# Patient Record
Sex: MALE | Race: WHITE | NOT HISPANIC OR LATINO | Employment: FULL TIME | URBAN - METROPOLITAN AREA
[De-identification: names, ages, dates, MRNs, and addresses within clinical notes are randomized per-mention and may not be internally consistent; named-entity substitution may affect disease eponyms.]

---

## 2017-01-03 ENCOUNTER — CARE COORDINATION (OUTPATIENT)
Dept: CARDIOLOGY | Facility: CLINIC | Age: 32
End: 2017-01-03

## 2017-03-19 DIAGNOSIS — J31.0 CHRONIC RHINITIS: ICD-10-CM

## 2017-03-19 NOTE — TELEPHONE ENCOUNTER
ALLEGRA-D 24 HOUR# 180-240 MG      Last Written Prescription Date:  3/30/2010  Last Fill Quantity: 90,   # refills: 0  Last Office Visit with [facility], [facility] or [facility] prescribing provider:10/28/2016  Future Office visit:       Routing refill request to provider for review/approval because:  Drug not active on patient's medication list

## 2017-03-20 RX ORDER — FEXOFENADINE HCL AND PSEUDOEPHEDRINE HCL 180; 240 MG/1; MG/1
1 TABLET, EXTENDED RELEASE ORAL DAILY
Qty: 90 TABLET | Refills: 3 | Status: SHIPPED | OUTPATIENT
Start: 2017-03-20 | End: 2018-04-24

## 2018-03-07 ENCOUNTER — OFFICE VISIT (OUTPATIENT)
Dept: PEDIATRICS | Facility: CLINIC | Age: 33
End: 2018-03-07
Payer: COMMERCIAL

## 2018-03-07 VITALS
WEIGHT: 210.6 LBS | OXYGEN SATURATION: 96 % | TEMPERATURE: 98.3 F | HEART RATE: 119 BPM | SYSTOLIC BLOOD PRESSURE: 132 MMHG | HEIGHT: 66 IN | DIASTOLIC BLOOD PRESSURE: 76 MMHG | BODY MASS INDEX: 33.85 KG/M2

## 2018-03-07 DIAGNOSIS — L72.3 SEBACEOUS CYST: Primary | ICD-10-CM

## 2018-03-07 PROCEDURE — 99213 OFFICE O/P EST LOW 20 MIN: CPT | Performed by: INTERNAL MEDICINE

## 2018-03-07 RX ORDER — CLINDAMYCIN HCL 300 MG
300 CAPSULE ORAL 3 TIMES DAILY
Qty: 21 CAPSULE | Refills: 0 | Status: SHIPPED | OUTPATIENT
Start: 2018-03-07 | End: 2018-03-14

## 2018-03-07 NOTE — MR AVS SNAPSHOT
After Visit Summary   3/7/2018    Judah Candelaria    MRN: [id number]           Patient Information     Date Of Birth          1985        Visit Information        Provider Department      3/7/2018 3:20 PM Rajeev Barnes MD [facility]        Today's Diagnoses     Sebaceous cyst    -  1      Care Instructions    Clindamycin 300 mg    Take 3 times per day for 1 week   Antibiotic to treat infection in the cyst    Call to set up a surgery visit to discuss having the cyst removed           Follow-ups after your visit        Additional Services     GENERAL SURG ADULT REFERRAL       Your provider has referred you to:   [facility] [place] [phone]   [email or web address]    Please be aware that coverage of these services is subject to the terms and limitations of your health insurance plan.  Call member services at your health plan with any benefit or coverage questions.      Please bring the following with you to your appointment:    (1) Any X-Rays, CTs or MRIs which have been performed.  Contact the facility where they were done to arrange for  prior to your scheduled appointment.   (2) List of current medications   (3) This referral request   (4) Any documents/labs given to you for this referral                  Who to contact     If you have questions or need follow up information about today's clinic visit or your schedule please contact [facility] directly at [phone].  Normal or non-critical lab and imaging results will be communicated to you by MyChart, letter or phone within 4 business days after the clinic has received the results. If you do not hear from us within 7 days, please contact the clinic through MyChart or phone. If you have a critical or abnormal lab result, we will notify you by phone as soon as possible.  Submit refill requests through [employer] or call your pharmacy and they will  "forward the refill request to us. Please allow 3 business days for your refill to be completed.          Additional Information About Your Visit        [email or web address] Information     [email or web address] lets you send messages to your doctor, view your test results, renew your prescriptions, schedule appointments and more. To sign up, go to [email or web address] . Click on \"Log in\" on the left side of the screen, which will take you to the Welcome page. Then click on \"Sign up Now\" on the right side of the page.     You will be asked to enter the access code listed below, as well as some personal information. Please follow the directions to create your username and password.     Your access code is: [email or web address]  Expires: 2018  3:50 PM     Your access code will  in 90 days. If you need help or a new code, please call your [place] clinic or [phone].        Care EveryWhere ID     This is your Care EveryWhere ID. This could be used by other organizations to access your [place] medical records  [id number]        Your Vitals Were     Pulse Temperature Height Pulse Oximetry BMI (Body Mass Index)       119 98.3  F (36.8  C) 5' 6\" (1.676 m) 96% 33.99 kg/m2        Blood Pressure from Last 3 Encounters:   18 132/76   16 132/78   10/28/16 120/80    Weight from Last 3 Encounters:   18 210 lb 9.6 oz (95.5 kg)   16 196 lb (88.9 kg)   10/28/16 198 lb (89.8 kg)              We Performed the Following     GENERAL SURG ADULT REFERRAL          Today's Medication Changes          These changes are accurate as of 3/7/18  3:50 PM.  If you have any questions, ask your nurse or doctor.               Start taking these medicines.        Dose/Directions    clindamycin 300 MG capsule   Commonly known as:  CLEOCIN   Used for:  Sebaceous cyst   Started by:  Rajeev Barnes MD        Dose:  300 mg   Take 1 capsule (300 mg) by mouth 3 times daily for 7 days   Quantity:  21 capsule   Refills:  0            Where to get your " medicines      These medications were sent to [facility] - [place], [place] - [address], [facility] [address]     Phone:  [phone]     clindamycin 300 MG capsule                Primary Care Provider Office Phone # Fax #    Rajeev Barnes [phone] [address] [place] [address]        Equal Access to Services     [facility]: Hadii aad ku hadasho Soomaali, waaxda luqadaha, qaybta kaalmada adeegyada, waxay idiin hayaan adeeg kharash la'aan ah. So [facility] [phone].    ATENCIÓN: Si habla espjoanne, tiene a medrano disposición servicios gratuitos de asistencia lingüística. [facility] [phone].    We comply with applicable federal civil rights laws and [place] laws. We do not discriminate on the basis of race, color, national origin, age, disability, sex, sexual orientation, or gender identity.            Thank you!     Thank you for choosing [facility]  for your care. Our goal is always to provide you with excellent care. Hearing back from our patients is one way we can continue to improve our services. Please take a few minutes to complete the written survey that you may receive in the mail after your visit with us. Thank you!             Your Updated Medication List - Protect others around you: Learn how to safely use, store and throw away your medicines at www.disposemymeds.org.          This list is accurate as of 3/7/18  3:50 PM.  Always use your most recent med list.                   Brand Name Dispense Instructions for use Diagnosis    clindamycin 300 MG capsule    CLEOCIN    21 capsule    Take 1 capsule (300 mg) by mouth 3 times daily for 7 days    Sebaceous cyst       fexofenadine-pseudoePHEDrine 180-240 MG per 24 hr tablet    ALLEGRA-D 24    90 tablet    Take 1 tablet by mouth daily    Chronic rhinitis

## 2018-03-07 NOTE — PROGRESS NOTES
"  SUBJECTIVE:   Judah Candelaria is a 32 year old male who presents to clinic today for the following health issues:      Lump on back of Neck      Duration: 2 weeks    Description (location/character/radiation): Left side of neck    Intensity:  No pain    History (similar episodes/previous evaluation): cyst on knee once     Has noted discharge from the area over the past day.     Problem list and histories reviewed & adjusted, as indicated.      OBJECTIVE:     /76 (BP Location: Right arm, Patient Position: Sitting, Cuff Size: Adult Large)  Pulse 119  Temp 98.3  F (36.8  C)  Ht 5' 6\" (1.676 m)  Wt 210 lb 9.6 oz (95.5 kg)  SpO2 96%  BMI 33.99 kg/m2  Body mass index is 33.99 kg/(m^2).  GEN: No distress  SKIN: cyst on the left side of the neck. Large in size - palpates at approx 2 cm diameter. Lori fluid oozing at the center. Pink annular ring on the lateral aspect.    ASSESSMENT/PLAN:       ICD-10-CM    1. Sebaceous cyst L72.3 clindamycin (CLEOCIN) 300 MG capsule     GENERAL SURG ADULT REFERRAL     Patient Instructions   Clindamycin 300 mg    Take 3 times per day for 1 week   Antibiotic to treat infection in the cyst    Call to set up a surgery visit to discuss having the cyst removed     Rajeev Barnes MD  [facility]  "

## 2018-03-07 NOTE — PATIENT INSTRUCTIONS
Clindamycin 300 mg    Take 3 times per day for 1 week   Antibiotic to treat infection in the cyst    Call to set up a surgery visit to discuss having the cyst removed

## 2018-03-15 ENCOUNTER — OFFICE VISIT (OUTPATIENT)
Dept: SURGERY | Facility: CLINIC | Age: 33
End: 2018-03-15
Payer: COMMERCIAL

## 2018-03-15 ENCOUNTER — TELEPHONE (OUTPATIENT)
Dept: SURGERY | Facility: CLINIC | Age: 33
End: 2018-03-15

## 2018-03-15 VITALS
WEIGHT: 210 LBS | HEART RATE: 97 BPM | RESPIRATION RATE: 16 BRPM | BODY MASS INDEX: 33.75 KG/M2 | OXYGEN SATURATION: 98 % | SYSTOLIC BLOOD PRESSURE: 112 MMHG | HEIGHT: 66 IN | DIASTOLIC BLOOD PRESSURE: 78 MMHG

## 2018-03-15 DIAGNOSIS — R22.9 LOCALIZED SUPERFICIAL SWELLING, MASS, OR LUMP: Primary | ICD-10-CM

## 2018-03-15 PROCEDURE — 99212 OFFICE O/P EST SF 10 MIN: CPT | Performed by: SURGERY

## 2018-03-15 NOTE — MR AVS SNAPSHOT
"              After Visit Summary   3/15/2018    Judah Candelaria    MRN: [id number]           Patient Information     Date Of Birth          1985        Visit Information        Provider Department      3/15/2018 11:30 AM Lisa Ashby MD [facility] General [facility]      Today's Diagnoses     Localized superficial swelling, mass, or lump    -  1       Follow-ups after your visit        Your next 10 appointments already scheduled     Mar 19, 2018  2:00 PM CDT   Pre-Op physical with Rajeev Barnes MD   [facility] ([facility]    [address] [place] [address]  [address]  [facility] [address]   [phone]            Mar 20, 2018  2:30 PM CDT   [facility] with Lisa Ashby MD   [facility] ([facility])    [facility])   [phone]              Who to contact     If you have questions or need follow up information about today's clinic visit or your schedule please contact [facility] directly at [phone].  Normal or non-critical lab and imaging results will be communicated to you by PhotoRockethart, letter or phone within 4 business days after the clinic has received the results. If you do not hear from us within 7 days, please contact the clinic through [employer]t or phone. If you have a critical or abnormal lab result, we will notify you by phone as soon as possible.  Submit refill requests through [employer] or call your pharmacy and they will forward the refill request to us. Please allow 3 business days for your refill to be completed.          Additional Information About Your Visit        PhotoRocketharSparCode Information     ExpoPromoter lets you send messages to your doctor, view your test results, renew your prescriptions, schedule appointments and more. To sign up, go to [email or web address] . Click on \"Log in\" on " "the left side of the screen, which will take you to the Welcome page. Then click on \"Sign up Now\" on the right side of the page.     You will be asked to enter the access code listed below, as well as some personal information. Please follow the directions to create your username and password.     Your access code is: [email or web address]  Expires: 2018  4:50 PM     Your access code will  in 90 days. If you need help or a new code, please call your [facility] or [phone].        Care EveryWhere ID     This is your Care EveryWhere ID. This could be used by other organizations to access your [facility] medical records  [id number]        Your Vitals Were     Pulse Respirations Height Pulse Oximetry BMI (Body Mass Index)       97 16 5' 6\" (1.676 m) 98% 33.89 kg/m2        Blood Pressure from Last 3 Encounters:   03/15/18 112/78   18 132/76   16 132/78    Weight from Last 3 Encounters:   03/15/18 210 lb (95.3 kg)   18 210 lb 9.6 oz (95.5 kg)   16 196 lb (88.9 kg)              Today, you had the following     No orders found for display       Primary Care Provider Office Phone # Fax #    Rajeev Barnes [phone] [address] [place] [address]        Equal Access to Services     [facility]: Hadii zach ku hadasho Sozairaali, waaxda luqadaha, qaybta kaalmada flor, federico aragon. So [facility] [place] [phone].    ATENCIÓN: Si habla español, tiene a medrano disposición servicios gratuitos de asistencia lingüística. Llame al [phone].    We comply with applicable federal civil rights laws and [place] laws. We do not discriminate on the basis of race, color, national origin, age, disability, sex, sexual orientation, or gender identity.            Thank you!     Thank you for choosing [facility]  for your care. Our goal is always to provide you with excellent care. Hearing back from our patients is one way we can " continue to improve our services. Please take a few minutes to complete the written survey that you may receive in the mail after your visit with us. Thank you!             Your Updated Medication List - Protect others around you: Learn how to safely use, store and throw away your medicines at www.disposemymeds.org.          This list is accurate as of 3/15/18 11:59 PM.  Always use your most recent med list.                   Brand Name Dispense Instructions for use Diagnosis    fexofenadine-pseudoePHEDrine 180-240 MG per 24 hr tablet    ALLEGRA-D 24    90 tablet    Take 1 tablet by mouth daily    Chronic rhinitis

## 2018-03-15 NOTE — LETTER
"    March 15, 2018    RE: Judah Candelaria, \"  1985        Judah Candelaria is seen in consultation for left neck cyst, at the request of Rajeev Barnes MD.     2 cm left, posterior neck dermal cyst.      Plan:  We have discussed the options of observation and excision.  He elects excisional biopsy and excision.  We discussed the indications, alternatives, and risks.  We discussed scarring, numbness, anesthesia, infection, bleeding, and recurrence.     We will schedule surgery with MAC at the patient's convenience.        HPI:  Judah presents with his mother today for a left neck dermal lesion.  Finished 1 week clindamycin.     Duration:  4 weeks   H/o trauma:  No  Drainage:  Yes: pus, improving  Previous infections:  No  Other such masses now or in the past:  Yes:  thigh cyst excised with MAC  Family h/o similar masses:  No  Pain:  No  Size:  decreasing     Past Medical History:   has no past medical history on file.     Family history reviewed and not pertinent.     ROS:  The 10 point review of systems is negative other than noted in the HPI and above.         PE:  /78 (BP Location: Right arm, Cuff Size: Adult Large)  Pulse 97  Resp 16  Ht 5' 6\" (1.676 m)  Wt 210 lb (95.3 kg)  SpO2 98%  BMI 33.89 kg/m2  General appearance: well-developed, well-nourished, no apparent distress  HEENT:  Head normocephalic and atraumatic, pupils equal and round, conjunctivae clear, mucous membranes moist, external ears and nose normal  Lungs: respirations unlabored  Musculoskeletal:  Normal station and gait  Neurologic: alert, speech is clear, moves all extremities with good strength  Psychiatric: affect flat  Skin: There is a 2 cm dermal cyst on/in the left, posterior neck.  There are 2 overlying punctae.  the overlying skin is mildly erythematous.  It is mildly tender.             Lisa Ashby MD    "

## 2018-03-15 NOTE — TELEPHONE ENCOUNTER
Type of surgery: EXCISION LEFT BACK CYST   Location of surgery: Other: [facility]  Date and time of surgery: 3-20-18 AT 2:30 PM   Surgeon: DR. CITLALLI FLOYD   Pre-Op Appt Date: PATIENT TO SCHEDULE   Post-Op Appt Date: PATIENT TO SCHEDULE    Packet sent out: GIVEN TO PATIENT   Pre-cert/Authorization completed:  Yes  Date: 3-15-18        *RSC*  EXCISION LEFT BACK CYST   MAC  PT INST  TO HAVE H&P WITH DR. CHAIDEZ   40 MINS REQ  NON  A

## 2018-03-15 NOTE — PROGRESS NOTES
Assessment:   Judah Candelaria is seen in consultation for left neck cyst, at the request of Rajeev Barnes MD.    2 cm left, posterior neck dermal cyst.     Plan:  We have discussed the options of observation and excision.  He elects excisional biopsy and excision.  We discussed the indications, alternatives, and risks.  We discussed scarring, numbness, anesthesia, infection, bleeding, and recurrence.    We will schedule surgery with MAC at the patient's convenience.      HPI:  Judah presents with his mother today for a left neck dermal lesion.  Finished 1 week clindamycin.    Duration:  4 weeks   H/o trauma:  No  Drainage:  Yes: pus, improving  Previous infections:  No  Other such masses now or in the past:  Yes: 2016 thigh cyst excised with MAC  Family h/o similar masses:  No  Pain:  No  Size:  decreasing    Past Medical History:   has no past medical history on file.    Past Surgical History:  Past Surgical History:   Procedure Laterality Date     C REPR PDA BY LIGATN  11/1988     HC VALVULOPLASTY, AORTIC  10/13/97        Social History:  Social History     Social History     Marital status: Single     Spouse name: N/A     Number of children: N/A     Years of education: N/A     Occupational History     Not on file.     Social History Main Topics     Smoking status: Never Smoker     Smokeless tobacco: Never Used     Alcohol use No      Comment: rare     Drug use: No     Sexual activity: No     Other Topics Concern     Not on file     Social History Narrative        Family History:  Family History   Problem Relation Age of Onset     Hypertension Maternal Grandmother      Hypertension Maternal Grandfather      Heart Disease     CANCER Maternal Grandfather      skin/Prostate     Coronary Artery Disease Maternal Grandfather      Prostate Cancer Maternal Grandfather      Coronary Artery Disease Paternal Grandfather      Family History Negative Mother      Family History Negative Father      Family history  "reviewed and not pertinent.    ROS:  The 10 point review of systems is negative other than noted in the HPI and above.    PE:  /78 (BP Location: Right arm, Cuff Size: Adult Large)  Pulse 97  Resp 16  Ht 5' 6\" (1.676 m)  Wt 210 lb (95.3 kg)  SpO2 98%  BMI 33.89 kg/m2  General appearance: well-developed, well-nourished, no apparent distress  HEENT:  Head normocephalic and atraumatic, pupils equal and round, conjunctivae clear, mucous membranes moist, external ears and nose normal  Lungs: respirations unlabored  Musculoskeletal:  Normal station and gait  Neurologic: alert, speech is clear, moves all extremities with good strength  Psychiatric: affect flat  Skin: There is a 2 cm dermal cyst on/in the left, posterior neck.  There are 2 overlying punctae.  the overlying skin is mildly erythematous.  It is mildly tender.        This note was created using voice recognition software. Undetected word substitutions or other errors may have occurred.     Time spent with the patient with greater that 50% of the time in discussion was 10 minutes.     Lisa Ashby MD    Please route or send letter to:  Primary Care Provider (PCP) and Referring Provider      "

## 2018-03-16 ENCOUNTER — TELEPHONE (OUTPATIENT)
Dept: PEDIATRICS | Facility: CLINIC | Age: 33
End: 2018-03-16

## 2018-03-16 DIAGNOSIS — Q25.1 CONGENITAL ATRESIA AND STENOSIS OF AORTA: Primary | ICD-10-CM

## 2018-03-16 DIAGNOSIS — Q25.29 CONGENITAL ATRESIA AND STENOSIS OF AORTA: Primary | ICD-10-CM

## 2018-03-16 DIAGNOSIS — Q23.81 BICUSPID AORTIC VALVE: ICD-10-CM

## 2018-03-16 NOTE — TELEPHONE ENCOUNTER
Patients mother calling. Patient went to do a physical to get hired for a new job and they heard his heart murmer and want him to obtain a clearance from a cardiologist. 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 States needs asap. Patient would like a call back yet today.  [phone]

## 2018-03-16 NOTE — TELEPHONE ENCOUNTER
I called Judah.  Chart reviewed.  I do not see a recent echocardiogram.   Was referred to cardiology in 2016, but no notes are found in the chart.    LM that I can refer him to cardiology if he would like.   Order pended. OK to sign if he wants.   He could see a cardiologist here at the [facility] or any other [facility] location.

## 2018-03-19 ENCOUNTER — OFFICE VISIT (OUTPATIENT)
Dept: PEDIATRICS | Facility: CLINIC | Age: 33
End: 2018-03-19
Payer: COMMERCIAL

## 2018-03-19 ENCOUNTER — TELEPHONE (OUTPATIENT)
Dept: PEDIATRICS | Facility: CLINIC | Age: 33
End: 2018-03-19

## 2018-03-19 VITALS
RESPIRATION RATE: 20 BRPM | HEIGHT: 67 IN | TEMPERATURE: 98.3 F | BODY MASS INDEX: 32.69 KG/M2 | HEART RATE: 84 BPM | SYSTOLIC BLOOD PRESSURE: 110 MMHG | WEIGHT: 208.3 LBS | DIASTOLIC BLOOD PRESSURE: 78 MMHG

## 2018-03-19 DIAGNOSIS — Q23.81 BICUSPID AORTIC VALVE: ICD-10-CM

## 2018-03-19 DIAGNOSIS — L72.3 SEBACEOUS CYST: ICD-10-CM

## 2018-03-19 DIAGNOSIS — Z01.818 PREOP GENERAL PHYSICAL EXAM: Primary | ICD-10-CM

## 2018-03-19 DIAGNOSIS — Q25.29 CONGENITAL ATRESIA AND STENOSIS OF AORTA: ICD-10-CM

## 2018-03-19 DIAGNOSIS — Q25.1 CONGENITAL ATRESIA AND STENOSIS OF AORTA: ICD-10-CM

## 2018-03-19 PROCEDURE — 93000 ELECTROCARDIOGRAM COMPLETE: CPT | Performed by: INTERNAL MEDICINE

## 2018-03-19 PROCEDURE — 99214 OFFICE O/P EST MOD 30 MIN: CPT | Performed by: INTERNAL MEDICINE

## 2018-03-19 NOTE — TELEPHONE ENCOUNTER
Spoke with mom this morning. As Cardiology next available appt. Was in April.     Pt. Has OV scheduled for today. Advised to discuss external cardiology referral and possible echo at today's visit. Mom expressed understanding.     Faviola HOWARD RN, BSN, PHN  [facility] RN

## 2018-03-19 NOTE — PATIENT INSTRUCTIONS
Call your surgeon if there is any change in your health. This includes signs of a cold or flu (such as a sore throat, runny nose, cough, rash or fever).    Do not smoke, drink alcohol or take over the counter medicine (unless your surgeon or primary care doctor tells you to) for the 24 hours before and after surgery.    Eating and drinking prior to surgery: follow the instructions from your surgeon

## 2018-03-19 NOTE — MR AVS SNAPSHOT
After Visit Summary   3/19/2018    Judah Candelaria    MRN: [id number]           Patient Information     Date Of Birth          1985        Visit Information        Provider Department      3/19/2018 2:00 PM Rajeev Barnes MD [place] [facility]        Today's Diagnoses     Preop general physical exam    -  1    Sebaceous cyst        Bicuspid aortic valve        Congenital atresia and stenosis of aorta          Care Instructions        Call your surgeon if there is any change in your health. This includes signs of a cold or flu (such as a sore throat, runny nose, cough, rash or fever).    Do not smoke, drink alcohol or take over the counter medicine (unless your surgeon or primary care doctor tells you to) for the 24 hours before and after surgery.    Eating and drinking prior to surgery: follow the instructions from your surgeon            Follow-ups after your visit        Your next 10 appointments already scheduled     Mar 20, 2018  2:30 PM T   [facility]   [facility] ([facility])    [facility] ([facility])   [phone]              Future tests that were ordered for you today     Open Future Orders        Priority Expected Expires Ordered    Echo congenital adult Routine  3/19/2019 3/19/2018            Who to contact     If you have questions or need follow up information about today's clinic visit or your schedule please contact [facility] [place] directly at [phone].  Normal or non-critical lab and imaging results will be communicated to you by MyChart, letter or phone within 4 business days after the clinic has received the results. If you do not hear from us within 7 days, please contact the clinic through MyChart or phone. If you have a critical or abnormal lab result, we will notify you by phone as soon as possible.  Submit refill requests through  "MyChart or call your pharmacy and they will forward the refill request to us. Please allow 3 business days for your refill to be completed.          Additional Information About Your Visit        MyChart Information     [email or web address] lets you send messages to your doctor, view your test results, renew your prescriptions, schedule appointments and more. To sign up, go to [email or web address] . Click on \"Log in\" on the left side of the screen, which will take you to the Welcome page. Then click on \"Sign up Now\" on the right side of the page.     You will be asked to enter the access code listed below, as well as some personal information. Please follow the directions to create your username and password.     Your access code is: [email or web address]  Expires: 2018  4:50 PM     Your access code will  in 90 days. If you need help or a new code, please call your [facility] clinic or [phone].        Care EveryWhere ID     This is your Care EveryWhere ID. This could be used by other organizations to access your [facility] medical records  [id number]        Your Vitals Were     Pulse Temperature Respirations Height BMI (Body Mass Index)       84 98.3  F (36.8  C) (Oral) 20 5' 6.5\" (1.689 m) 33.12 kg/m2        Blood Pressure from Last 3 Encounters:   18 110/78   03/15/18 112/78   18 132/76    Weight from Last 3 Encounters:   18 208 lb 4.8 oz (94.5 kg)   03/15/18 210 lb (95.3 kg)   18 210 lb 9.6 oz (95.5 kg)              We Performed the Following     EKG 12-lead complete w/read - Clinics        Primary Care Provider Office Phone # Fax #    Rajeev Barnes [phone] [address] [place] [id number]        Equal Access to Services     [facility] : [place], waneo luqcontreras, federico au . So [facility] [phone].    ATENCIÓN: Si habla español, tiene a medrano disposición servicios gratuitos de " asistencia lingüística. Bernie al [phone].    We comply with applicable federal civil rights laws and [place] laws. We do not discriminate on the basis of race, color, national origin, age, disability, sex, sexual orientation, or gender identity.            Thank you!     Thank you for choosing [facility] [place]  for your care. Our goal is always to provide you with excellent care. Hearing back from our patients is one way we can continue to improve our services. Please take a few minutes to complete the written survey that you may receive in the mail after your visit with us. Thank you!             Your Updated Medication List - Protect others around you: Learn how to safely use, store and throw away your medicines at www.disposemymeds.org.          This list is accurate as of 3/19/18  2:44 PM.  Always use your most recent med list.                   Brand Name Dispense Instructions for use Diagnosis    fexofenadine-pseudoePHEDrine 180-240 MG per 24 hr tablet    ALLEGRA-D 24    90 tablet    Take 1 tablet by mouth daily    Chronic rhinitis

## 2018-03-19 NOTE — PROGRESS NOTES
[facility] [place]  [address] [place] [address]  [address]  [place] [id number] [phone]    PRE-OP EVALUATION:  Today's date: 3/19/2018    Fax number for surgical facility: [phone]  Primary Physician: Rajeev Barnes  Type of Anesthesia Anticipated: General    Patient has a Health Care Directive or Living Will:  NO    Preop Questions 3/19/2018   Who is doing your surgery? Dr. Ashby   What are you having done? cyst removed   Date of Surgery/Procedure: 03\20\18   Facility or Hospital where procedure/surgery will be performed: [facility]   1.  Do you have a history of Heart attack, stroke, stent, coronary bypass surgery, or other heart surgery? YES  - congenital heart disease   2.  Do you ever have any pain or discomfort in your chest? No   3.  Do you have a history of  Heart Failure? No   4.   Are you troubled by shortness of breath when:  walking on a level surface, or up a slight hill, or at night? No   5.  Do you currently have a cold, bronchitis or other respiratory infection? No   6.  Do you have a cough, shortness of breath, or wheezing? No   7.  Do you sometimes get pains in the calves of your legs when you walk? No   8. Do you or anyone in your family have previous history of blood clots? No   9.  Do you or does anyone in your family have a serious bleeding problem such as prolonged bleeding following surgeries or cuts? No   10. Have you ever had problems with anemia or been told to take iron pills? No   11. Have you had any abnormal blood loss such as black, tarry or bloody stools? No   12. Have you ever had a blood transfusion? No   13. Have you or any of your relatives ever had problems with anesthesia? No   14. Do you have sleep apnea, excessive snoring or daytime drowsiness? No   15. Do you have any prosthetic heart valves? No   16. Do you have prosthetic joints? No     HPI:     HPI related to upcoming procedure: Recurrent cyst on the upper back. Plan for surgical removal.  "    Bicuspid aortic valve and hx of congenital atresia of the aorta. Last echocardiogram was in 2006. No cardiac sx such as CP, palpitations, PND, orthopnea, FIELDS or peripheral edema.     MEDICAL HISTORY:     Patient Active Problem List    Diagnosis Date Noted     Sebaceous cyst 04/19/2013     Bicuspid aortic valve 04/19/2013     Congenital atresia and stenosis of aorta 09/07/2007     * * * SBE PROPHYLAXIS * * * 09/07/2007     Chronic maxillary sinusitis 10/18/2002     Chronic rhinitis 10/18/2002      No past medical history on file.  Past Surgical History:   Procedure Laterality Date     C REPR PDA BY LIGATN  11/1988     HC VALVULOPLASTY, AORTIC  10/13/97     Current Outpatient Prescriptions   Medication Sig Dispense Refill     fexofenadine-pseudoePHEDrine (ALLEGRA-D 24) 180-240 MG per 24 hr tablet Take 1 tablet by mouth daily 90 tablet 3     OTC products: no recent use of OTC ASA, NSAIDS or Steroids    Allergies   Allergen Reactions     Augmentin Nausea     Clindamycin Nausea and Vomiting      Latex Allergy: NO    Social History   Substance Use Topics     Smoking status: Never Smoker     Smokeless tobacco: Never Used     Alcohol use No      Comment: rare     History   Drug Use No       REVIEW OF SYSTEMS:   Constitutional, HEENT, cardiovascular, pulmonary, gi and gu systems are negative, except as otherwise noted.    EXAM:   /78  Pulse 84  Temp 98.3  F (36.8  C) (Oral)  Resp 20  Ht 5' 6.5\" (1.689 m)  Wt 208 lb 4.8 oz (94.5 kg)  BMI 33.12 kg/m2    GENERAL APPEARANCE: healthy, alert and no distress     EYES: EOMI,  PERRL     HENT: ear canals and TM's normal and nose and mouth without ulcers or lesions     NECK: no adenopathy, no asymmetry, masses, or scars and thyroid normal to palpation     RESP: lungs clear to auscultation - no rales, rhonchi or wheezes     CV: regular rates and rhythm, normal S1 S2,. 2-3/6 systolic murmur RUSB and LLSB. No click or rub     ABDOMEN:  soft, nontender, no HSM or masses and " bowel sounds normal     MS: extremities normal- no gross deformities noted, no evidence of inflammation in joints, FROM in all extremities.     SKIN: no suspicious lesions or rashes     NEURO: Normal strength and tone, sensory exam grossly normal, mentation intact and speech normal     PSYCH: mentation appears normal. and affect normal/bright     LYMPHATICS: No cervical adenopathy    DIAGNOSTICS:   EKG: Normal Sinus Rhythm, normal axis, normal intervals, no acute ST/T changes c/w ischemia, no LVH by voltage criteria, unchanged from previous tracings    Recent Labs   Lab Test  11/10/10   0922   NA  141   POTASSIUM  4.2   CR  0.94        IMPRESSION:   Reason for surgery/procedure: cyst    The proposed surgical procedure is considered LOW risk.    REVISED CARDIAC RISK INDEX  The patient has the following serious cardiovascular risks for perioperative complications such as (MI, PE, VFib and 3  AV Block):      ICD-10-CM    1. Preop general physical exam Z01.818 EKG 12-lead complete w/read - Clinics   2. Sebaceous cyst L72.3 EKG 12-lead complete w/read - Clinics   3. Bicuspid aortic valve Q23.1 Echo congenital adult     EKG 12-lead complete w/read - Clinics   4. Congenital atresia and stenosis of aorta Q25.29 Echo congenital adult    Q25.1 EKG 12-lead complete w/read - Clinics   Functional capacity by history is normal. Low risk surgery.   Echocardiogram ordered but will not be able to be completed prior to surgery.     RECOMMENDATIONS:     APPROVAL GIVEN to proceed with proposed procedure, without further diagnostic evaluation       Signed Electronically by: Rajeev Barnes MD

## 2018-03-20 ENCOUNTER — APPOINTMENT (OUTPATIENT)
Dept: SURGERY | Facility: PHYSICIAN GROUP | Age: 33
End: 2018-03-20
Payer: COMMERCIAL

## 2018-03-20 ENCOUNTER — TRANSFERRED RECORDS (OUTPATIENT)
Dept: SURGERY | Facility: CLINIC | Age: 33
End: 2018-03-20

## 2018-03-20 ENCOUNTER — HOSPITAL PATHOLOGY (OUTPATIENT)
Dept: OTHER | Facility: CLINIC | Age: 33
End: 2018-03-20

## 2018-03-20 PROCEDURE — 11402 EXC TR-EXT B9+MARG 1.1-2 CM: CPT | Performed by: SURGERY

## 2018-03-21 ENCOUNTER — HOSPITAL ENCOUNTER (OUTPATIENT)
Dept: CARDIOLOGY | Facility: CLINIC | Age: 33
Discharge: HOME | End: 2018-03-21
Attending: INTERNAL MEDICINE | Admitting: INTERNAL MEDICINE
Payer: COMMERCIAL

## 2018-03-21 DIAGNOSIS — Q23.81 BICUSPID AORTIC VALVE: ICD-10-CM

## 2018-03-21 DIAGNOSIS — Q25.29 CONGENITAL ATRESIA AND STENOSIS OF AORTA: ICD-10-CM

## 2018-03-21 DIAGNOSIS — Q25.1 CONGENITAL ATRESIA AND STENOSIS OF AORTA: ICD-10-CM

## 2018-03-21 PROCEDURE — 25500064 ZZH RX 255 OP 636: Performed by: INTERNAL MEDICINE

## 2018-03-21 PROCEDURE — 93306 TTE W/DOPPLER COMPLETE: CPT | Mod: 26 | Performed by: INTERNAL MEDICINE

## 2018-03-21 PROCEDURE — 40000264 ECHO COMPLETE WITH OPTISON

## 2018-03-21 RX ADMIN — HUMAN ALBUMIN MICROSPHERES AND PERFLUTREN 3 ML: 10; .22 INJECTION, SOLUTION INTRAVENOUS at 11:30

## 2018-03-22 LAB — COPATH REPORT: NORMAL

## 2018-04-24 DIAGNOSIS — J31.0 CHRONIC RHINITIS, UNSPECIFIED TYPE: ICD-10-CM

## 2018-04-24 RX ORDER — FEXOFENADINE HCL AND PSEUDOEPHEDRINE HCL 180; 240 MG/1; MG/1
TABLET, EXTENDED RELEASE ORAL
Qty: 90 TABLET | OUTPATIENT
Start: 2018-04-24

## 2018-04-24 RX ORDER — FEXOFENADINE HCL AND PSEUDOEPHEDRINE HCL 180; 240 MG/1; MG/1
1 TABLET, EXTENDED RELEASE ORAL DAILY
Qty: 90 TABLET | Refills: 1 | Status: SHIPPED | OUTPATIENT
Start: 2018-04-24 | End: 2018-09-23

## 2018-04-24 NOTE — TELEPHONE ENCOUNTER
Requested Prescriptions   Pending Prescriptions Disp Refills     fexofenadine-pseudoePHEDrine (ALLEGRA-D 24) 180-240 MG per 24 hr tablet [Pharmacy Med Name: ALLEGRA-D 24 HOUR TABLET]        Last Written Prescription Date:  3/20/2017  Last Fill Quantity: 90,   # refills: 3  Last Office Visit: 3/19/2018  Future Office visit:       Routing refill request to provider for review/approval because:  Drug not on the [facility] or [facility] refill protocol or controlled substance   90 tablet      Sig: TALE 1 TABLET BY MOUTH DAILY    There is no refill protocol information for this order

## 2018-04-24 NOTE — TELEPHONE ENCOUNTER
Mother calling because patient is at work. States patient is completely out.     Controlled Substance Refill Request for Allegra D  Problem List Complete:  Yes    Last Written Prescription Date:  3/20/17  Last Fill Quantity: 90,   # refills: 3    Last Office Visit with G primary care provider: 3/19/18    Future Office visit:     Controlled substance agreement on file: No.     Processing:  Fax Rx to [facility] pharmacy   checked in past 6 months?  Yes 4/24/18

## 2018-05-04 ENCOUNTER — OFFICE VISIT (OUTPATIENT)
Dept: CARDIOLOGY | Facility: CLINIC | Age: 33
End: 2018-05-04
Attending: INTERNAL MEDICINE
Payer: COMMERCIAL

## 2018-05-04 VITALS
OXYGEN SATURATION: 97 % | HEART RATE: 98 BPM | DIASTOLIC BLOOD PRESSURE: 82 MMHG | WEIGHT: 210.4 LBS | HEIGHT: 66 IN | BODY MASS INDEX: 33.82 KG/M2 | SYSTOLIC BLOOD PRESSURE: 121 MMHG

## 2018-05-04 DIAGNOSIS — Q23.81 BICUSPID AORTIC VALVE: ICD-10-CM

## 2018-05-04 DIAGNOSIS — Q23.81 BICUSPID AORTIC VALVE: Primary | ICD-10-CM

## 2018-05-04 LAB
ALBUMIN SERPL-MCNC: 4.1 G/DL (ref 3.4–5)
ALP SERPL-CCNC: 109 U/L (ref 40–150)
ALT SERPL W P-5'-P-CCNC: 53 U/L (ref 0–70)
ANION GAP SERPL CALCULATED.3IONS-SCNC: 9 MMOL/L (ref 3–14)
AST SERPL W P-5'-P-CCNC: 27 U/L (ref 0–45)
BASOPHILS # BLD AUTO: 0.1 10E9/L (ref 0–0.2)
BASOPHILS NFR BLD AUTO: 1 %
BILIRUB SERPL-MCNC: 0.4 MG/DL (ref 0.2–1.3)
BUN SERPL-MCNC: 13 MG/DL (ref 7–30)
CALCIUM SERPL-MCNC: 9 MG/DL (ref 8.5–10.1)
CHLORIDE SERPL-SCNC: 107 MMOL/L (ref 94–109)
CHOLEST SERPL-MCNC: 131 MG/DL
CO2 SERPL-SCNC: 25 MMOL/L (ref 20–32)
CREAT SERPL-MCNC: 0.88 MG/DL (ref 0.66–1.25)
DIFFERENTIAL METHOD BLD: ABNORMAL
EOSINOPHIL # BLD AUTO: 0.2 10E9/L (ref 0–0.7)
EOSINOPHIL NFR BLD AUTO: 1.4 %
ERYTHROCYTE [DISTWIDTH] IN BLOOD BY AUTOMATED COUNT: 13.7 % (ref 10–15)
GFR SERPL CREATININE-BSD FRML MDRD: >90 ML/MIN/1.7M2
GLUCOSE SERPL-MCNC: 101 MG/DL (ref 70–99)
HCT VFR BLD AUTO: 50.5 % (ref 40–53)
HDLC SERPL-MCNC: 31 MG/DL
HGB BLD-MCNC: 16.3 G/DL (ref 13.3–17.7)
IMM GRANULOCYTES # BLD: 0 10E9/L (ref 0–0.4)
IMM GRANULOCYTES NFR BLD: 0.3 %
LDLC SERPL CALC-MCNC: 77 MG/DL
LYMPHOCYTES # BLD AUTO: 2.2 10E9/L (ref 0.8–5.3)
LYMPHOCYTES NFR BLD AUTO: 19.1 %
MCH RBC QN AUTO: 28 PG (ref 26.5–33)
MCHC RBC AUTO-ENTMCNC: 32.3 G/DL (ref 31.5–36.5)
MCV RBC AUTO: 87 FL (ref 78–100)
MONOCYTES # BLD AUTO: 1 10E9/L (ref 0–1.3)
MONOCYTES NFR BLD AUTO: 8.7 %
NEUTROPHILS # BLD AUTO: 8 10E9/L (ref 1.6–8.3)
NEUTROPHILS NFR BLD AUTO: 69.5 %
NONHDLC SERPL-MCNC: 100 MG/DL
NRBC # BLD AUTO: 0 10*3/UL
NRBC BLD AUTO-RTO: 0 /100
PLATELET # BLD AUTO: 379 10E9/L (ref 150–450)
POTASSIUM SERPL-SCNC: 3.7 MMOL/L (ref 3.4–5.3)
PROT SERPL-MCNC: 7.9 G/DL (ref 6.8–8.8)
RBC # BLD AUTO: 5.83 10E12/L (ref 4.4–5.9)
SODIUM SERPL-SCNC: 142 MMOL/L (ref 133–144)
TRIGL SERPL-MCNC: 116 MG/DL
TSH SERPL DL<=0.005 MIU/L-ACNC: 2.13 MU/L (ref 0.4–4)
WBC # BLD AUTO: 11.5 10E9/L (ref 4–11)

## 2018-05-04 PROCEDURE — G0463 HOSPITAL OUTPT CLINIC VISIT: HCPCS | Mod: 25,ZF

## 2018-05-04 PROCEDURE — 80053 COMPREHEN METABOLIC PANEL: CPT | Performed by: INTERNAL MEDICINE

## 2018-05-04 PROCEDURE — 36415 COLL VENOUS BLD VENIPUNCTURE: CPT | Performed by: INTERNAL MEDICINE

## 2018-05-04 PROCEDURE — 84443 ASSAY THYROID STIM HORMONE: CPT | Performed by: INTERNAL MEDICINE

## 2018-05-04 PROCEDURE — 99204 OFFICE O/P NEW MOD 45 MIN: CPT | Mod: ZP | Performed by: INTERNAL MEDICINE

## 2018-05-04 PROCEDURE — 93010 ELECTROCARDIOGRAM REPORT: CPT | Mod: ZP | Performed by: INTERNAL MEDICINE

## 2018-05-04 PROCEDURE — 80061 LIPID PANEL: CPT | Performed by: INTERNAL MEDICINE

## 2018-05-04 PROCEDURE — 93005 ELECTROCARDIOGRAM TRACING: CPT | Mod: ZF

## 2018-05-04 PROCEDURE — 85025 COMPLETE CBC W/AUTO DIFF WBC: CPT | Performed by: INTERNAL MEDICINE

## 2018-05-04 ASSESSMENT — PAIN SCALES - GENERAL: PAINLEVEL: NO PAIN (0)

## 2018-05-04 NOTE — PROGRESS NOTES
HPI:     Please see dictated note    PAST MEDICAL HISTORY:  No past medical history on file.    CURRENT MEDICATIONS:  Current Outpatient Prescriptions   Medication Sig Dispense Refill     fexofenadine-pseudoePHEDrine (ALLEGRA-D 24) 180-240 MG per 24 hr tablet Take 1 tablet by mouth daily 90 tablet 1       PAST SURGICAL HISTORY:  Past Surgical History:   Procedure Laterality Date     C REPR PDA BY LIGATN  11/1988     HC VALVULOPLASTY, AORTIC  10/13/97       ALLERGIES     Allergies   Allergen Reactions     Augmentin Nausea     Clindamycin Nausea and Vomiting       FAMILY HISTORY:  Family History   Problem Relation Age of Onset     Hypertension Maternal Grandmother      Hypertension Maternal Grandfather      Heart Disease     CANCER Maternal Grandfather      skin/Prostate     Coronary Artery Disease Maternal Grandfather      Prostate Cancer Maternal Grandfather      Coronary Artery Disease Paternal Grandfather      Family History Negative Mother      Family History Negative Father        SOCIAL HISTORY:  Social History     Social History     Marital status: Single     Spouse name: N/A     Number of children: N/A     Years of education: N/A     Social History Main Topics     Smoking status: Never Smoker     Smokeless tobacco: Never Used     Alcohol use No      Comment: rare     Drug use: No     Sexual activity: No     Other Topics Concern     None     Social History Narrative       ROS:   Constitutional: No fever, chills, or sweats. No weight gain/loss   ENT: No visual disturbance, ear ache, epistaxis, sore throat  Allergies/Immunologic: Negative.   Respiratory: No cough, hemoptysia  Cardiovascular: As per HPI  GI: No nausea, vomiting, hematemesis, melena, or hematochezia  : No urinary frequency, dysuria, or hematuria  Integument: Negative  Psychiatric: Negative  Neuro: Negative  Endocrinology: Negative   Musculoskeletal: Negative    EXAM:  /82 (BP Location: Left arm, Patient Position: Chair, Cuff Size: Adult  "Large)  Pulse 98  Ht 1.676 m (5' 6\")  Wt 95.4 kg (210 lb 6.4 oz)  SpO2 97%  BMI 33.96 kg/m2  In general, the patient is a pleasant male in no apparent distress.    HEENT: NC/AT.  PERRLA.  EOMI.  Sclerae white, not injected.  Nares clear.  Pharynx without erythema or exudate.  Dentition intact.    Neck:. Carotids +4/4 bilaterally without bruits.  No jugular venous distension.   Heart: RRR. Normal S1, S2 splits physiologically. He has a 2/6 systolic murmur. No diastolic murmur.  Lungs: CTA.  No ronchi, wheezes, rales.   Abdomen: Soft, nontender, nondistended  Extremities: No clubbing, cyanosis, or edema.    Neurologic: Alert and oriented to person/place/time, normal speech, gait and affect  Skin: No petechiae, purpura or rash.    Labs:  LIPID RESULTS:  Lab Results   Component Value Date    CHOL 141 10/28/2016    HDL 34 (L) 10/28/2016    LDL 90 10/28/2016    TRIG 83 10/28/2016    CHOLHDLRATIO 4.5 08/03/2015    NHDL 107 10/28/2016       LIVER ENZYME RESULTS:  No results found for: AST, ALT    CBC RESULTS:  Lab Results   Component Value Date    WBC 11.5 (H) 05/04/2018    RBC 5.83 05/04/2018    HGB 16.3 05/04/2018    HCT 50.5 05/04/2018    MCV 87 05/04/2018    MCH 28.0 05/04/2018    MCHC 32.3 05/04/2018    RDW 13.7 05/04/2018     05/04/2018       BMP RESULTS:  Lab Results   Component Value Date     11/10/2010    POTASSIUM 4.2 11/10/2010    CHLORIDE 103 11/10/2010    CO2 26 11/10/2010    ANIONGAP 13 11/10/2010     (H) 10/28/2016    BUN 13 11/10/2010    CR 0.94 11/10/2010    GFRESTIMATED >90 11/10/2010    GFRESTBLACK >90 11/10/2010    CHRISTIN 9.3 11/10/2010        MATHEW Paez MD       CC  Patient Care Team:  Michael Chaidez MD as PCP - General  MICHAEL CHAIDEZ  "

## 2018-05-04 NOTE — PROGRESS NOTES
Service Date: 2018      HISTORY OF PRESENT ILLNESS:  Mr. Candelaria is a very pleasant 32-year-old gentleman who is visiting for the first time with his mom.  He has a past medical history significant for congenital heart disease.  Apparently this was diagnosed in infancy when they heard a murmur.  He was followed at [facility] in [place], [place], under the care of Dr. Allen and had his first surgery at 1-1/4 years of age.  At this time, we do not have this operative report, but mother reports it was a VSD repair and she thinks mitral valve repair.  He had a second surgery in 4th grade, also by Dr. Allen, but we do not have the details of that and mom does not remember what the surgery was for.  They moved to [place] in  and was followed by Dr. Burr until , when he was lost to followup.  At the time of his last visit, apparently he was doing well without any concerning symptoms.  In the past 12 years, he has also been doing well.  He has graduated.  He works now in [employer] for the past month and enjoys his job.  He is single, has no children.  He lives with his mom.  He has 1 younger brother with cerebral palsy who is 19 and an older brother who is 32.  There is a family history of early coronary artery disease in maternal grandfather who had his first MI in his 50s and paternal grandfather who had an MI and  in his 50s.  There is no family history of congenital heart disease.  It sounds like actually first-degree relatives have not been screened for bicuspid aortic valve, which Mr. Candelaria also has with just mild stenosis on his most recent echo.  The images were actually quite poor.  It was done on 2018.  His mean gradient across his valve was 13.  No significant regurgitation.  His mitral valve looked fine without any evidence of stenosis.  His LVEF was normal without regional wall motion abnormalities.      Mr. Candelaria is not particularly active.  He  only gets around at work.  His BMI is 34, and his weight has gone up about 15 pounds according to the records in the past 2 years.  He knows he needs to work on diet and exercise.  His cholesterol today actually looked good.  His LDL was 77 with an HDL of 31.  He is not on any treatment.  Creatinine is normal.  Liver function tests are normal.  Borderline fasting glucose at 101.  He does hope to have a family in the future.  His ascending aorta measured 2.9 on the echo.        IMPRESSION, REPORT, PLAN:   1.  Congenital heart disease, status post reported mitral valve repair and VSD closure at 1-1/2 years of age at [facility] in [place], [place], by Dr. Allen (we do not have this operative report).   2.  Second surgery apparently 10 years later by Dr. Allen in [place], [place] (we do not know the details of this or have the operative report).   3.  Bicuspid aortic valve with mild stenosis with a mean gradient of 13, need for family screening.   4.  Obesity.      DISCUSSION:  It was a pleasure being involved in the care of Mr. Candelaria.  Today I discussed his history, his previous surgeries and his testing.  Fortunately, at this time his heart is really doing well.  There are no concerning findings on his echo, and we discussed we will continue to watch this valve as we are doing.  If it stays stable next year, we plan to maybe stretch it out to every other year.  We discussed diet and exercise and working on that and options for help with that including apps like My Fitness Pal and reading what he eats down.  We will work on getting his operative reports from [facility] in [facility] and his last records from when he saw Dr. Burr, his initial consultation in 1998 and his last visit in 2006.  Mr. Candelaria understands and is in agreement with the plan as outlined.  All questions were answered.  It was a pleasure to see him.  Please do not hesitate to contact me with any  questions or concerns.         JONES DIANA MD             D: 2018   T: 2018   MT: KAYLEIGH      Name:     MARLEN LUO   MRN:      [id number]        Account:      [id number]   :      1985           Service Date: 2018      Document: [id number]

## 2018-05-04 NOTE — NURSING NOTE
Chief Complaint   Patient presents with     Follow Up For      heart problem -- 32 yr old male with PMH significant for newly diagnosed BAV presenting for evaluation     Vitals were taken and medications were reconciled. EKG was performed.    Anny Harper MA    2:07 PM

## 2018-05-04 NOTE — PATIENT INSTRUCTIONS
"You were seen today in the Adult Congenital and Cardiovascular Genetics Clinic at the [facility].    Cardiology Providers you saw during your visit:  Dr. Roxanna Paez     Diagnosis: Bicuspid Aortic Valve    Results:  All your lab work was normal.    Recommendations:    1.  Continue to eat a heart healthy, low salt diet.  2.  Continue to get 20-30 minutes of aerobic activity, 4-5 days per week.  Examples of aerobic activity include walking, running, swimming, cycling, etc.  3.  Continue to observe good oral hygiene, with regular dental visits.  4.  No changes today.    Results for MARLEN LUO (MRN [id number]) as of 5/4/2018 14:51   Ref. Range 5/4/2018 13:55   Cholesterol Latest Ref Range: <200 mg/dL 131   HDL Cholesterol Latest Ref Range: >39 mg/dL 31 (L)   LDL Cholesterol Calculated Latest Ref Range: <100 mg/dL 77   Non HDL Cholesterol Latest Ref Range: <130 mg/dL 100   Triglycerides Latest Ref Range: <150 mg/dL 116   TSH Latest Ref Range: 0.40 - 4.00 mU/L 2.13       Vitals:    05/04/18 1403   BP: 121/82   BP Location: Left arm   Patient Position: Chair   Cuff Size: Adult Large   Pulse: 98   SpO2: 97%   Weight: 95.4 kg (210 lb 6.4 oz)   Height: 1.676 m (5' 6\")     Exercise restrictions:   Yes__X__  No____         If yes, list restrictions:  Must be allowed to rest if fatigued or SOB      Work restrictions:  Yes____  No__X__         If yes, list restrictions:    FASTING CHOLESTEROL was checked in the last 5 years YES_X__  NO___ 2016  Continue to eat a heart healthy, low salt diet.         ____ Fasting lipid panel order today         ____ No changes in medications          ____ I recommend the following changes in your cholesterol medications.:          ____ Please follow up for cholesterol screening at your primary care physician      Follow-up:  Follow up with Dr. Paez in 1 year with an echo.     For after hours urgent needs, call [phone] and ask to speak to the Adult Congenital Physician " on call.  Mention Job Code 0401.    For emergencies call 911.    For any scheduling needs and to contact your nurse in the Adult Congenital and Cardiovascular Genetics Clinic, please call Nnamdi Mccarthy, Procedure , at [phone].    Thank you for your visit today!  If you have questions or concerns about today's visit, please call me.    Olya Alex RN, BSN  Cardiology Care Coordinator  [facility]  [email or web address]  Ph.[phone]    [facility]  [facility]   Clinics and Surgery Center  Mail Code [id number]  [address] [place], [place] [address]

## 2018-05-04 NOTE — LETTER
5/4/2018      RE: Judah Candelaria  [address]  [facility] [address]       Dear Colleague,    Thank you for the opportunity to participate in the care of your patient, Judah Candelaria, at the [facility] HEART [facility]. Please see a copy of my visit note below.    HPI:     Please see dictated note    PAST MEDICAL HISTORY:  No past medical history on file.    CURRENT MEDICATIONS:  Current Outpatient Prescriptions   Medication Sig Dispense Refill     fexofenadine-pseudoePHEDrine (ALLEGRA-D 24) 180-240 MG per 24 hr tablet Take 1 tablet by mouth daily 90 tablet 1     PAST SURGICAL HISTORY:  Past Surgical History:   Procedure Laterality Date     C REPR PDA BY LIGATN  11/1988     HC VALVULOPLASTY, AORTIC  10/13/97     ALLERGIES  Allergies   Allergen Reactions     Augmentin Nausea     Clindamycin Nausea and Vomiting     FAMILY HISTORY:  Family History   Problem Relation Age of Onset     Hypertension Maternal Grandmother      Hypertension Maternal Grandfather      Heart Disease     CANCER Maternal Grandfather      skin/Prostate     Coronary Artery Disease Maternal Grandfather      Prostate Cancer Maternal Grandfather      Coronary Artery Disease Paternal Grandfather      Family History Negative Mother      Family History Negative Father      SOCIAL HISTORY:  Social History     Social History     Marital status: Single     Spouse name: N/A     Number of children: N/A     Years of education: N/A     Social History Main Topics     Smoking status: Never Smoker     Smokeless tobacco: Never Used     Alcohol use No      Comment: rare     Drug use: No     Sexual activity: No     Other Topics Concern     None     Social History Narrative     ROS:   Constitutional: No fever, chills, or sweats. No weight gain/loss   ENT: No visual disturbance, ear ache, epistaxis, sore throat  Allergies/Immunologic: Negative.   Respiratory: No cough, hemoptysia  Cardiovascular: As per  "HPI  GI: No nausea, vomiting, hematemesis, melena, or hematochezia  : No urinary frequency, dysuria, or hematuria  Integument: Negative  Psychiatric: Negative  Neuro: Negative  Endocrinology: Negative   Musculoskeletal: Negative    EXAM:  /82 (BP Location: Left arm, Patient Position: Chair, Cuff Size: Adult Large)  Pulse 98  Ht 1.676 m (5' 6\")  Wt 95.4 kg (210 lb 6.4 oz)  SpO2 97%  BMI 33.96 kg/m2  In general, the patient is a pleasant male in no apparent distress.    HEENT: NC/AT.  PERRLA.  EOMI.  Sclerae white, not injected.  Nares clear.  Pharynx without erythema or exudate.  Dentition intact.    Neck:. Carotids +4/4 bilaterally without bruits.  No jugular venous distension.   Heart: RRR. Normal S1, S2 splits physiologically. He has a 2/6 systolic murmur. No diastolic murmur.  Lungs: CTA.  No ronchi, wheezes, rales.   Abdomen: Soft, nontender, nondistended  Extremities: No clubbing, cyanosis, or edema.    Neurologic: Alert and oriented to person/place/time, normal speech, gait and affect  Skin: No petechiae, purpura or rash.    Labs:  LIPID RESULTS:  Lab Results   Component Value Date    CHOL 141 10/28/2016    HDL 34 (L) 10/28/2016    LDL 90 10/28/2016    TRIG 83 10/28/2016    CHOLHDLRATIO 4.5 08/03/2015    NHDL 107 10/28/2016       LIVER ENZYME RESULTS:  No results found for: AST, ALT    CBC RESULTS:  Lab Results   Component Value Date    WBC 11.5 (H) 05/04/2018    RBC 5.83 05/04/2018    HGB 16.3 05/04/2018    HCT 50.5 05/04/2018    MCV 87 05/04/2018    MCH 28.0 05/04/2018    MCHC 32.3 05/04/2018    RDW 13.7 05/04/2018     05/04/2018       BMP RESULTS:  Lab Results   Component Value Date     11/10/2010    POTASSIUM 4.2 11/10/2010    CHLORIDE 103 11/10/2010    CO2 26 11/10/2010    ANIONGAP 13 11/10/2010     (H) 10/28/2016    BUN 13 11/10/2010    CR 0.94 11/10/2010    GFRESTIMATED >90 11/10/2010    GFRESTBLACK >90 11/10/2010    CHRISTIN 9.3 11/10/2010        MATHEW Paez MD"     CC  Patient Care Team:  Rajeev Barnes MD as PCP - GeneralE    Service Date: 2018      HISTORY OF PRESENT ILLNESS:  Mr. Candelaria is a very pleasant 32-year-old gentleman who is visiting for the first time with his mom.  He has a past medical history significant for congenital heart disease.  Apparently this was diagnosed in infancy when they heard a murmur.  He was followed at [facility] in [place], [place], under the care of Dr. Allen and had his first surgery at 1-1/4 years of age.  At this time, we do not have this operative report, but mother reports it was a VSD repair and she thinks mitral valve repair.  He had a second surgery in 4th grade, also by Dr. Allen, but we do not have the details of that and mom does not remember what the surgery was for.  They moved to [place] in  and was followed by Dr. Burr until , when he was lost to followup.  At the time of his last visit, apparently he was doing well without any concerning symptoms.  In the past 12 years, he has also been doing well.  He has graduated.  He works now in [employer] for the past month and enjoys his job.  He is single, has no children.  He lives with his mom.  He has 1 younger brother with cerebral palsy who is 19 and an older brother who is 32.  There is a family history of early coronary artery disease in maternal grandfather who had his first MI in his 50s and paternal grandfather who had an MI and  in his 50s.  There is no family history of congenital heart disease.  It sounds like actually first-degree relatives have not been screened for bicuspid aortic valve, which Mr. Candelaria also has with just mild stenosis on his most recent echo.  The images were actually quite poor.  It was done on 2018.  His mean gradient across his valve was 13.  No significant regurgitation.  His mitral valve looked fine without any evidence of stenosis.  His LVEF was normal without regional wall motion  abnormalities.      Mr. Candelaria is not particularly active.  He only gets around at work.  His BMI is 34, and his weight has gone up about 15 pounds according to the records in the past 2 years.  He knows he needs to work on diet and exercise.  His cholesterol today actually looked good.  His LDL was 77 with an HDL of 31.  He is not on any treatment.  Creatinine is normal.  Liver function tests are normal.  Borderline fasting glucose at 101.  He does hope to have a family in the future.  His ascending aorta measured 2.9 on the echo.        IMPRESSION, REPORT, PLAN:   1.  Congenital heart disease, status post reported mitral valve repair and VSD closure at 1-1/2 years of age at [facility] in [place], [place], by Dr. Allen (we do not have this operative report).   2.  Second surgery apparently 10 years later by Dr. Allen in [place], [place] (we do not know the details of this or have the operative report).   3.  Bicuspid aortic valve with mild stenosis with a mean gradient of 13, need for family screening.   4.  Obesity.      DISCUSSION:  It was a pleasure being involved in the care of Mr. Candelaria.  Today I discussed his history, his previous surgeries and his testing.  Fortunately, at this time his heart is really doing well.  There are no concerning findings on his echo, and we discussed we will continue to watch this valve as we are doing.  If it stays stable next year, we plan to maybe stretch it out to every other year.  We discussed diet and exercise and working on that and options for help with that including apps like My Fitness Pal and reading what he eats down.  We will work on getting his operative reports from [facility] in [facility] and his last records from when he saw Dr. Burr, his initial consultation in 1998 and his last visit in 2006.  Mr. Candelaria understands and is in agreement with the plan as outlined.  All questions were answered.  It was a pleasure to  see him.  Please do not hesitate to contact me with any questions or concerns.      D: 2018   T: 2018   MT: KAYLEIGH      Name:     MARLEN LUO   MRN:      [id number]        Account:      [id number]   :      1985           Service Date: 2018      Document: [id number]       Please do not hesitate to contact me if you have any questions/concerns.     Sincerely,     Camilo Paez MD

## 2018-05-04 NOTE — MR AVS SNAPSHOT
"              After Visit Summary   5/4/2018    Marlen Candelaria    MRN: [id number]           Patient Information     Date Of Birth          1985        Visit Information        Provider Department      5/4/2018 2:00 PM Camilo Paez MD [facility] Heart Care        Today's Diagnoses     Bicuspid aortic valve    -  1      Care Instructions    You were seen today in the Adult Congenital and Cardiovascular Genetics Clinic at the [facility].    Cardiology Providers you saw during your visit:  Dr. Mcknight March     Diagnosis: Bicuspid Aortic Valve    Results:  All your lab work was normal.    Recommendations:    1.  Continue to eat a heart healthy, low salt diet.  2.  Continue to get 20-30 minutes of aerobic activity, 4-5 days per week.  Examples of aerobic activity include walking, running, swimming, cycling, etc.  3.  Continue to observe good oral hygiene, with regular dental visits.  4.  No changes today.    Results for MARLEN CANDELARIA (MRN [id number]) as of 5/4/2018 14:51   Ref. Range 5/4/2018 13:55   Cholesterol Latest Ref Range: <200 mg/dL 131   HDL Cholesterol Latest Ref Range: >39 mg/dL 31 (L)   LDL Cholesterol Calculated Latest Ref Range: <100 mg/dL 77   Non HDL Cholesterol Latest Ref Range: <130 mg/dL 100   Triglycerides Latest Ref Range: <150 mg/dL 116   TSH Latest Ref Range: 0.40 - 4.00 mU/L 2.13       Vitals:    05/04/18 1403   BP: 121/82   BP Location: Left arm   Patient Position: Chair   Cuff Size: Adult Large   Pulse: 98   SpO2: 97%   Weight: 95.4 kg (210 lb 6.4 oz)   Height: 1.676 m (5' 6\")     Exercise restrictions:   Yes__X__  No____         If yes, list restrictions:  Must be allowed to rest if fatigued or SOB      Work restrictions:  Yes____  No__X__         If yes, list restrictions:    FASTING CHOLESTEROL was checked in the last 5 years YES_X__  NO___ 2016  Continue to eat a heart healthy, low salt diet.         ____ Fasting lipid panel order today         ____ No " changes in medications          ____ I recommend the following changes in your cholesterol medications.:          ____ Please follow up for cholesterol screening at your primary care physician      Follow-up:  Follow up with Dr. Paez in 1 year with an echo.     For after hours urgent needs, call [phone] and ask to speak to the Adult Congenital Physician on call.  Mention Job Code 0401.    For emergencies call 911.    For any scheduling needs and to contact your nurse in the Adult Congenital and Cardiovascular Genetics Clinic, please call [facility] , at [phone].    Thank you for your visit today!  If you have questions or concerns about today's visit, please call me.    Olya Alex RN, BSN  Cardiology Care Coordinator  [facility] Heart  [email or web address]  Ph.[phone]    [facility]  [facility]  Mail Code 2121CK  [address] [place]  [address]           Follow-ups after your visit        Who to contact     If you have questions or need follow up information about today's clinic visit or your schedule please contact [facility] directly at [phone].  Normal or non-critical lab and imaging results will be communicated to you by MyChart, letter or phone within 4 business days after the clinic has received the results. If you do not hear from us within 7 days, please contact the clinic through MyChart or phone. If you have a critical or abnormal lab result, we will notify you by phone as soon as possible.  Submit refill requests through [employer] or call your pharmacy and they will forward the refill request to us. Please allow 3 business days for your refill to be completed.          Additional Information About Your Visit        [employer] Information     [employer] Gateway lets you send messages to your doctor, view your test results, renew your  "prescriptions, schedule appointments and more. To sign up, go to [email or web address] . Click on \"Log in\" on the left side of the screen, which will take you to the Welcome page. Then click on \"Sign up Now\" on the right side of the page.     You will be asked to enter the access code listed below, as well as some personal information. Please follow the directions to create your username and password.     Your access code is: [email or web address]  Expires: 2018  4:50 PM     Your access code will  in 90 days. If you need help or a new code, please call your [place] clinic or [phone].        Care EveryWhere ID     This is your Care EveryWhere ID. This could be used by other organizations to access your [place] medical records  [id number]        Your Vitals Were     Pulse Height Pulse Oximetry BMI (Body Mass Index)          98 1.676 m (5' 6\") 97% 33.96 kg/m2         Blood Pressure from Last 3 Encounters:   18 121/82   18 110/78   03/15/18 112/78    Weight from Last 3 Encounters:   18 95.4 kg (210 lb 6.4 oz)   18 94.5 kg (208 lb 4.8 oz)   03/15/18 95.3 kg (210 lb)              We Performed the Following     EKG 12-lead, tracing only (Same Day)        Primary Care Provider Office Phone # Fax #    Rajeev Barnes [phone] [address] [place] [address]        Equal Access to Services     [facility]: Hadii zach fernandes hadashgena Sojimmie, waaxda luqadaha, qaybta kaalmatucker ray, federico huston . So [facility] [phone].    ATENCIÓN: Si habla español, tiene a medrano disposición servicios gratuitos de asistencia lingüística. Llame al [phone].    We comply with applicable federal civil rights laws and [place] laws. We do not discriminate on the basis of race, color, national origin, age, disability, sex, sexual orientation, or gender identity.            Thank you!     Thank you for choosing [facility]  for your care. " Our goal is always to provide you with excellent care. Hearing back from our patients is one way we can continue to improve our services. Please take a few minutes to complete the written survey that you may receive in the mail after your visit with us. Thank you!             Your Updated Medication List - Protect others around you: Learn how to safely use, store and throw away your medicines at www.disposemymeds.org.          This list is accurate as of 5/4/18  2:51 PM.  Always use your most recent med list.                   Brand Name Dispense Instructions for use Diagnosis    fexofenadine-pseudoePHEDrine 180-240 MG per 24 hr tablet    ALLEGRA-D 24    90 tablet    Take 1 tablet by mouth daily    Chronic rhinitis, unspecified type

## 2018-05-07 LAB — INTERPRETATION ECG - MUSE: NORMAL

## 2018-05-15 ENCOUNTER — PRE VISIT (OUTPATIENT)
Dept: CARDIOLOGY | Facility: CLINIC | Age: 33
End: 2018-05-15

## 2018-05-15 NOTE — TELEPHONE ENCOUNTER
FUTURE VISIT INFORMATION      FUTURE VISIT INFORMATION:    Date:     Time:     Location:   REFERRAL INFORMATION:    Referring provider:  Dr. Rajeev Barnes    Referring providers clinic:  Internal Medicine    Reason for visit/diagnosis  BAV    RECORDS REQUESTED FROM:       Clinic name Comments Records Status Imaging Status   [facility] Fax sent stating there are no records. No records No Records   [facility]                              RECORDS STATUS

## 2018-07-03 ENCOUNTER — PRE VISIT (OUTPATIENT)
Dept: CARDIOLOGY | Facility: CLINIC | Age: 33
End: 2018-07-03

## 2018-07-03 NOTE — TELEPHONE ENCOUNTER
FUTURE VISIT INFORMATION      FUTURE VISIT INFORMATION:    Date: 07/03/2018    Time: 0943    [place] [facility]  REFERRAL INFORMATION:    Referring provider:  March    Referring providers clinic:  [facility]    Reason for visit/diagnosis  Incoming Records    RECORDS REQUESTED FROM:       Clinic name Comments Records Status Imaging Status   [facility] Dr. Allen N/A N/A   [facility] Dr. Burr Complete Complete                             RECORDS STATUS

## 2018-08-13 ENCOUNTER — OFFICE VISIT (OUTPATIENT)
Dept: PEDIATRICS | Facility: CLINIC | Age: 33
End: 2018-08-13
Payer: COMMERCIAL

## 2018-08-13 VITALS
WEIGHT: 218.3 LBS | TEMPERATURE: 97.7 F | DIASTOLIC BLOOD PRESSURE: 86 MMHG | SYSTOLIC BLOOD PRESSURE: 146 MMHG | BODY MASS INDEX: 35.23 KG/M2 | HEART RATE: 102 BPM

## 2018-08-13 DIAGNOSIS — J01.90 ACUTE RHINOSINUSITIS: Primary | ICD-10-CM

## 2018-08-13 PROCEDURE — 99213 OFFICE O/P EST LOW 20 MIN: CPT | Performed by: INTERNAL MEDICINE

## 2018-08-13 RX ORDER — AZITHROMYCIN 500 MG/1
500 TABLET, FILM COATED ORAL DAILY
Qty: 3 TABLET | Refills: 0 | Status: ON HOLD | OUTPATIENT
Start: 2018-08-13 | End: 2019-10-17

## 2018-08-13 NOTE — PROGRESS NOTES
SUBJECTIVE:   Judah Candelaria is a 33 year old male who presents to clinic today for the following health issues    RESPIRATORY SYMPTOMS      Duration: one week ago    Description  nasal congestion, rhinorrhea and cough    Severity: mild-moderate depending on the time of day    Accompanying signs and symptoms: None    History (predisposing factors):  none    Precipitating or alleviating factors: None    Therapies tried and outcome:  Dayquil and Nyquil-symptoms persisting        Problem list and histories reviewed & adjusted, as indicated.      OBJECTIVE:     /86  Pulse 102  Temp 97.7  F (36.5  C) (Oral)  Wt 218 lb 4.8 oz (99 kg)  BMI 35.23 kg/m2  Body mass index is 35.23 kg/(m^2).  GEN: No distress  SKIN: No rashes  HEENT: PERRL. EOMI. TM's clear bilaterally. Nasal mucosa edematous. OP moist without lesions.  NECK: Supple. No LAD or TM.  LUNGS: Clear to auscultation bilaterally. No rhonchi, rales, wheezes or retractions.  CV: RRR. Systolic murmur. No LE edema    ASSESSMENT/PLAN:       ICD-10-CM    1. Acute rhinosinusitis J01.90 azithromycin (ZITHROMAX) 500 MG tablet     Patient Instructions   Azithromycin 500mg (antibiotic)   1 pill per day for 3 days   The medication continues working for 1 week after you stop taking the pills      Rajeev Barnes MD  [facility]

## 2018-08-13 NOTE — PATIENT INSTRUCTIONS
Azithromycin 500mg (antibiotic)   1 pill per day for 3 days   The medication continues working for 1 week after you stop taking the pills

## 2018-08-13 NOTE — MR AVS SNAPSHOT
"              After Visit Summary   2018    Judah Candelaria    MRN: [id number]           Patient Information     Date Of Birth          1985        Visit Information        Provider Department      2018 4:20 PM [facility]        Today's Diagnoses     Acute rhinosinusitis    -  1      Care Instructions    Azithromycin 500mg (antibiotic)   1 pill per day for 3 days   The medication continues working for 1 week after you stop taking the pills            Follow-ups after your visit        Who to contact     If you have questions or need follow up information about today's clinic visit or your schedule please contact [facility] directly at [phone].  Normal or non-critical lab and imaging results will be communicated to you by MyChart, letter or phone within 4 business days after the clinic has received the results. If you do not hear from us within 7 days, please contact the clinic through MyChart or phone. If you have a critical or abnormal lab result, we will notify you by phone as soon as possible.  Submit refill requests through [employer] or call your pharmacy and they will forward the refill request to us. Please allow 3 business days for your refill to be completed.          Additional Information About Your Visit        MyChart Information     [employer] lets you send messages to your doctor, view your test results, renew your prescriptions, schedule appointments and more. To sign up, go to [email or web address] . Click on \"Log in\" on the left side of the screen, which will take you to the Welcome page. Then click on \"Sign up Now\" on the right side of the page.     You will be asked to enter the access code listed below, as well as some personal information. Please follow the directions to create your username and password.     Your access code is: [email or web address]  Expires: 2018  4:48 PM     Your access code will  in 90 days. If you need help " or a new code, please call your [place] clinic or [phone].        Care EveryWhere ID     This is your Care EveryWhere ID. This could be used by other organizations to access your [place] medical records  [id number]        Your Vitals Were     Pulse Temperature BMI (Body Mass Index)             102 97.7  F (36.5  C) (Oral) 35.23 kg/m2          Blood Pressure from Last 3 Encounters:   08/13/18 146/86   05/04/18 121/82   03/19/18 110/78    Weight from Last 3 Encounters:   08/13/18 218 lb 4.8 oz (99 kg)   05/04/18 210 lb 6.4 oz (95.4 kg)   03/19/18 208 lb 4.8 oz (94.5 kg)              Today, you had the following     No orders found for display         Today's Medication Changes          These changes are accurate as of 8/13/18  4:48 PM.  If you have any questions, ask your nurse or doctor.               Start taking these medicines.        Dose/Directions    azithromycin 500 MG tablet   Commonly known as:  ZITHROMAX   Used for:  Acute rhinosinusitis   Started by:  Rajeev Barnes MD        Dose:  500 mg   Take 1 tablet (500 mg) by mouth daily   Quantity:  3 tablet   Refills:  0            Where to get your medicines      These medications were sent to [place] [address] [place] [facility] [address]  [address] [facility] [address]     Phone:  [phone]     azithromycin 500 MG tablet                Primary Care Provider Office Phone # Fax #    Rajeev Barnes [phone] [address] [place] [address]        Equal Access to Services     [facility]: Hadii zach fernandes hadasho Sojimmie, waaxda luqadaha, qaybta kaalmada federico ray idiaris aragon. So [facility] [phone].    ATENCIÓN: Si habla español, tiene a medrano disposición servicios gratuitos de asistencia lingüística. Llame al [phone].    We comply with applicable federal civil rights laws and [place] laws. We do not discriminate on the basis of race, color, national  origin, age, disability, sex, sexual orientation, or gender identity.            Thank you!     Thank you for choosing [facility]  for your care. Our goal is always to provide you with excellent care. Hearing back from our patients is one way we can continue to improve our services. Please take a few minutes to complete the written survey that you may receive in the mail after your visit with us. Thank you!             Your Updated Medication List - Protect others around you: Learn how to safely use, store and throw away your medicines at www.disposemymeds.org.          This list is accurate as of 8/13/18  4:48 PM.  Always use your most recent med list.                   Brand Name Dispense Instructions for use Diagnosis    azithromycin 500 MG tablet    ZITHROMAX    3 tablet    Take 1 tablet (500 mg) by mouth daily    Acute rhinosinusitis       fexofenadine-pseudoePHEDrine 180-240 MG per 24 hr tablet    ALLEGRA-D 24    90 tablet    Take 1 tablet by mouth daily    Chronic rhinitis, unspecified type

## 2018-09-23 DIAGNOSIS — J31.0 CHRONIC RHINITIS, UNSPECIFIED TYPE: ICD-10-CM

## 2018-09-23 NOTE — TELEPHONE ENCOUNTER
Requested Prescriptions   Pending Prescriptions Disp Refills     fexofenadine-pseudoePHEDrine (ALLEGRA-D 24) 180-240 MG per 24 hr tablet  Last Written Prescription Date:  04/24/2018  Last Fill Quantity: 90 tablet,  # refills: 1   Last office visit: 8/13/2018 with prescribing provider:  Rajeev Barnes MD    Future Office Visit:     90 tablet 1     Sig: Take 1 tablet by mouth daily    There is no refill protocol information for this order     Routing refill request to provider for review/approval because:  Drug not on the G, P or [facility] refill protocol or controlled substance

## 2018-09-25 RX ORDER — FEXOFENADINE HCL AND PSEUDOEPHEDRINE HCL 180; 240 MG/1; MG/1
1 TABLET, EXTENDED RELEASE ORAL DAILY
Qty: 90 TABLET | Refills: 1 | Status: SHIPPED | OUTPATIENT
Start: 2018-09-25 | End: 2019-04-26

## 2019-04-26 DIAGNOSIS — J31.0 CHRONIC RHINITIS: ICD-10-CM

## 2019-04-26 RX ORDER — FEXOFENADINE HCL AND PSEUDOEPHEDRINE HCL 180; 240 MG/1; MG/1
1 TABLET, EXTENDED RELEASE ORAL DAILY
Qty: 90 TABLET | Refills: 1 | Status: SHIPPED | OUTPATIENT
Start: 2019-04-26 | End: 2019-10-08

## 2019-04-26 NOTE — TELEPHONE ENCOUNTER
The pt is out of this medication today and he would like this filled this afternoon please. Thank you.   Keiko Galdamez on 4/26/2019 at 11:57 AM

## 2019-04-26 NOTE — TELEPHONE ENCOUNTER
Routing refill request to provider for review/approval because:  Drug not on the [facility] refill protocol.    Samina Mclaughlin RN - Triage  [facility]

## 2019-05-20 ENCOUNTER — OFFICE VISIT (OUTPATIENT)
Dept: FAMILY MEDICINE | Facility: CLINIC | Age: 34
End: 2019-05-20
Payer: COMMERCIAL

## 2019-05-20 VITALS
OXYGEN SATURATION: 99 % | TEMPERATURE: 98.2 F | RESPIRATION RATE: 16 BRPM | DIASTOLIC BLOOD PRESSURE: 82 MMHG | WEIGHT: 213.2 LBS | HEIGHT: 66 IN | HEART RATE: 103 BPM | SYSTOLIC BLOOD PRESSURE: 122 MMHG | BODY MASS INDEX: 34.27 KG/M2

## 2019-05-20 DIAGNOSIS — J30.2 SEASONAL ALLERGIC RHINITIS, UNSPECIFIED TRIGGER: ICD-10-CM

## 2019-05-20 DIAGNOSIS — R05.3 CHRONIC COUGH: Primary | ICD-10-CM

## 2019-05-20 PROCEDURE — 99213 OFFICE O/P EST LOW 20 MIN: CPT | Performed by: PHYSICIAN ASSISTANT

## 2019-05-20 RX ORDER — AZITHROMYCIN 250 MG/1
TABLET, FILM COATED ORAL
Qty: 6 TABLET | Refills: 0 | Status: ON HOLD | OUTPATIENT
Start: 2019-05-20 | End: 2019-10-17

## 2019-05-20 RX ORDER — BENZONATATE 200 MG/1
200 CAPSULE ORAL 3 TIMES DAILY PRN
Qty: 21 CAPSULE | Refills: 0 | Status: ON HOLD | OUTPATIENT
Start: 2019-05-20 | End: 2019-10-17

## 2019-05-20 ASSESSMENT — MIFFLIN-ST. JEOR: SCORE: 1864.82

## 2019-05-20 NOTE — PROGRESS NOTES
Subjective     Judah Candelaria is a 33 year old male who presents to clinic today for the following health issues:    HPI   RESPIRATORY SYMPTOMS    Duration: 2-3 weeks     Description  nasal congestion and cough    Severity: moderate    Accompanying signs and symptoms: None    History (predisposing factors):  Allergies     Precipitating or alleviating factors: None    Therapies tried and outcome: OTC Allegra D and Flonase     -Patient is a 34yo male who presents for an ongoing hx of cough  -started with some allergy symptoms which did seem to improve with use of allergy medications  -the only things that remains is the dry cough  -he denies any shortness of breath   -cough seems worse in evenings/night time  -at times he'll have coughing spasms that can become so intense that he will gag (vomited once)  -denies any acid reflux symptoms  -no on any chronic medications  -no fevers, chills  -no other close contacts with cough    {  Patient Active Problem List   Diagnosis     Chronic maxillary sinusitis     Chronic rhinitis     Other acne     Congenital atresia and stenosis of aorta     * * * SBE PROPHYLAXIS * * *     CARDIOVASCULAR SCREENING; LDL GOAL LESS THAN 160     Sebaceous cyst     Bicuspid aortic valve     Past Surgical History:   Procedure Laterality Date     C REPR PDA BY LIGATN  11/1988     HC VALVULOPLASTY, AORTIC  10/13/97       Social History     Tobacco Use     Smoking status: Never Smoker     Smokeless tobacco: Never Used   Substance Use Topics     Alcohol use: Yes     Alcohol/week: 0.0 oz     Comment: rare     Family History   Problem Relation Age of Onset     Hypertension Maternal Grandmother      Hypertension Maternal Grandfather         Heart Disease     Cancer Maternal Grandfather         skin/Prostate     Coronary Artery Disease Maternal Grandfather      Prostate Cancer Maternal Grandfather      Coronary Artery Disease Paternal Grandfather      Family History Negative Mother      Family History  "Negative Father            Reviewed and updated as needed this visit by Provider         Review of Systems   ROS COMP: Constitutional, HEENT, cardiovascular, pulmonary, gi and gu systems are negative, except as otherwise noted.      Objective    /82   Pulse 103   Temp 98.2  F (36.8  C) (Tympanic)   Resp 16   Ht 1.676 m (5' 6\")   Wt 96.7 kg (213 lb 3.2 oz)   SpO2 99%   BMI 34.41 kg/m    Body mass index is 34.41 kg/m .  Physical Exam   GENERAL: healthy, alert and no distress  EYES: Eyes grossly normal to inspection, PERRL and conjunctivae and sclerae normal  HENT: normal cephalic/atraumatic, ear canals and TM's normal, nasal mucosa edematous , oropharynx clear and oral mucous membranes moist  NECK: no adenopathy  RESP: lungs clear to auscultation - no rales, rhonchi or wheezes  CV: regular rates and rhythm    Diagnostic Test Results:  none         Assessment & Plan     1. Chronic cough  2. Seasonal allergic rhinitis, unspecified trigger  No evidence for CAP or cardiac origin. Trial tessalon perles and increased allergy treatment for the remainder of this week. If symptoms not improving or worsening, ok to trial zpack given his cough spasms and posttussive vomiting to cover any atypical infection. Follow-up if not improving following this.  - benzonatate (TESSALON) 200 MG capsule; Take 1 capsule (200 mg) by mouth 3 times daily as needed for cough  Dispense: 21 capsule; Refill: 0  - azithromycin (ZITHROMAX) 250 MG tablet; Two tablets first day, then one tablet daily for four days.  Dispense: 6 tablet; Refill: 0    Return in about 2 weeks (around 6/3/2019) for recheck if symptoms are not improving..    Prakash Granados PA-C  [facility]    "

## 2019-05-20 NOTE — PROGRESS NOTES
"Mike WHELAN [place] is a 33 year old male who presents to clinic today for the following health issues:    HPI   RESPIRATORY SYMPTOMS      Duration: ***    Description  { :782383}    Severity: {MILD, MODERATE, SEVERE LOW:551491}    Accompanying signs and symptoms: {NONE DEFAULTED:843254::\"None\"}    History (predisposing factors):  { :287199::\"none\"}    Precipitating or alleviating factors: {NONE DEFAULTED:932886::\"None\"}    Therapies tried and outcome:  { :540101::\"none\"}    {{HIST REVIEW/ LINKS 2 (Optional):514396}    Reviewed and updated as needed this visit by Provider         Review of Systems   {ROS COMP (Optional):827207}      Objective    There were no vitals taken for this visit.  There is no height or weight on file to calculate BMI.  Physical Exam   {Exam List (Optional):026456}    {Diagnostic Test Results (Optional):048027::\"Diagnostic Test Results:\",\"Labs reviewed in Epic\"}        {PROVIDER CHARTING PREFERENCE:813134}    "

## 2019-10-05 DIAGNOSIS — J31.0 CHRONIC RHINITIS: ICD-10-CM

## 2019-10-06 NOTE — TELEPHONE ENCOUNTER
Pt requests new Rx for allegra d 24hrs    Requested Prescriptions   Pending Prescriptions Disp Refills     fexofenadine-pseudoePHEDrine (ALLEGRA-D 24) 180-240 MG 24 hr tablet  Last Written Prescription Date:  04/26/2019  Last Fill Quantity: 90 tablet,  # refills: 1   Last Office Visit: 8/13/2018 Rajeev Barnes MD   Future Office Visit:      90 tablet 1     Sig: Take 1 tablet by mouth daily       There is no refill protocol information for this order     Routing refill request to provider for review/approval because:  Drug not on the [facility], P or [facility] refill protocol or controlled substance

## 2019-10-07 NOTE — TELEPHONE ENCOUNTER
Routing refill request to provider for review/approval because:  Drug not on the [facility] refill protocol     Anthony Ellis RN   [facility]

## 2019-10-08 RX ORDER — FEXOFENADINE HCL AND PSEUDOEPHEDRINE HCL 180; 240 MG/1; MG/1
1 TABLET, EXTENDED RELEASE ORAL DAILY
Qty: 90 TABLET | Refills: 1 | Status: SHIPPED | OUTPATIENT
Start: 2019-10-08 | End: 2020-05-28

## 2019-10-11 ENCOUNTER — HOSPITAL ENCOUNTER (EMERGENCY)
Facility: CLINIC | Age: 34
Discharge: PSYCHIATRIC OR SUBSTANCE USE FACILITY | End: 2019-10-11
Attending: EMERGENCY MEDICINE | Admitting: EMERGENCY MEDICINE
Payer: COMMERCIAL

## 2019-10-11 ENCOUNTER — HOSPITAL ENCOUNTER (INPATIENT)
Facility: CLINIC | Age: 34
LOS: 10 days | Discharge: HOME | DRG: 885 | End: 2019-10-21
Attending: PSYCHIATRY & NEUROLOGY | Admitting: PSYCHIATRY & NEUROLOGY
Payer: COMMERCIAL

## 2019-10-11 ENCOUNTER — NURSE TRIAGE (OUTPATIENT)
Dept: NURSING | Facility: CLINIC | Age: 34
End: 2019-10-11

## 2019-10-11 ENCOUNTER — APPOINTMENT (OUTPATIENT)
Dept: CT IMAGING | Facility: CLINIC | Age: 34
End: 2019-10-11
Attending: EMERGENCY MEDICINE
Payer: COMMERCIAL

## 2019-10-11 VITALS
HEIGHT: 66 IN | TEMPERATURE: 98.6 F | WEIGHT: 213.85 LBS | SYSTOLIC BLOOD PRESSURE: 141 MMHG | OXYGEN SATURATION: 100 % | DIASTOLIC BLOOD PRESSURE: 89 MMHG | HEART RATE: 102 BPM | BODY MASS INDEX: 34.37 KG/M2 | RESPIRATION RATE: 16 BRPM

## 2019-10-11 DIAGNOSIS — F22 PARANOIA (H): ICD-10-CM

## 2019-10-11 DIAGNOSIS — R45.851 SUICIDAL IDEATION: ICD-10-CM

## 2019-10-11 DIAGNOSIS — F29 PSYCHOSIS, UNSPECIFIED PSYCHOSIS TYPE (H): Primary | ICD-10-CM

## 2019-10-11 LAB
ALBUMIN SERPL-MCNC: 3.9 G/DL (ref 3.4–5)
ALP SERPL-CCNC: 106 U/L (ref 40–150)
ALT SERPL W P-5'-P-CCNC: 53 U/L (ref 0–70)
AMPHETAMINES UR QL SCN: NEGATIVE
ANION GAP SERPL CALCULATED.3IONS-SCNC: 1 MMOL/L (ref 3–14)
APAP SERPL-MCNC: <2 MG/L (ref 10–20)
AST SERPL W P-5'-P-CCNC: 27 U/L (ref 0–45)
BARBITURATES UR QL: NEGATIVE
BASOPHILS # BLD AUTO: 0.1 10E9/L (ref 0–0.2)
BASOPHILS NFR BLD AUTO: 1.1 %
BENZODIAZ UR QL: NEGATIVE
BILIRUB SERPL-MCNC: 0.4 MG/DL (ref 0.2–1.3)
BUN SERPL-MCNC: 10 MG/DL (ref 7–30)
CALCIUM SERPL-MCNC: 9 MG/DL (ref 8.5–10.1)
CANNABINOIDS UR QL SCN: NEGATIVE
CHLORIDE SERPL-SCNC: 105 MMOL/L (ref 94–109)
CO2 SERPL-SCNC: 33 MMOL/L (ref 20–32)
COCAINE UR QL: NEGATIVE
CREAT SERPL-MCNC: 0.88 MG/DL (ref 0.66–1.25)
DIFFERENTIAL METHOD BLD: ABNORMAL
EOSINOPHIL # BLD AUTO: 0.2 10E9/L (ref 0–0.7)
EOSINOPHIL NFR BLD AUTO: 1.6 %
ERYTHROCYTE [DISTWIDTH] IN BLOOD BY AUTOMATED COUNT: 13.6 % (ref 10–15)
GFR SERPL CREATININE-BSD FRML MDRD: >90 ML/MIN/{1.73_M2}
GLUCOSE SERPL-MCNC: 105 MG/DL (ref 70–99)
HCT VFR BLD AUTO: 50.8 % (ref 40–53)
HGB BLD-MCNC: 16.1 G/DL (ref 13.3–17.7)
IMM GRANULOCYTES # BLD: 0.1 10E9/L (ref 0–0.4)
IMM GRANULOCYTES NFR BLD: 0.5 %
LYMPHOCYTES # BLD AUTO: 2.5 10E9/L (ref 0.8–5.3)
LYMPHOCYTES NFR BLD AUTO: 20.2 %
MCH RBC QN AUTO: 28 PG (ref 26.5–33)
MCHC RBC AUTO-ENTMCNC: 31.7 G/DL (ref 31.5–36.5)
MCV RBC AUTO: 88 FL (ref 78–100)
MONOCYTES # BLD AUTO: 1.1 10E9/L (ref 0–1.3)
MONOCYTES NFR BLD AUTO: 8.6 %
NEUTROPHILS # BLD AUTO: 8.5 10E9/L (ref 1.6–8.3)
NEUTROPHILS NFR BLD AUTO: 68 %
NRBC # BLD AUTO: 0 10*3/UL
NRBC BLD AUTO-RTO: 0 /100
OPIATES UR QL SCN: NEGATIVE
PCP UR QL SCN: NEGATIVE
PLATELET # BLD AUTO: 403 10E9/L (ref 150–450)
POTASSIUM SERPL-SCNC: 4.3 MMOL/L (ref 3.4–5.3)
PROT SERPL-MCNC: 7.7 G/DL (ref 6.8–8.8)
RBC # BLD AUTO: 5.76 10E12/L (ref 4.4–5.9)
SODIUM SERPL-SCNC: 139 MMOL/L (ref 133–144)
TSH SERPL DL<=0.005 MIU/L-ACNC: 2.01 MU/L (ref 0.4–4)
WBC # BLD AUTO: 12.6 10E9/L (ref 4–11)

## 2019-10-11 PROCEDURE — 80307 DRUG TEST PRSMV CHEM ANLYZR: CPT | Performed by: EMERGENCY MEDICINE

## 2019-10-11 PROCEDURE — 12400001 ZZH R&B MH UMMC

## 2019-10-11 PROCEDURE — 90791 PSYCH DIAGNOSTIC EVALUATION: CPT

## 2019-10-11 PROCEDURE — 99285 EMERGENCY DEPT VISIT HI MDM: CPT | Mod: 25

## 2019-10-11 PROCEDURE — 85025 COMPLETE CBC W/AUTO DIFF WBC: CPT | Performed by: EMERGENCY MEDICINE

## 2019-10-11 PROCEDURE — 84443 ASSAY THYROID STIM HORMONE: CPT | Performed by: EMERGENCY MEDICINE

## 2019-10-11 PROCEDURE — 70450 CT HEAD/BRAIN W/O DYE: CPT

## 2019-10-11 PROCEDURE — 80329 ANALGESICS NON-OPIOID 1 OR 2: CPT | Performed by: EMERGENCY MEDICINE

## 2019-10-11 PROCEDURE — 80053 COMPREHEN METABOLIC PANEL: CPT | Performed by: EMERGENCY MEDICINE

## 2019-10-11 RX ORDER — ACETAMINOPHEN 325 MG/1
650 TABLET ORAL EVERY 4 HOURS PRN
Status: DISCONTINUED | OUTPATIENT
Start: 2019-10-11 | End: 2019-10-21 | Stop reason: HOSPADM

## 2019-10-11 RX ORDER — POLYETHYLENE GLYCOL 3350 17 G
2-4 POWDER IN PACKET (EA) ORAL
Status: DISCONTINUED | OUTPATIENT
Start: 2019-10-11 | End: 2019-10-21 | Stop reason: HOSPADM

## 2019-10-11 RX ORDER — HYDROXYZINE HYDROCHLORIDE 25 MG/1
25 TABLET, FILM COATED ORAL EVERY 4 HOURS PRN
Status: DISCONTINUED | OUTPATIENT
Start: 2019-10-11 | End: 2019-10-14 | Stop reason: CLARIF

## 2019-10-11 RX ORDER — TRAZODONE HYDROCHLORIDE 50 MG/1
50 TABLET, FILM COATED ORAL
Status: DISCONTINUED | OUTPATIENT
Start: 2019-10-11 | End: 2019-10-18

## 2019-10-11 RX ORDER — OLANZAPINE 10 MG/2ML
10 INJECTION, POWDER, FOR SOLUTION INTRAMUSCULAR
Status: DISCONTINUED | OUTPATIENT
Start: 2019-10-11 | End: 2019-10-14

## 2019-10-11 RX ORDER — ALUMINA, MAGNESIA, AND SIMETHICONE 2400; 2400; 240 MG/30ML; MG/30ML; MG/30ML
30 SUSPENSION ORAL EVERY 4 HOURS PRN
Status: DISCONTINUED | OUTPATIENT
Start: 2019-10-11 | End: 2019-10-21 | Stop reason: HOSPADM

## 2019-10-11 RX ORDER — OLANZAPINE 10 MG/1
10 TABLET ORAL
Status: DISCONTINUED | OUTPATIENT
Start: 2019-10-11 | End: 2019-10-14 | Stop reason: CLARIF

## 2019-10-11 ASSESSMENT — ENCOUNTER SYMPTOMS
AGITATION: 1
DIARRHEA: 0
WEAKNESS: 0
VOMITING: 1
HALLUCINATIONS: 1

## 2019-10-11 ASSESSMENT — MIFFLIN-ST. JEOR: SCORE: 1862.75

## 2019-10-11 NOTE — TELEPHONE ENCOUNTER
Mother calls and says that her son lives with her and his father and that he just grabbed his stuff and ran out of the house, to his friend's house. Mother says that her son has been hearing voices and has suicidal thoughts. Mother says that her son thinks people are trying to harm him and think he is homosexual. Because pt. Was not with caller, triage could not be done. Because of nursing judgement, this nurse told mother that she needs to call 911 and have an ambulance take her son to an ER now. Mother says that his friend is going to take Judah to the [facility] ER now. RN did tell mother to tell his friend to make sure that pt. Has no access to anything sharp-knives, scissors. Mother voiced understanding.    Reason for Disposition    Health Information question, no triage required and triager able to answer question    Additional Information    Negative: [1] Caller is not with the adult (patient) AND [2] reporting urgent symptoms    Negative: Lab result questions    Negative: Medication questions    Negative: Caller can't be reached by phone    Negative: Caller has already spoken to PCP or another triager    Negative: RN needs further essential information from caller in order to complete triage    Negative: Requesting regular office appointment    Negative: [1] Caller requesting NON-URGENT health information AND [2] PCP's office is the best resource    Protocols used: INFORMATION ONLY CALL-A-

## 2019-10-11 NOTE — ED PROVIDER NOTES
"History     Chief Complaint:  Depression    HPI  Judah Candelaria is a 34 year old male who presents to the emergency department today with two friends for evaluation of depression. The patient reports that his friends asked him to come into the emergency department today as they were concerned with the patient. He states that he thought there were people at work harassing him. He states that he is being accused by friends of going to places that he believes that he did not go to. He says that there is a possibly that he is being stalked. He believes that he is hearing voices and this has been going on for around six months. He originally thought this was due his phone, but he changed his phone number and the hallucinations still persist.    He states that, on two occasions, he \"snapped and thought about killing himself\", once two weeks ago and once today. He states this is due to the fact that he continues to hear voices. He states that he got upset, walked to a [employer] parking lot, and did think about walking into traffic during this time. He states these events happen frequently, throughout the day, and commonly in the morning when he is half-awake. He explains that a voice he hears did encourage him to kill himself when he was considering suicide. He does note that, if he does get worked up from these hallucinations, he has thrown up. The patient denies recent sickness, fever, diarrhea, weakness, or suicide attempts in the past.    Allergies:  Augmentin  Clindamycin    Medications:    Azithromycin   Benzonatate   Allegra-D 24    Past Medical History:    Chronic maxillary sinusitis  Chronic rhinitis  Other acne  Congenital atresia and stenosis of aorta  Subacute bacterial endocarditis: Prophylaxis  Sebaceous cyst  Bicuspid aortic valve    Past Surgical History:    C rerp PDA by ligation  HC valvuloplasty, aortic    Family History:    The patient's family history includes Cancer in his maternal grandfather; " "Coronary Artery Disease in his maternal grandfather and paternal grandfather; Family History Negative in his father and mother; Hypertension in his maternal grandfather and maternal grandmother; Prostate Cancer in his maternal grandfather.    Social History:  The patient reports that he has never smoked. He has never used smokeless tobacco. He reports current alcohol use. He reports that he does not use drugs. He does not take any drugs, but drinks 1-2 alcoholic drinks once every couple weeks.   PCP: Rajeev Barnes  Marital Status: Single     Review of Systems   Gastrointestinal: Positive for vomiting. Negative for diarrhea.   Neurological: Negative for weakness.   Psychiatric/Behavioral: Positive for agitation, hallucinations (hearing voices) and suicidal ideas.   All other systems reviewed and are negative.    Physical Exam     Patient Vitals for the past 24 hrs:   BP Temp Temp src Pulse Heart Rate Resp SpO2 Height Weight   10/11/19 2214 (!) 141/89 -- -- -- 99 16 100 % -- --   10/11/19 2031 -- -- -- 102 -- 18 99 % -- --   10/11/19 1800 -- -- -- -- -- -- 98 % -- --   10/11/19 1745 (!) 136/98 -- -- 111 -- -- 99 % -- --   10/11/19 1730 (!) 143/98 -- -- 111 -- -- 98 % -- --   10/11/19 1715 (!) 146/89 -- -- -- -- -- -- -- --   10/11/19 1700 (!) 145/84 98.6  F (37  C) Oral -- 100 20 98 % 1.676 m (5' 6\") 97 kg (213 lb 13.5 oz)     Physical Exam  General: Sitting up in bed  Eyes:  The pupils are equal and round    Conjunctivae and sclerae are normal  ENT:    Moist mucous membranes  Neck:  Normal range of motion  CV:  Regular rate and rhythm    Skin warm and well perfused   Resp:  Lungs are clear    Non-labored    No rales    No wheezing   GI:  Abdomen is soft, there is no rigidity    No distension    No rebound tenderness     No abdominal tenderness  MS:  Normal muscular tone  Skin:  No rash or acute skin lesions noted  Neuro:   Awake, alert.      Speech is normal and fluent.    Face is symmetric.     Moves all " extremities equally    SILT on bilateral UE/LE    EOMI    Pupils equal bilaterally    Finger to nose intact  Psych: Good eye contact, does not appear to be responding to internal stimuli    Emergency Department Course     Imaging:  Radiology findings were communicated with the patient who voiced understanding of the findings.    Head CT w/o contrast  1.  Normal head CT.    Report(s) per radiology.     Laboratory:  Laboratory findings were communicated with the patient who voiced understanding of the findings.    Drug abuse screen 77 urine: All negative  CBC: WBC: 12.6 (H), HGB 16.1, .  CMP: Carbon Dioxide 33 (H), Anion Gap 1 (L), Glucose 105 (H), otherwise within normal limits (Creatinine 0.88).  TSH with free T4 reflex: 2.01  Acetaminophen level: <2    Emergency Department Course:  1730 Nursing notes and vitals reviewed.  1738 I performed a physical examination of the patient as documented above.  1801 IV was inserted and blood was drawn for laboratory testing, results above.  1904 I spoke to the patient regarding potential CT imaging of the head.  1949 The patient was sent for a Head CT w/o contrast while in the emergency department, results above.   2032 I updated the patient regarding their imaging.   2217 I personally reviewed the laboratory and imaging results with the patient and answered all related questions prior to transfer.    Impression & Plan      Medical Decision Making:  Judah Candelaria is a 34 year old male who presents to the emergency department today for evaluation of paranoia and SI. Patient with new onset psychosis over last 3-6 months. Also having SI. No hx of similar symptoms. Labs and CT head do not show an alternative cause of new psychosis. DEC evaluated the patient and recommended psych admission. Transferred to [facility] for admission.    Diagnosis:      ICD-10-CM    1. Paranoia (H) F22    2. Suicidal ideation R45.851      Disposition:   Findings and plan explained to the  patient. Patient will be transferred to [facility] via EMS to a monitored bed for further evaluation and treatment.    Scribe Disclosure:  I, Sadiq Motley, am serving as a scribe at 5:11 PM on 10/11/2019 to document services personally performed by Estrella Soria MD based on my observations and the provider's statements to me.    [facility] EMERGENCY DEPARTMENT     Estrella Soria MD  10/12/19 0129

## 2019-10-11 NOTE — ED TRIAGE NOTES
Pt has felt depressede for past few months and is here for mental health evaluation.  He was seen by therapist up until a few months ago.

## 2019-10-12 LAB
ALBUMIN SERPL-MCNC: 3.9 G/DL (ref 3.4–5)
ALBUMIN UR-MCNC: NEGATIVE MG/DL
ALP SERPL-CCNC: 108 U/L (ref 40–150)
ALT SERPL W P-5'-P-CCNC: 48 U/L (ref 0–70)
APPEARANCE UR: CLEAR
AST SERPL W P-5'-P-CCNC: 20 U/L (ref 0–45)
BILIRUB DIRECT SERPL-MCNC: 0.2 MG/DL (ref 0–0.2)
BILIRUB SERPL-MCNC: 0.8 MG/DL (ref 0.2–1.3)
BILIRUB UR QL STRIP: NEGATIVE
CHOLEST SERPL-MCNC: 136 MG/DL
COLOR UR AUTO: YELLOW
FOLATE SERPL-MCNC: 12.2 NG/ML
GLUCOSE UR STRIP-MCNC: NEGATIVE MG/DL
HCT VFR BLD AUTO: 51.9 % (ref 40–53)
HDLC SERPL-MCNC: 37 MG/DL
HGB UR QL STRIP: NEGATIVE
KETONES UR STRIP-MCNC: NEGATIVE MG/DL
LDLC SERPL CALC-MCNC: 77 MG/DL
LEUKOCYTE ESTERASE UR QL STRIP: NEGATIVE
MUCOUS THREADS #/AREA URNS LPF: PRESENT /LPF
NITRATE UR QL: NEGATIVE
NONHDLC SERPL-MCNC: 99 MG/DL
PH UR STRIP: 7 PH (ref 5–7)
PROT SERPL-MCNC: 7.8 G/DL (ref 6.8–8.8)
RBC #/AREA URNS AUTO: <1 /HPF (ref 0–2)
SOURCE: ABNORMAL
SP GR UR STRIP: 1.01 (ref 1–1.03)
TRIGL SERPL-MCNC: 110 MG/DL
UROBILINOGEN UR STRIP-MCNC: NORMAL MG/DL (ref 0–2)
VIT B12 SERPL-MCNC: 424 PG/ML (ref 193–986)
WBC #/AREA URNS AUTO: <1 /HPF (ref 0–5)

## 2019-10-12 PROCEDURE — 99223 1ST HOSP IP/OBS HIGH 75: CPT | Mod: AI | Performed by: PSYCHIATRY & NEUROLOGY

## 2019-10-12 PROCEDURE — 25000132 ZZH RX MED GY IP 250 OP 250 PS 637: Performed by: STUDENT IN AN ORGANIZED HEALTH CARE EDUCATION/TRAINING PROGRAM

## 2019-10-12 PROCEDURE — 12400001 ZZH R&B MH UMMC

## 2019-10-12 PROCEDURE — 81001 URINALYSIS AUTO W/SCOPE: CPT | Performed by: STUDENT IN AN ORGANIZED HEALTH CARE EDUCATION/TRAINING PROGRAM

## 2019-10-12 PROCEDURE — 85014 HEMATOCRIT: CPT | Performed by: STUDENT IN AN ORGANIZED HEALTH CARE EDUCATION/TRAINING PROGRAM

## 2019-10-12 PROCEDURE — 82746 ASSAY OF FOLIC ACID SERUM: CPT | Performed by: STUDENT IN AN ORGANIZED HEALTH CARE EDUCATION/TRAINING PROGRAM

## 2019-10-12 PROCEDURE — 25000132 ZZH RX MED GY IP 250 OP 250 PS 637: Performed by: PSYCHIATRY & NEUROLOGY

## 2019-10-12 PROCEDURE — 36415 COLL VENOUS BLD VENIPUNCTURE: CPT | Performed by: STUDENT IN AN ORGANIZED HEALTH CARE EDUCATION/TRAINING PROGRAM

## 2019-10-12 PROCEDURE — 80061 LIPID PANEL: CPT | Performed by: STUDENT IN AN ORGANIZED HEALTH CARE EDUCATION/TRAINING PROGRAM

## 2019-10-12 PROCEDURE — 82607 VITAMIN B-12: CPT | Performed by: STUDENT IN AN ORGANIZED HEALTH CARE EDUCATION/TRAINING PROGRAM

## 2019-10-12 PROCEDURE — 80076 HEPATIC FUNCTION PANEL: CPT | Performed by: STUDENT IN AN ORGANIZED HEALTH CARE EDUCATION/TRAINING PROGRAM

## 2019-10-12 RX ORDER — ARIPIPRAZOLE 2 MG/1
2 TABLET ORAL AT BEDTIME
Status: DISCONTINUED | OUTPATIENT
Start: 2019-10-12 | End: 2019-10-15

## 2019-10-12 RX ADMIN — ARIPIPRAZOLE 2 MG: 2 TABLET ORAL at 21:38

## 2019-10-12 RX ADMIN — OLANZAPINE 10 MG: 10 TABLET, FILM COATED ORAL at 00:17

## 2019-10-12 RX ADMIN — HYDROXYZINE HYDROCHLORIDE 25 MG: 25 TABLET, FILM COATED ORAL at 21:38

## 2019-10-12 RX ADMIN — TRAZODONE HYDROCHLORIDE 50 MG: 50 TABLET ORAL at 21:38

## 2019-10-12 ASSESSMENT — ACTIVITIES OF DAILY LIVING (ADL)
HYGIENE/GROOMING: PROMPTS;INDEPENDENT
DRESS: INDEPENDENT
LAUNDRY: UNABLE TO COMPLETE
ORAL_HYGIENE: PROMPTS;INDEPENDENT
COGNITION: 2 - DIFFICULTY WITH ORGANIZING THOUGHTS
SWALLOWING: 0-->SWALLOWS FOODS/LIQUIDS WITHOUT DIFFICULTY
TRANSFERRING: 0-->INDEPENDENT
DRESS: PROMPTS;INDEPENDENT
FALL_HISTORY_WITHIN_LAST_SIX_MONTHS: NO
RETIRED_COMMUNICATION: 2-->DIFFICULTY SPEAKING (NOT RELATED TO LANGUAGE BARRIER)
ORAL_HYGIENE: INDEPENDENT
DRESS: 0-->INDEPENDENT
AMBULATION: 0-->INDEPENDENT
BATHING: 0-->INDEPENDENT
HYGIENE/GROOMING: INDEPENDENT
TOILETING: 0-->INDEPENDENT
LAUNDRY: UNABLE TO COMPLETE
RETIRED_EATING: 0-->INDEPENDENT

## 2019-10-12 NOTE — H&P
"History and Physical    Judah Candelaria MRN# [id number]   Age: 34 year old YOB: 1985     Date of Admission:  10/11/2019        Primary Outpatient Psychiatrist: None   Primary Physician:  Rajeev Barnes  Therapist: None  [facility] : N/A  Family Members: N/A         Chief Complaint:   \"Hearing voices\"         History of Present Illness:   History obtained from patient interview, chart review.  Pt interviewed with the attending and the resident on 10/12/19 at approximately 11:00 AM.    This patient is a 34 year old male with past history of developmental delay who presented on 10/12/2019 due to increased depression and AH.  The patient reports feeling increasingly depressed over few month and having gradually increasing AH over the last 6 months.  He told two of his friends about his feelings and they convinced him to come to [facility] ED.  He was medically cleared for admission to inpatient psychiatric unit.  The patient reported at first thinking that people at work were harassing him, feeling stalked, and that his phone was causing him to hear things.  He went as far as changing his phone number but realized that the AH have continued.  At times the AH have been commanding, encouraging him to commit suicide.  Per history, he has been experiencing depression with SI for some time, and at one point, the patient considered to walk into traffic in the context of hearing AH that encouraged him to do so.    Last time he heard AV was last night but denies the AV this morning.  Overall, the patient finds it difficult to talk about his experience of AH or paranoia, often taking long pauses and not answering questions.  It is noted that he is able to discuss other aspects of his hisotry without hesitancy.     The patient has no prior psychiatric hospitalizations or psychotropic treatment.  He has been in counseling for depression x few months but quit several months ago as he was not finding " it helpful.  No significant CD history and UDS negative on admission.  First psychosis work up in the ED including typical admission labs and CT negative.    The risks, benefits, alternatives and side effects have been discussed and are understood by the patient and other caregivers.       Psychiatric Review of Systems:   Depressive Sx: Depressed mood, SI, Guilt, low energy, hopelessness, slowed thinking  Manic Sx: none  Psychosis: AH paranoia  Anxiety Sx: worries and rumminations  PTSD: none  ADHD: none  Antisocial: none  ASD: none  ED: none  Cluster B: none         Medical Review of Systems:   The Review of Systems is negative other than noted in the HPI         Psychiatric History:     Prior diagnoses:Depression per patient    Hospitalizations: No prior psychiatric hospitalizations    Suicide attempts: None but passive SI present    Self-injurious behavior: None    Violence: None    ECT/TMS: None    Past medications: None         Substance Use History:     Nicotine: Never smoked    Alcohol: Denies       Cannabis: Denies    Others: Denies    Prior CD treatments: none         Past Medical History:   No past medical history on file.  Past Surgical History:   Procedure Laterality Date     C REPR PDA BY ALLIATN  11/1988     HC VALVULOPLASTY, AORTIC  10/13/97     No History of seizures or head trauma.       Allergies:      Allergies   Allergen Reactions     Augmentin Nausea     Clindamycin Nausea and Vomiting          Medications:     No current outpatient medications on file.           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    Legal: No history of legal issues    Abuse/Trauma: Patient denied other than the trauma of experiencing the new mental health symptoms             Family History:   The patient  denied significant family hx of psychiatric illness    Family History   Problem Relation Age of Onset     Hypertension Maternal Grandmother      Hypertension Maternal Grandfather         Heart Disease     Cancer Maternal Grandfather         skin/Prostate     Coronary Artery Disease Maternal Grandfather      Prostate Cancer Maternal Grandfather      Coronary Artery Disease Paternal Grandfather      Family History Negative Mother      Family History Negative Father             Labs:     Recent Results (from the past 24 hour(s))   CBC with platelets differential    Collection Time: 10/11/19  6:01 PM   Result Value Ref Range    WBC 12.6 (H) 4.0 - 11.0 10e9/L    RBC Count 5.76 4.4 - 5.9 10e12/L    Hemoglobin 16.1 13.3 - 17.7 g/dL    Hematocrit 50.8 40.0 - 53.0 %    MCV 88 78 - 100 fl    MCH 28.0 26.5 - 33.0 pg    MCHC 31.7 31.5 - 36.5 g/dL    RDW 13.6 10.0 - 15.0 %    Platelet Count 403 150 - 450 10e9/L    Diff Method Automated Method     % Neutrophils 68.0 %    % Lymphocytes 20.2 %    % Monocytes 8.6 %    % Eosinophils 1.6 %    % Basophils 1.1 %    % Immature Granulocytes 0.5 %    Nucleated RBCs 0 0 /100    Absolute Neutrophil 8.5 (H) 1.6 - 8.3 10e9/L    Absolute Lymphocytes 2.5 0.8 - 5.3 10e9/L    Absolute Monocytes 1.1 0.0 - 1.3 10e9/L    Absolute Eosinophils 0.2 0.0 - 0.7 10e9/L    Absolute Basophils 0.1 0.0 - 0.2 10e9/L    Abs Immature Granulocytes 0.1 0 - 0.4 10e9/L    Absolute Nucleated RBC 0.0    Comprehensive metabolic panel    Collection Time: 10/11/19  6:01 PM   Result Value Ref Range    Sodium 139 133 - 144 mmol/L    Potassium 4.3 3.4 - 5.3 mmol/L    Chloride 105 94 - 109 mmol/L    Carbon Dioxide 33 (H) 20 - 32 mmol/L    Anion Gap 1 (L) 3 - 14 mmol/L    Glucose 105 (H) 70 - 99 mg/dL    Urea Nitrogen 10 7 - 30 mg/dL    Creatinine 0.88 0.66 - 1.25 mg/dL    GFR Estimate >90 >60 mL/min/[1.73_m2]    GFR Estimate If Black >90 >60 mL/min/[1.73_m2]    Calcium 9.0 8.5 - 10.1 mg/dL    Bilirubin Total 0.4 0.2 - 1.3  mg/dL    Albumin 3.9 3.4 - 5.0 g/dL    Protein Total 7.7 6.8 - 8.8 g/dL    Alkaline Phosphatase 106 40 - 150 U/L    ALT 53 0 - 70 U/L    AST 27 0 - 45 U/L   TSH with free T4 reflex    Collection Time: 10/11/19  6:01 PM   Result Value Ref Range    TSH 2.01 0.40 - 4.00 mU/L   Acetaminophen level    Collection Time: 10/11/19  6:01 PM   Result Value Ref Range    Acetaminophen Level <2 mg/L   Drug abuse screen 77 urine    Collection Time: 10/11/19  8:00 PM   Result Value Ref Range    Amphetamine Qual Urine Negative NEG^Negative    Barbiturates Qual Urine Negative NEG^Negative    Benzodiazepine Qual Urine Negative NEG^Negative    Cannabinoids Qual Urine Negative NEG^Negative    Cocaine Qual Urine Negative NEG^Negative    Opiates Qualitative Urine Negative NEG^Negative    PCP Qual Urine Negative NEG^Negative   Lipid panel    Collection Time: 10/12/19  8:20 AM   Result Value Ref Range    Cholesterol 136 <200 mg/dL    Triglycerides 110 <150 mg/dL    HDL Cholesterol 37 (L) >39 mg/dL    LDL Cholesterol Calculated 77 <100 mg/dL    Non HDL Cholesterol 99 <130 mg/dL   Hepatic panel    Collection Time: 10/12/19  8:20 AM   Result Value Ref Range    Bilirubin Direct 0.2 0.0 - 0.2 mg/dL    Bilirubin Total 0.8 0.2 - 1.3 mg/dL    Albumin 3.9 3.4 - 5.0 g/dL    Protein Total 7.8 6.8 - 8.8 g/dL    Alkaline Phosphatase 108 40 - 150 U/L    ALT 48 0 - 70 U/L    AST 20 0 - 45 U/L   Hematocrit    Collection Time: 10/12/19  8:20 AM   Result Value Ref Range    Hematocrit 51.9 40.0 - 53.0 %            Psychiatric Examination:     /82 (BP Location: Left arm)   Pulse 99   Temp 97.7  F (36.5  C) (Oral)   Resp 14   SpO2 100%     Appearance:  Alert, cooperative, well groomed, appears stated age  Eye Contact:  good  Mood:  sad  and depressed, anxious  Affect:  mood congruent and intensity is blunted  Speech:  increased speech latency, at times long pauses and not answering questions related to psychosis  Psychomotor Behavior:  intact station,  gait and muscle tone  Thought Process:  linear  Associations:  no loose associations  Thought Content:  passive suicidal ideation present and auditory hallucinations present  Insight:  fair  Judgment:  fair  Oriented to:  time, person, and place  Attention Span and Concentration:  intact  Recent and Remote Memory:  fair  Language:  english with appropriate syntax and vocabulary             Physical Exam:     See ED assessment note by Dr. Soria on 10/12/2019          Assessment   This patient is a 34 year old male with history of depression who presented to [facility] ED  on 10/12/2019 due to increasing AH.  This is in the context of chronic depression and intermittent SI  MSE was notable for prolonged pausing and hesitancy to discuss his psychotic symptoms.  Possible thought blocking versus on-going PI and/or distress related to psychotic symptoms.     Reason for inpatient hospitalization is to address psychotic and long-standing depressed mood symptoms.  At this time, we will initiate low dose anti-psychotic Abilify 2 mg at bedtime.  Risks/Benefits were discussed with the patient and consent was signed.  The patient is likely to benefit from anti-depressant as well given his report of depression prior to the psychotic symptoms onset.  Primary team to re-evaluate on Monday.  Please refer to comprehensive DEC assessment for additional recent history.           Plan   Admit to Unit 22 with Attending Physician Vania Pabon  Legal Status: Voluntary    Safety Assessment:    Restrict to unit  Regular programming  Routine suicide precautions    Principal psychiatric diagnosis:   # Major Depressive Disorder with psychosis versus Psychosis NEC / Schizophrenia    Secondary psychiatric diagnoses:   # None    Medications:   Outpatient medications held:    None    Outpatient medications continued:   None    New medications initiated:   -Abilify 5 mg po at bedtime.  Consent obtained  -IM/PO Olanzapine 10mg Q2H PRN aggression/agitation  and regular prn admission orders    - Patient will be treated in therapeutic milieu with appropriate individual and group therapies.      Medical diagnoses:      #hx of congenital aortic stenosis  #Allergies    Consult:  Labs:  Admission labs reviewed.  UDS negative.  Slightly elevated WBC noted, patient afebrile.  Continue to monitor for sxs of infection or fever.      Dispo: unknown pending medication management and clinical stabilization    -------------------------------------------------------  Umair Mckenna MD  Psychiatry PGY-2        Attestation: Attestation:  I, Jacquelyn Lomas MD, have personally performed an examination of this patient on October 12, 2019 and I have reviewed the resident's documentation.  I have discussed that patient with staff and the on-call resident.  I have edited the note to reflect all relevant changes. I agree with resident findings and plan in this resident H&P.  I have reviewed all vitals and laboratory findings.  Jacquelyn Lomas MD

## 2019-10-12 NOTE — PROGRESS NOTES
Patient was in his room resting in bed much of the day.  Patient appeared very reluctant to come out in the milieu for breakfast.  Patient was peering out his doorway up and down the hallway.  Writer encouraged patient to come into milieu for breakfast.  Patient reluctantly came out and ate.  Patient appeared very worried and perhaps paranoid.  Patient denied any mental health issues to writer except for depression.  Patient denies SI/SIB and also denies any hallucinations or paranoia.  Patient appeared less reluctant to come in milieu for lunch.     10/12/19 1400   Behavioral Health   Hallucinations denies / not responding to hallucinations   Thinking poor concentration   Orientation person: oriented;place: oriented;date: oriented;time: oriented   Memory confabulation   Insight poor   Judgement impaired   Affect blunted, flat   Mood depressed   Physical Appearance/Attire disheveled   Hygiene neglected grooming - unclean body, hair, teeth   Suicidality other (see comments)  (denies)   1. Wish to be Dead (Past Month) No   2. Non-Specific Active Suicidal Thoughts (Past Month) No   Self Injury other (see comment)  (denies)   Activity isolative;withdrawn   Speech coherent   Medication Sensitivity no stated side effects   Psychomotor / Gait balanced;steady   Psycho Education   Type of Intervention 1:1 intervention   Response participates with encouragement   Hours 0.5   Treatment Detail check-in

## 2019-10-12 NOTE — PROGRESS NOTES
10/12/19 0019   Patient Belongings   Did you bring any home meds/supplements to the hospital?  No   Patient Belongings locker;sent to security per site process   Patient Belongings Put in Hospital Secure Location (Security or Locker, etc.) cell phone/electronics;clothing;other (see comments)   Belongings Search Yes   Clothing Search Yes     Jeans-1  Belt -1  T-shirt -1  Shoes -1 pair  Socks -1 pair  Phone-1   Phone -1  Jacket-1  Pocket knife-1  Licorice- 1 bag  Reeses pieces-2 bags    A               Admission:  I am responsible for any personal items that are not sent to the safe or pharmacy.  [facility] is not responsible for loss, theft or damage of any property in my possession.    Signature:  _________________________________ Date: _______  Time: _____                                              Staff Signature:  ____________________________ Date: ________  Time: _____      2nd Staff person, if patient is unable/unwilling to sign:    Signature: ________________________________ Date: ________  Time: _____     Discharge:  [facility] has returned all of my personal belongings:    Signature: _________________________________ Date: ________  Time: _____                                          Staff Signature:  ____________________________ Date: ________  Time: _____

## 2019-10-12 NOTE — PROGRESS NOTES
"Interviewed patient in his room. He was standing in his room and did not know where he is, or what he could do. Or what is the reason he is here. He appeared scared and did not know what to do. Patient answered questions mostly with \"yes\" and \"no\".  He denied suicidal attempt or thoughts. Said he has never been to a hospital like this before. Patient declined flu vaccine and pavel visit at this time. Denied seizure history. He denied pain or all other physical issues.  Patient denied having any physical need, like needing food or fluids. He was encouraged to choose a bed which he did. Patient was encouraged to go to bed and he complied with it. He said he likes the light off and the door open.     Patient said that he takes only \"allergy medication\".    Zyprexa 10 mg PO PRN was offered and accepted by patient and is still remaining in bed.     Admission is not completed at this time.   "

## 2019-10-12 NOTE — PROGRESS NOTES
"CLINICAL NUTRITION SERVICES - ASSESSMENT NOTE     Nutrition Prescription    RECOMMENDATIONS FOR MDs/PROVIDERS TO ORDER:  None at this time    Malnutrition Status:    Patient does not meet two of the above criteria necessary for diagnosing malnutrition    Recommendations already ordered by Registered Dietitian (RD):  None at this time    Future/Additional Recommendations:  Monitor PO and wt trends      REASON FOR ASSESSMENT  Judah Edwardsion is a/an 34 year old male assessed by the dietitian for Admission Nutrition Risk Screen for reduced oral intake over the last month    NUTRITION HISTORY  Judah reports that he has a variable appetite and usually eats 2 meals a day.     CURRENT NUTRITION ORDERS  Diet: Regular  Intake/Tolerance: Judah was able to eat 100% of his breakfast this morning.     LABS  Labs reviewed    MEDICATIONS  Medications reviewed    ANTHROPOMETRICS  Height: 5' 6\"   Most Recent Weight: 97 kg    IBW: 64.5 kg (150%)   BMI: Obesity Grade I BMI 30-34.9  Weight History: Wt stable over the past ~4 months.   Wt Readings from Last 10 Encounters:   10/11/19 97 kg (213 lb 13.5 oz)   05/20/19 96.7 kg (213 lb 3.2 oz)   08/13/18 99 kg (218 lb 4.8 oz)   05/04/18 95.4 kg (210 lb 6.4 oz)   03/19/18 94.5 kg (208 lb 4.8 oz)   03/15/18 95.3 kg (210 lb)   03/07/18 95.5 kg (210 lb 9.6 oz)   11/17/16 88.9 kg (196 lb)   10/28/16 89.8 kg (198 lb)   08/03/15 88.9 kg (196 lb)     Dosing Weight: 73 kg (adjusted based on most recent wt and IBW)     ASSESSED NUTRITION NEEDS  Estimated Energy Needs: 7946-2783 kcals/day (20 - 25 kcals/kg)  Justification: Maintenance and Obese  Estimated Protein Needs: 73-88 grams protein/day (1 - 1.2 grams of pro/kg)  Justification: Maintenance and Obesity guidelines  Estimated Fluid Needs: 6014-1057 mL/day (25 - 30 mL/kg)   Justification: Maintenance    PHYSICAL FINDINGS  See malnutrition section below.    MALNUTRITION  % Intake: Decreased intake does not meet criteria  % Weight Loss: None " noted  Subcutaneous Fat Loss: None observed  Muscle Loss: None observed  Fluid Accumulation/Edema: None noted  Malnutrition Diagnosis: Patient does not meet two of the above criteria necessary for diagnosing malnutrition    NUTRITION DIAGNOSIS  Predicted inadequate nutrient intake (calories/protein) related to variable appetite but currently eating well as evidenced by potential for decline in PO intake.       INTERVENTIONS  Implementation  Nutrition Education: Discussed current PO/intake and role of RD. Encouraged eating meals TID.      Goals  Patient to consume % of nutritionally adequate meal trays TID, or the equivalent with supplements/snacks.     Monitoring/Evaluation  Progress toward goals will be monitored and evaluated per protocol.    Ankita Berrios RD, LD  Unit pager: [phone]  Weekend pager: [phone]

## 2019-10-12 NOTE — PROGRESS NOTES
Py admitted at 2245 from [facility] , Pt calm , cooperative with search, he denies si , Pt signed in voluntary

## 2019-10-12 NOTE — PROGRESS NOTES
Pt was awake at beginning of the shift. Was in room starring at the door way, expressionless. Pt read through admission papers many times. Remained in room but when staffed talked  to pt he appeared paranoid, constantly watching the door way. Pt was offered Zyprexa and pt agreed to take it. Pt remained awake for sometime after medication was given, but eventually pt was able to fall asleep and remained the rest of the night.
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
## 2019-10-13 PROCEDURE — 25000132 ZZH RX MED GY IP 250 OP 250 PS 637: Performed by: STUDENT IN AN ORGANIZED HEALTH CARE EDUCATION/TRAINING PROGRAM

## 2019-10-13 PROCEDURE — 12400001 ZZH R&B MH UMMC

## 2019-10-13 PROCEDURE — 25000132 ZZH RX MED GY IP 250 OP 250 PS 637: Performed by: PSYCHIATRY & NEUROLOGY

## 2019-10-13 RX ORDER — PSEUDOEPHEDRINE HCL 120 MG/1
120 TABLET, FILM COATED, EXTENDED RELEASE ORAL
Status: COMPLETED | OUTPATIENT
Start: 2019-10-13 | End: 2019-10-13

## 2019-10-13 RX ADMIN — FEXOFENADINE HYDROCHLORIDE 30 MG: 30 TABLET, ORALLY DISINTEGRATING ORAL at 16:36

## 2019-10-13 RX ADMIN — PSEUDOEPHEDRINE HYDROCHLORIDE 120 MG: 120 TABLET, FILM COATED ORAL at 19:57

## 2019-10-13 RX ADMIN — TRAZODONE HYDROCHLORIDE 50 MG: 50 TABLET ORAL at 20:49

## 2019-10-13 RX ADMIN — ARIPIPRAZOLE 2 MG: 2 TABLET ORAL at 20:48

## 2019-10-13 RX ADMIN — ACETAMINOPHEN 650 MG: 325 TABLET, FILM COATED ORAL at 04:39

## 2019-10-13 ASSESSMENT — ACTIVITIES OF DAILY LIVING (ADL)
HYGIENE/GROOMING: INDEPENDENT
DRESS: SCRUBS (BEHAVIORAL HEALTH);INDEPENDENT
ORAL_HYGIENE: INDEPENDENT
ORAL_HYGIENE: INDEPENDENT
DRESS: INDEPENDENT
HYGIENE/GROOMING: SHOWER;INDEPENDENT

## 2019-10-13 NOTE — PLAN OF CARE
"\"I'm better. I feel more relaxed and comfortable\". Depression and anxiety have improved. Intermittent auditory hallucinations have improved. States voices are both good and bad. Thoughts are more clear and is able to focus and concentrate better. Flat affect. Took a shower this morning. Has spent most the day resting in bed.   "

## 2019-10-13 NOTE — PROGRESS NOTES
Pt woke up early but remained in room. After sometime awake pt walked to desk area asking for pain medication for back pain. Pt was given prn tylenol. Pt was also requesting a snack and was given Koko's pieces candy that was in his locker.

## 2019-10-13 NOTE — PROGRESS NOTES
Pt was out in the milieu most of the evening watching TV and interacting with peers. Pt had a blunted affect and his mood was calm. Pt said he enjoyed seeing his best friends this evening and getting to play games with him. Pt said he is thankful for the strong support they give him. Pt denies all SI, SIB, HI, and other mental health symptoms.        10/12/19 2148   Behavioral Health   Hallucinations denies / not responding to hallucinations   Thinking distractable   Orientation person: oriented;place: oriented;date: oriented;time: oriented   Memory baseline memory   Insight poor   Judgement impaired   Eye Contact at examiner   Affect blunted, flat   Mood depressed;mood is calm   Physical Appearance/Attire untidy   Hygiene neglected grooming - unclean body, hair, teeth   1. Wish to be Dead (Past Month) No   2. Non-Specific Active Suicidal Thoughts (Past Month) No   Psycho Education   Type of Intervention 1:1 intervention   Response participates with encouragement   Hours 0.5   Treatment Detail check in   Activities of Daily Living   Hygiene/Grooming independent   Oral Hygiene independent   Dress independent   Laundry unable to complete   Room Organization independent

## 2019-10-14 PROCEDURE — 25000132 ZZH RX MED GY IP 250 OP 250 PS 637: Performed by: STUDENT IN AN ORGANIZED HEALTH CARE EDUCATION/TRAINING PROGRAM

## 2019-10-14 PROCEDURE — 25000132 ZZH RX MED GY IP 250 OP 250 PS 637: Performed by: PSYCHIATRY & NEUROLOGY

## 2019-10-14 PROCEDURE — 12400001 ZZH R&B MH UMMC

## 2019-10-14 PROCEDURE — 99232 SBSQ HOSP IP/OBS MODERATE 35: CPT | Mod: GC | Performed by: PSYCHIATRY & NEUROLOGY

## 2019-10-14 RX ORDER — ARIPIPRAZOLE 5 MG/1
5 TABLET ORAL
Status: DISCONTINUED | OUTPATIENT
Start: 2019-10-14 | End: 2019-10-14

## 2019-10-14 RX ORDER — OLANZAPINE 10 MG/2ML
10 INJECTION, POWDER, FOR SOLUTION INTRAMUSCULAR
Status: DISCONTINUED | OUTPATIENT
Start: 2019-10-14 | End: 2019-10-14

## 2019-10-14 RX ORDER — FEXOFENADINE HCL AND PSEUDOEPHEDRINE HCL 180; 240 MG/1; MG/1
1 TABLET, EXTENDED RELEASE ORAL DAILY
Status: DISCONTINUED | OUTPATIENT
Start: 2019-10-14 | End: 2019-10-21 | Stop reason: HOSPADM

## 2019-10-14 RX ORDER — OLANZAPINE 10 MG/2ML
10 INJECTION, POWDER, FOR SOLUTION INTRAMUSCULAR
Status: DISCONTINUED | OUTPATIENT
Start: 2019-10-14 | End: 2019-10-21 | Stop reason: HOSPADM

## 2019-10-14 RX ADMIN — FEXOFENADINE HYDROCHLORIDE AND PSEUDOEPHEDRINE HYDROCHLORIDE 1 TABLET: 180; 240 TABLET, FILM COATED, EXTENDED RELEASE ORAL at 09:42

## 2019-10-14 RX ADMIN — ARIPIPRAZOLE 2 MG: 2 TABLET ORAL at 21:39

## 2019-10-14 RX ADMIN — TRAZODONE HYDROCHLORIDE 50 MG: 50 TABLET ORAL at 21:39

## 2019-10-14 RX ADMIN — ACETAMINOPHEN 650 MG: 325 TABLET, FILM COATED ORAL at 07:02

## 2019-10-14 ASSESSMENT — ACTIVITIES OF DAILY LIVING (ADL)
ORAL_HYGIENE: INDEPENDENT
DRESS: SCRUBS (BEHAVIORAL HEALTH)
HYGIENE/GROOMING: INDEPENDENT

## 2019-10-14 NOTE — PROGRESS NOTES
Pt has been visible in the milieu. Pt appears to be socially withdrawn and keeps to himself. Pt had breakfast and lunch. He did not attend any groups.        10/14/19 1400   Behavioral Health   Hallucinations auditory   Thinking intact   Orientation person: oriented;place: oriented   Memory baseline memory   Insight admits / accepts   Judgement impaired   Eye Contact at examiner   Affect blunted, flat   Mood depressed;anxious   Physical Appearance/Attire untidy   Hygiene neglected grooming - unclean body, hair, teeth   Activities of Daily Living   Hygiene/Grooming independent   Oral Hygiene independent   Dress scrubs (behavioral health)   Room Organization independent

## 2019-10-14 NOTE — PLAN OF CARE
BEHAVIORAL TEAM DISCUSSION    Participants:   Vania Pabon MD  Attending Psychiatrist   Elenita Padron MD PGY1  Anjum Haddad MD PGY2  Sylwia Delacruz, MSW, [facility] Clinical Treatment Coordinator  Alanna Carcamo, LEWIS Copeland, MS,OTR/L  Anthony Garcia MS4  Progress: initial team meeting  Anticipated length of stay: assessment ongoing.   Continued Stay Criteria/Rationale: patient is admitted for assessment and treatment of worsening depressive symptoms and symptoms of psychosis.   Medical/Physical:   Medical diagnoses:    #hx of congenital aortic stenosis  #Allergies  - Fexofenadine-Pseudoephedrine (ALLEGRA-D 24) 180-240mg PO q1d for allergies     Precautions:   Behavioral Orders   Procedures    Code 1 - Restrict to Unit    Elopement precautions    Routine Programming     As clinically indicated    Self Injury Precaution    Status 15     Every 15 minutes.    Suicide precautions     Patients on Suicide Precautions should have a Combination Diet ordered that includes a Diet selection(s) AND a Behavioral Tray selection for Safe Tray - with utensils, or Safe Tray - NO utensils       Plan: Psychiatric assessment. Medication management. Therapeutic Milieu. Individual care planning and after care planning. Patient to participate in unit groups and activities. Individual and group support on unit.   Rationale for change in precautions or plan: per initial assessment.

## 2019-10-14 NOTE — PROGRESS NOTES
"Pt was observed present in the milieu throughout the evening to watch TV briefly and eat meals. While in the milieu pt was not observed to socialize with peers or staff. During check-in with writer pt reported feeling \"better\". When asked to explain what that meant to them pt explained that they are able to think clearer and their anxiety and depression have decreased. Pt rates both anxiety and depression a 3 on a scale of 1-10. They were unable to explain what brings on their feelings of anxiety stating \"It feels like it is different every time\". Pt also reports feeling more tired than usual but states \"I think I just need to catch up on sleep with everything that was going on\". Pt was later visited by two of their friends and appeared cheerful during the visit. Pt denies SI and SIB.     10/13/19 1857   Behavioral Health   Hallucinations auditory   Thinking intact   Orientation person: oriented;place: oriented   Memory baseline memory   Insight admits / accepts   Judgement impaired   Eye Contact at examiner   Affect blunted, flat   Mood mood is calm;depressed;anxious   Physical Appearance/Attire untidy   Hygiene neglected grooming - unclean body, hair, teeth   Suicidality   (Pt denies)   1. Wish to be Dead (Past Month) No   2. Non-Specific Active Suicidal Thoughts (Past Month) No   Self Injury   (None stated or observed)   Elopement   (None stated or observed)   Activity withdrawn   Speech clear;coherent   Medication Sensitivity no stated side effects;no observed side effects   Psychomotor / Gait steady;balanced   Psycho Education   Type of Intervention 1:1 intervention   Response participates with encouragement   Hours 0.5   Treatment Detail Check-in   Activities of Daily Living   Hygiene/Grooming independent   Oral Hygiene independent   Dress independent   Room Organization independent     "

## 2019-10-15 ENCOUNTER — APPOINTMENT (OUTPATIENT)
Dept: MRI IMAGING | Facility: CLINIC | Age: 34
DRG: 885 | End: 2019-10-15
Attending: PSYCHIATRY & NEUROLOGY
Payer: COMMERCIAL

## 2019-10-15 PROCEDURE — 25000132 ZZH RX MED GY IP 250 OP 250 PS 637: Performed by: STUDENT IN AN ORGANIZED HEALTH CARE EDUCATION/TRAINING PROGRAM

## 2019-10-15 PROCEDURE — G0177 OPPS/PHP; TRAIN & EDUC SERV: HCPCS

## 2019-10-15 PROCEDURE — 12400001 ZZH R&B MH UMMC

## 2019-10-15 PROCEDURE — 93010 ELECTROCARDIOGRAM REPORT: CPT | Performed by: INTERNAL MEDICINE

## 2019-10-15 PROCEDURE — 70551 MRI BRAIN STEM W/O DYE: CPT

## 2019-10-15 PROCEDURE — 93005 ELECTROCARDIOGRAM TRACING: CPT

## 2019-10-15 PROCEDURE — 99232 SBSQ HOSP IP/OBS MODERATE 35: CPT | Mod: GC | Performed by: PSYCHIATRY & NEUROLOGY

## 2019-10-15 RX ORDER — ARIPIPRAZOLE 5 MG/1
5 TABLET ORAL AT BEDTIME
Status: DISCONTINUED | OUTPATIENT
Start: 2019-10-15 | End: 2019-10-21 | Stop reason: HOSPADM

## 2019-10-15 RX ADMIN — FEXOFENADINE HYDROCHLORIDE AND PSEUDOEPHEDRINE HYDROCHLORIDE 1 TABLET: 180; 240 TABLET, FILM COATED, EXTENDED RELEASE ORAL at 09:09

## 2019-10-15 RX ADMIN — TRAZODONE HYDROCHLORIDE 50 MG: 50 TABLET ORAL at 21:02

## 2019-10-15 RX ADMIN — ARIPIPRAZOLE 5 MG: 5 TABLET ORAL at 21:02

## 2019-10-15 ASSESSMENT — ACTIVITIES OF DAILY LIVING (ADL)
HYGIENE/GROOMING: INDEPENDENT
LAUNDRY: UNABLE TO COMPLETE
ORAL_HYGIENE: INDEPENDENT
DRESS: INDEPENDENT

## 2019-10-15 NOTE — PROGRESS NOTES
"Pt initially refused his meds , he was lying in bed , sat up on edge of bed and held med cup foe a long while and states \" I dont want to take them . \" he could not be encouraged  To take them , He states he is hearing voices but could not tell what they are saying . He denies feeling suicidal . Pt about 20 minutes later came up to the med window and requested his meds and took them . He has been calm , has a flat affect ,   "

## 2019-10-15 NOTE — PROGRESS NOTES
10/15/19 1423   Behavioral Health   Hallucinations auditory   Thinking distractable   Orientation place: oriented   Insight admits / accepts   Judgement impaired   Eye Contact at examiner   Affect blunted, flat   Mood mood is calm   Physical Appearance/Attire attire appropriate to age and situation   Hygiene other (see comment)  (did not shower this shift )   Suicidality other (see comments)  (denies)   1. Wish to be Dead (Past Month) No   2. Non-Specific Active Suicidal Thoughts (Past Month) No   Self Injury other (see comment)  (None observed or reported )   Elopement   (None observed or reported )   Activity other (see comment)  (visible )   Speech clear;coherent   Medication Sensitivity no stated side effects     Pt was observed to be awake and visible on the unit. Affect is neutral. Calm and controlled behavior. Pt was observed to attend groups, mostly keep to himself, and listened to music. Pt stated his goal is to read his book. He reported he still hears voices, and is using music and books to help with coping. Denies thoughts to hurt himself or others. Contracts for safety.

## 2019-10-15 NOTE — PROGRESS NOTES
Patient participated in afternoon group that focused on various mental health diagnoses, we watched a short video and had a discussion. Patient did not participate in discussion group following video.     Psycho Education   Type of Intervention structured groups   Response Participates in video watching   Hours 1   Treatment Detail Various mental illness - symptoms, personal experiences and treatment options     Sulma Gonzales, LPCC, LADC

## 2019-10-15 NOTE — PROGRESS NOTES
"    ----------------------------------------------------------------------------------------------------------  [facility], [place]   Psychiatric Progress Note  Hospital Day #4     Interim History:   The patient's care was discussed with the treatment team and chart notes were reviewed.    Sleep: 6.75hrs last night  Scheduled Medications: Took all offered  PRNs: Trazdone 50mg PRN for sleep and Tylenol 650mg PRN for back pain    Staff Report: Pt initially refused his meds , he was lying in bed , sat up on edge of bed and held med cup for a long while and states \" I dont want to take them . \" he could not be encouraged  To take them , He states he is hearing voices but could not tell what they are saying . He denies feeling suicidal . Pt about 20 minutes later came up to the med window and requested his meds and took them . He has been calm , has a flat affect ,     Patient Interview: The patient was interviewed today in the conference room on unit 22. The patient was similar in presentation to yesterday, though he did appear somewhat more comfortable with the team. He endorsed feeling well today, with adequate sleep last night. He did endorse continued AH in the form of \"voices\" that cause him some distress, depending on his context.  The voices increased in frequency last night but remained equally intense, with indistinguishable whispering per the patient. Will increased Abilify to 5mg tonight to decrease frequency of voices per patient request. The patient was asked about his sleep prior to hospitalization and described sleeping 4-5 hours a night at most, having trouble with sleep onset. The patient described Trazodone as of particular benefit here in the hospital in improving issues with sleep onset. Patient continues to report unease at the prospect of moving back with his parents and reports he has reached out to some friends about possibly living with them and all have declined. " "Patient is denying SI/HI/VH today on interview.     The risks, benefits, alternatives and side effects of any medication changes have been discussed and are understood by the patient and other caregivers.    Review of systems:     ROS was negative unless noted above.          Allergies:     Allergies   Allergen Reactions     Augmentin Nausea     Clindamycin Nausea and Vomiting            Psychiatric Examination:   /89 (BP Location: Left arm)   Pulse 125   Temp 98.1  F (36.7  C) (Oral)   Resp 14   Wt 94.3 kg (207 lb 12.8 oz)   SpO2 98%   BMI 33.54 kg/m    Weight is 207 lbs 12.8 oz  Body mass index is 33.54 kg/m .    Appearance:  awake, alert, adequately groomed, dressed in hospital scrubs, appeared as age stated, awake, alert, cooperative and no apparent distress  Attitude: somewhat  cooperative and guarded  Eye Contact:  fair  Mood:  anxious and \"better\"  Affect:  mood congruent  Speech:  clear, coherent and normal prosody  Psychomotor Behavior:  no evidence of tardive dyskinesia, dystonia, or tics and fidgeting  Thought Process:  logical, linear and goal oriented  Associations:  no loose associations  Thought Content:  no evidence of suicidal ideation or homicidal ideation, auditory hallucinations present and no visual hallucinations present  Insight:  limited  Judgment:  fair  Oriented to:  time, person, and place  Attention Span and Concentration:  fair  Recent and Remote Memory:  intact  Language: English with normal syntax  Fund of Knowledge: Known deficit  Muscle Strength and Tone: normal  Gait and Station: Normal         Labs:     Results for MARLEN LUO (MRN [id number]) as of 10/14/2019 12:52   Ref. Range 10/11/2019 18:01 10/11/2019 19:56 10/11/2019 20:00 10/12/2019 08:20 10/12/2019 16:30   Sodium Latest Ref Range: 133 - 144 mmol/L 139       Potassium Latest Ref Range: 3.4 - 5.3 mmol/L 4.3       Chloride Latest Ref Range: 94 - 109 mmol/L 105       Carbon Dioxide Latest Ref Range: 20 - 32 " mmol/L 33 (H)       Urea Nitrogen Latest Ref Range: 7 - 30 mg/dL 10       Creatinine Latest Ref Range: 0.66 - 1.25 mg/dL 0.88       GFR Estimate Latest Ref Range: >60 mL/min/1.73_m2 >90       GFR Estimate If Black Latest Ref Range: >60 mL/min/1.73_m2 >90       Calcium Latest Ref Range: 8.5 - 10.1 mg/dL 9.0       Anion Gap Latest Ref Range: 3 - 14 mmol/L 1 (L)       Albumin Latest Ref Range: 3.4 - 5.0 g/dL 3.9   3.9    Protein Total Latest Ref Range: 6.8 - 8.8 g/dL 7.7   7.8    Bilirubin Total Latest Ref Range: 0.2 - 1.3 mg/dL 0.4   0.8    Alkaline Phosphatase Latest Ref Range: 40 - 150 U/L 106   108    ALT Latest Ref Range: 0 - 70 U/L 53   48    AST Latest Ref Range: 0 - 45 U/L 27   20    Bilirubin Direct Latest Ref Range: 0.0 - 0.2 mg/dL    0.2    Cholesterol Latest Ref Range: <200 mg/dL    136    Folate Latest Ref Range: >5.4 ng/mL    12.2    HDL Cholesterol Latest Ref Range: >39 mg/dL    37 (L)    LDL Cholesterol Calculated Latest Ref Range: <100 mg/dL    77    Non HDL Cholesterol Latest Ref Range: <130 mg/dL    99    Triglycerides Latest Ref Range: <150 mg/dL    110    TSH Latest Ref Range: 0.40 - 4.00 mU/L 2.01       Vitamin B12 Latest Ref Range: 193 - 986 pg/mL    424    Glucose Latest Ref Range: 70 - 99 mg/dL 105 (H)       WBC Latest Ref Range: 4.0 - 11.0 10e9/L 12.6 (H)       Hemoglobin Latest Ref Range: 13.3 - 17.7 g/dL 16.1       Hematocrit Latest Ref Range: 40.0 - 53.0 % 50.8   51.9    Platelet Count Latest Ref Range: 150 - 450 10e9/L 403       RBC Count Latest Ref Range: 4.4 - 5.9 10e12/L 5.76       MCV Latest Ref Range: 78 - 100 fl 88       MCH Latest Ref Range: 26.5 - 33.0 pg 28.0       MCHC Latest Ref Range: 31.5 - 36.5 g/dL 31.7       RDW Latest Ref Range: 10.0 - 15.0 % 13.6       Diff Method Unknown Automated Method       % Neutrophils Latest Units: % 68.0       % Lymphocytes Latest Units: % 20.2       % Monocytes Latest Units: % 8.6       % Eosinophils Latest Units: % 1.6       % Basophils Latest  Units: % 1.1       % Immature Granulocytes Latest Units: % 0.5       Nucleated RBCs Latest Ref Range: 0 /100 0       Absolute Neutrophil Latest Ref Range: 1.6 - 8.3 10e9/L 8.5 (H)       Absolute Lymphocytes Latest Ref Range: 0.8 - 5.3 10e9/L 2.5       Absolute Monocytes Latest Ref Range: 0.0 - 1.3 10e9/L 1.1       Absolute Eosinophils Latest Ref Range: 0.0 - 0.7 10e9/L 0.2       Absolute Basophils Latest Ref Range: 0.0 - 0.2 10e9/L 0.1       Abs Immature Granulocytes Latest Ref Range: 0 - 0.4 10e9/L 0.1       Absolute Nucleated RBC Unknown 0.0       Color Urine Unknown     Yellow   Appearance Urine Unknown     Clear   Glucose Urine Latest Ref Range: NEG^Negative mg/dL     Negative   Bilirubin Urine Latest Ref Range: NEG^Negative      Negative   Ketones Urine Latest Ref Range: NEG^Negative mg/dL     Negative   Specific Gravity Urine Latest Ref Range: 1.003 - 1.035      1.014   pH Urine Latest Ref Range: 5.0 - 7.0 pH     7.0   Protein Albumin Urine Latest Ref Range: NEG^Negative mg/dL     Negative   Urobilinogen mg/dL Latest Ref Range: 0.0 - 2.0 mg/dL     Normal   Nitrite Urine Latest Ref Range: NEG^Negative      Negative   Blood Urine Latest Ref Range: NEG^Negative      Negative   Leukocyte Esterase Urine Latest Ref Range: NEG^Negative      Negative   Source Unknown     Midstream Urine   WBC Urine Latest Ref Range: 0 - 5 /HPF     <1   RBC Urine Latest Ref Range: 0 - 2 /HPF     <1   Mucous Urine Latest Ref Range: NEG^Negative /LPF     Present (A)   Acetaminophen Level Latest Units: mg/L <2       Amphetamine Qual Urine Latest Ref Range: NEG^Negative    Negative     Cocaine Qual Urine Latest Ref Range: NEG^Negative    Negative     Opiates Qualitative Urine Latest Ref Range: NEG^Negative    Negative     Cannabinoids Qual Urine Latest Ref Range: NEG^Negative    Negative     Barbiturates Qual Urine Latest Ref Range: NEG^Negative    Negative     Pcp Qual Urine Latest Ref Range: NEG^Negative    Negative     Benzodiazepine  Qual Urine Latest Ref Range: NEG^Negative    Negative     CT HEAD W/O CONTRAST Unknown  Rpt           Assessment    Diagnostic Impression:   This patient is a 34 year old male with history of depression who presented to [facility] ED  on 10/12/2019 due to increasing AH.  This is in the context of chronic depression and intermittent SI  MSE was notable for prolonged pausing and hesitancy to discuss his psychotic symptoms.  Possible thought blocking versus on-going PI and/or distress related to psychotic symptoms.      Reason for inpatient hospitalization is to address psychotic and long-standing depressed mood symptoms.  At this time, we will initiate low dose anti-psychotic Abilify 2 mg at bedtime.  Risks/Benefits were discussed with the patient and consent was signed.  The patient is likely to benefit from anti-depressant as well given his report of depression prior to the psychotic symptoms onset.      Hospital course: Judah Candelaria was admitted to station 22 as a voluntary patient. On admission the patient was started on 2mg Abilify PO at bedtime and 50mg Trazadone PO PRN at bedtime for psychotic symptoms and sleep, respectively.  Pt reported improvement in mental state and did not endorse any side effects from the medication. On hospital day three (10/14/2019) the patients Olanzapine 10mg PO PRN and Hydroxizine 25mg PRN were discontinued to limit polypharmacy. On hospital day four (10/15/2019) the patient's scheduled Abilify was increased to 5mg PO at bedtime to improve frequency of AH, which are causing the patient distress at times during the day. SW to investigate opportunities for services and housing as the patient is currently not wanting to return to his previous living situation. Will order labs to continue first-episode-psychosis work-up started in the ED.     Discontinued Medications (& Rationale):  - Discontinue Olanzapine 10mg Q2H PRN and start Abilify 0.5mg PO PRN to limit polypharmacy  - Discontinue  25mg Hydroxizine Q4H PRN for anxiety to limit polypharmacy    Medical course: Patients mother called to report the patient takes Allegra-D for allergies. Patient switched over to D-formulation.     Plan   - Monitor for side effects and improvement on increased 5mg PO Abilify at bedtime (10/15/19)    Principal Diagnosis:   # Major Depressive Disorder, Severe, with psychotic features    Psychotropic Medications:  Modify:  - Increase Abilify to 5mg PO at bedtime for AH (10/15/19)     Continue:  -po Abilify 1mg  Q2h /prn or  IM Olanzapine 10mg Q2H PRN aggression/agitation due to psychosis  - Trazadone 50mg PRN at bedtime for insomnia     - Patient will be treated in therapeutic milieu with appropriate individual and group therapies.     Medical diagnoses:       #hx of congenital aortic stenosis  - EKG shows qtc of 449 with no change from last EKG (10/15/19)     #Allergies  - Fexofenadine-Pseudoephedrine (ALLEGRA-D 24) 180-240mg PO q1d for allergies      Consult: None  Labs:  Admission labs reviewed.  UDS negative.  Slightly elevated WBC noted, patient afebrile.  Continue to monitor for sxs of infection or fever. Will continue with first-episode-psychosis workup by ordering MRI, HIV, Ceruloplasmin, LEENA, ESR, Lyme Titer, and Treponemal Ab.     Legal Status: Voluntary    Safety Assessment:   Behavioral Orders   Procedures     Code 1 - Restrict to Unit     Elopement precautions     Routine Programming     As clinically indicated     Self Injury Precaution     Status 15     Every 15 minutes.     Suicide precautions     Patients on Suicide Precautions should have a Combination Diet ordered that includes a Diet selection(s) AND a Behavioral Tray selection for Safe Tray - with utensils, or Safe Tray - NO utensils       Dispo: unknown pending medication management and clinical stabilization    Anthony Garcia on 10/15/2019 at 12:56 PM  I was present with the medical student who participated in the service and in the  documentation of the note. I have verified the history and personally performed the physical exam and medical decision making. I agree with the assessment and plan of care as documented in the note.   MAXIM Manzo  PGY 1    Attestation:  This patient has been seen and evaluated by me, Vania Pabon.  I have discussed this patient with the psychiatry resident and I agree with the findings and plan in this note.    I have reviewed today's vital signs, medications, labs and imaging.   Vania Pabon MD.          .

## 2019-10-15 NOTE — PLAN OF CARE
Judah   attended 1 of 3 OT groups today. Quiet and tense. No verbal participation in the group; minimal eye contact. Was given a written OT self-assessment form; has not completed it.

## 2019-10-16 LAB
B BURGDOR IGG+IGM SER QL: 0.19 (ref 0–0.89)
ERYTHROCYTE [SEDIMENTATION RATE] IN BLOOD BY WESTERGREN METHOD: 3 MM/H (ref 0–15)
FOLATE SERPL-MCNC: 12.6 NG/ML
HIV 1+2 AB+HIV1 P24 AG SERPL QL IA: NONREACTIVE
INTERPRETATION ECG - MUSE: NORMAL
T PALLIDUM AB SER QL: NONREACTIVE
VIT B12 SERPL-MCNC: 428 PG/ML (ref 193–986)

## 2019-10-16 PROCEDURE — 25000132 ZZH RX MED GY IP 250 OP 250 PS 637: Performed by: STUDENT IN AN ORGANIZED HEALTH CARE EDUCATION/TRAINING PROGRAM

## 2019-10-16 PROCEDURE — 82390 ASSAY OF CERULOPLASMIN: CPT | Performed by: STUDENT IN AN ORGANIZED HEALTH CARE EDUCATION/TRAINING PROGRAM

## 2019-10-16 PROCEDURE — 87389 HIV-1 AG W/HIV-1&-2 AB AG IA: CPT | Performed by: STUDENT IN AN ORGANIZED HEALTH CARE EDUCATION/TRAINING PROGRAM

## 2019-10-16 PROCEDURE — 86039 ANTINUCLEAR ANTIBODIES (ANA): CPT | Performed by: STUDENT IN AN ORGANIZED HEALTH CARE EDUCATION/TRAINING PROGRAM

## 2019-10-16 PROCEDURE — 36415 COLL VENOUS BLD VENIPUNCTURE: CPT | Performed by: STUDENT IN AN ORGANIZED HEALTH CARE EDUCATION/TRAINING PROGRAM

## 2019-10-16 PROCEDURE — 86780 TREPONEMA PALLIDUM: CPT | Performed by: STUDENT IN AN ORGANIZED HEALTH CARE EDUCATION/TRAINING PROGRAM

## 2019-10-16 PROCEDURE — 86038 ANTINUCLEAR ANTIBODIES: CPT | Performed by: STUDENT IN AN ORGANIZED HEALTH CARE EDUCATION/TRAINING PROGRAM

## 2019-10-16 PROCEDURE — 85652 RBC SED RATE AUTOMATED: CPT | Performed by: STUDENT IN AN ORGANIZED HEALTH CARE EDUCATION/TRAINING PROGRAM

## 2019-10-16 PROCEDURE — 12400001 ZZH R&B MH UMMC

## 2019-10-16 PROCEDURE — 86618 LYME DISEASE ANTIBODY: CPT | Performed by: STUDENT IN AN ORGANIZED HEALTH CARE EDUCATION/TRAINING PROGRAM

## 2019-10-16 PROCEDURE — 99232 SBSQ HOSP IP/OBS MODERATE 35: CPT | Mod: GC | Performed by: PSYCHIATRY & NEUROLOGY

## 2019-10-16 PROCEDURE — 82607 VITAMIN B-12: CPT | Performed by: STUDENT IN AN ORGANIZED HEALTH CARE EDUCATION/TRAINING PROGRAM

## 2019-10-16 PROCEDURE — 82746 ASSAY OF FOLIC ACID SERUM: CPT | Performed by: STUDENT IN AN ORGANIZED HEALTH CARE EDUCATION/TRAINING PROGRAM

## 2019-10-16 RX ADMIN — ARIPIPRAZOLE 5 MG: 5 TABLET ORAL at 21:16

## 2019-10-16 RX ADMIN — FEXOFENADINE HYDROCHLORIDE AND PSEUDOEPHEDRINE HYDROCHLORIDE 1 TABLET: 180; 240 TABLET, FILM COATED, EXTENDED RELEASE ORAL at 10:23

## 2019-10-16 RX ADMIN — TRAZODONE HYDROCHLORIDE 50 MG: 50 TABLET ORAL at 21:16

## 2019-10-16 RX ADMIN — TRAZODONE HYDROCHLORIDE 50 MG: 50 TABLET ORAL at 21:15

## 2019-10-16 ASSESSMENT — ACTIVITIES OF DAILY LIVING (ADL)
HYGIENE/GROOMING: INDEPENDENT
ORAL_HYGIENE: INDEPENDENT
HYGIENE/GROOMING: INDEPENDENT
DRESS: INDEPENDENT
DRESS: INDEPENDENT;SCRUBS (BEHAVIORAL HEALTH)
ORAL_HYGIENE: INDEPENDENT

## 2019-10-16 NOTE — PLAN OF CARE
Pt underwent mri testing this pm with wnl preliminary results,visible,flat and guarded this pm,med compliant,suicide/sib and elopement precautions in place,defer to team

## 2019-10-16 NOTE — PROGRESS NOTES
This writer met with patient individually to discuss planning and resources for post hospitalization / discharge follow up. patient currently does not have mental health providers and has been started on medications.     Judah had reported in meeting on Monday with team that he had previously seen a therapist/counselor in [place] for depression/mental health. Had not seen this person for some time now. This writer spoke with patient about individual therapy today , he says it was helpful for him in the past. This writer asked if he would prefer to see that provider again or search for a new therapist, he would like to schedul with someone new. Says he does not have a preference for provider gender.     Discussed where patient is currently planning to stay when he does leave the hospital , he is still wanting to stay somewhere other than his parents however says he is still thinking. He says he has spoke to a few friends about staying with them.   Clarified locations as patient's / parent's home is in [place], he says if he were to stay with a friend it would be nearer to [place].    Spoke with him about his schedule typically - he works 40 hours a week Monday - Thursday hours of 4:30am - 3:00pm.     In speaking about referral / provider search for psychiatry / medication management discussed with him that at times the first intake appointments are more limited as a longer time slot. That this writer will aim to find openings for a time that is after his work or on Friday his day off also asked if intake needed to be scheduled during a work time is his job flexible for allowing to attend a health appointment. He does believe so.     Also spoke with patient about psychiatry scheduling resource available through [facility] () explained to him that the [facility] has priority intake appointments available at a few mental health clinics in the area that he could be scheduled  through. He verbalized interest in utilizing this an option as well. This writer dicussed with him that I would follow up with him including for release of information to coordinate with [facility] for appointment.

## 2019-10-16 NOTE — PROGRESS NOTES
"    ----------------------------------------------------------------------------------------------------------  [facility]   Psychiatric Progress Note  Hospital Day #5     Interim History:   The patient's care was discussed with the treatment team and chart notes were reviewed.    Sleep: 7hrs last night  Scheduled Medications: Took all offered  PRNs: Trazdone 50mg PRN for sleep    Staff Report:   Pt underwent mri testing this pm with wnl preliminary results,visible,flat and guarded this pm,med compliant,suicide/sib and elopement precautions in place,defer to team    Patient Interview:   Patient was interviewed in the conference room on unit 22 this morning. The patient endorsed feeling \"mostly improved... only a couple times I've been depressed [in the hospital].\" The patient described his sleep as \"restless... I woke up a few times... it was hard to go back to sleep.\" In describing his voices he suggested \"it happened a couple times... causing some distress... still just whispers.\" The patient is endorsing music and reading as strategies for coping with the voices. The patient is denying SI/HI and VH today. SW is working on setting up counseling and other outpatient services for the patient. Patient was encouraged to reach out to friends and family for placement as he is unlikely to qualify for housing services.     Collateral Interview:   The patients mother (Jossy) was interviewed this afternoon in the conference room on unit 22. The mother was updated on the patients progress. The mother believes that the patients requests that his friends allow him to stay with them are straining those relationships and her preference is that the patient return home with her. Her hope is that through treatment the patient will gain greater insight into his illness and his paranoia about returning home will decrease. The patients mother expressed a desire for the patient to be discharged on " "sleep medications to aid in sleep. The patients mother was inquiring about potential discharge date and was informed that the patient would be discharged when he feels safe to leave and when there is a safe location chosen for discharge. The patient's mother would like to be updated about the patients progress on days that she cannot make it to the unit at [phone].     Neuro-psych Chart Review (Thais Rich Ph.D. LP on 11/05/2009):  \"Mr. Candelaria is currently functioning in the average range with regard to general intellectual ability... It is felt that Mr. Ramirez overall functioning is related to an interaction between mild cognitive inefficiencies and depression.\"     The risks, benefits, alternatives and side effects of any medication changes have been discussed and are understood by the patient and other caregivers.    Review of systems:     ROS was negative unless noted above.          Allergies:     Allergies   Allergen Reactions     Augmentin Nausea     Clindamycin Nausea and Vomiting            Psychiatric Examination:   /87 (BP Location: Left arm)   Pulse 105   Temp 98.1  F (36.7  C) (Oral)   Resp 16   Wt 94.3 kg (207 lb 12.8 oz)   SpO2 95%   BMI 33.54 kg/m    Weight is 207 lbs 12.8 oz  Body mass index is 33.54 kg/m .    Appearance:  awake, alert, adequately groomed, dressed in hospital scrubs, appeared as age stated, awake, alert, cooperative and no apparent distress  Attitude: somewhat  cooperative  Eye Contact:  fair  Mood:  anxious and \"better\"  Affect:  mood congruent  Speech:  clear, coherent and normal prosody  Psychomotor Behavior:  no evidence of tardive dyskinesia, dystonia, or tics and fidgeting  Thought Process:  logical, linear and goal oriented  Associations:  no loose associations  Thought Content:  no evidence of suicidal ideation or homicidal ideation, auditory hallucinations present and no visual hallucinations present  Insight:  limited  Judgment:  fair  Oriented to: "  time, person, and place  Attention Span and Concentration:  fair  Recent and Remote Memory:  intact  Language: English with normal syntax  Fund of Knowledge: Known deficit  Muscle Strength and Tone: normal  Gait and Station: Normal         Labs:     Recent Results (from the past 24 hour(s))   Erythrocyte sedimentation rate auto    Collection Time: 10/16/19  8:46 AM   Result Value Ref Range    Sed Rate 3 0 - 15 mm/h        Assessment    Diagnostic Impression:   This patient is a 34 year old male with history of depression who presented to [facility] ED  on 10/12/2019 due to increasing AH.  This is in the context of chronic depression and intermittent SI.  MSE was notable for prolonged pausing and hesitancy to discuss his psychotic symptoms.  Possible thought blocking versus on-going PI and/or distress related to psychotic symptoms.      Reason for inpatient hospitalization is to address psychotic and long-standing depressed mood symptoms.  At this time, we will initiate low dose anti-psychotic Abilify 2 mg at bedtime.  Risks/Benefits were discussed with the patient and consent was signed.  The patient is likely to benefit from anti-depressant as well given his report of depression prior to the psychotic symptoms onset.      Hospital course: Judah Candelaria was admitted to station 22 as a voluntary patient. On admission the patient was started on 2mg Abilify PO at bedtime and 50mg Trazadone PO PRN at bedtime for psychotic symptoms and sleep, respectively.  Pt reported improvement in mental state and did not endorse any side effects from the medication. On hospital day three (10/14/2019) the patients Olanzapine 10mg PO PRN and Hydroxizine 25mg PRN were discontinued to limit polypharmacy. On hospital day four (10/15/2019) the patient's scheduled Abilify was increased to 5mg PO at bedtime to improve frequency of AH, which are causing the patient distress at times during the day. SW to investigate opportunities for services  and housing as the patient is currently not wanting to return to his previous living situation. Will order labs to continue first-episode-psychosis work-up started in the ED. On hospital day five (10/16/2019) the patients MRI and Sed Rate for first-episode workup were both wnl.     Discontinued Medications (& Rationale):  - Discontinue Olanzapine 10mg Q2H PRN and start Abilify 0.5mg PO PRN to limit polypharmacy  - Discontinue 25mg Hydroxizine Q4H PRN for anxiety to limit polypharmacy    Medical course: Patients mother called to report the patient takes Allegra-D for allergies. Patient switched over to D-formulation.     Plan   - Monitor for side effects and improvement on increased 5mg PO Abilify at bedtime (10/15/19)  - Continue to seek placement and work on discharge plan for the patient    Principal Diagnosis:   # Major Depressive Disorder, Severe, with psychotic features    Psychotropic Medications:  Modify:  - Increase Abilify to 5mg PO at bedtime for AH (10/15/19)     Continue:  -po Abilify 1mg  Q2h /prn or  IM Olanzapine 10mg Q2H PRN aggression/agitation due to psychosis  - Trazadone 50mg PRN at bedtime for insomnia     - Patient will be treated in therapeutic milieu with appropriate individual and group therapies.     Medical diagnoses:       #hx of congenital aortic stenosis  - EKG shows qtc of 449 with no change from last EKG (10/15/19)     #Allergies  - Fexofenadine-Pseudoephedrine (ALLEGRA-D 24) 180-240mg PO q1d for allergies      Consult: None  Labs:  Admission labs reviewed.  UDS negative.  Slightly elevated WBC noted, patient afebrile.  Continue to monitor for sxs of infection or fever. Will continue with first-episode-psychosis workup by ordering MRI, HIV, Ceruloplasmin, LEENA, ESR, Lyme Titer, and Treponemal Ab.     Legal Status: Voluntary    Safety Assessment:   Behavioral Orders   Procedures     Code 1 - Restrict to Unit     Code 2     Elopement precautions     Routine Programming     As clinically  indicated     Self Injury Precaution     Status 15     Every 15 minutes.     Suicide precautions     Patients on Suicide Precautions should have a Combination Diet ordered that includes a Diet selection(s) AND a Behavioral Tray selection for Safe Tray - with utensils, or Safe Tray - NO utensils       Dispo: unknown pending medication management and clinical stabilization    Anthony Garcia on 10/16/2019 at 9:07 AM    I was present with the medical student who participated in the service and in the documentation of the note. I have verified the history and personally performed the physical exam and medical decision making. I agree with the assessment and plan of care as documented in the note.     Rene Loja DO, MPH  PGY-1 Psychiatry Resident      Attestation:  I, Joseph Pelaez, saw and evaluated the patient with the resident physician.  I agree with the findings and plan of care as documented in the resident note.  I have reviewed all labs and vital signs.          .

## 2019-10-16 NOTE — PROGRESS NOTES
Pt visible in milieu, keeps to self.  Reports doing better, voices still come and go but pt has been able to keep self distracted.  Rates depression and anxiety at a 3/10.  Denies medication side effects.  Pt's mother came to visit which he states went well.       10/16/19 1400   Behavioral Health   Hallucinations auditory   Thinking distractable   Orientation person: oriented;place: oriented   Memory baseline memory   Insight insight appropriate to situation   Judgement impaired   Eye Contact at examiner   Affect blunted, flat   Mood depressed;anxious;mood is calm   Physical Appearance/Attire attire appropriate to age and situation   Hygiene other (see comment)  (fair)   Suicidality other (see comments)  (denies)   1. Wish to be Dead (Past Month) No   2. Non-Specific Active Suicidal Thoughts (Past Month) No   Self Injury other (see comment)  (denies)   Activity withdrawn;other (see comment)  (visible in milieu)   Speech clear;coherent   Medication Sensitivity no observed side effects;no stated side effects   Psychomotor / Gait balanced;steady   Psycho Education   Type of Intervention 1:1 intervention   Response participates, initiates socially appropriate   Hours 0.5   Treatment Detail check in   Activities of Daily Living   Hygiene/Grooming independent   Oral Hygiene independent   Dress independent;scrubs (behavioral health)   Room Organization independent

## 2019-10-17 LAB
ANA PAT SER IF-IMP: ABNORMAL
ANA SER QL IF: ABNORMAL
ANA TITR SER IF: ABNORMAL {TITER}
CERULOPLASMIN SERPL-MCNC: 34 MG/DL (ref 23–53)

## 2019-10-17 PROCEDURE — 99232 SBSQ HOSP IP/OBS MODERATE 35: CPT | Mod: GC | Performed by: PSYCHIATRY & NEUROLOGY

## 2019-10-17 PROCEDURE — 25000132 ZZH RX MED GY IP 250 OP 250 PS 637: Performed by: STUDENT IN AN ORGANIZED HEALTH CARE EDUCATION/TRAINING PROGRAM

## 2019-10-17 PROCEDURE — G0177 OPPS/PHP; TRAIN & EDUC SERV: HCPCS

## 2019-10-17 PROCEDURE — 12400001 ZZH R&B MH UMMC

## 2019-10-17 RX ADMIN — ARIPIPRAZOLE 5 MG: 5 TABLET ORAL at 22:14

## 2019-10-17 RX ADMIN — FEXOFENADINE HYDROCHLORIDE AND PSEUDOEPHEDRINE HYDROCHLORIDE 1 TABLET: 180; 240 TABLET, FILM COATED, EXTENDED RELEASE ORAL at 08:12

## 2019-10-17 ASSESSMENT — ACTIVITIES OF DAILY LIVING (ADL)
ORAL_HYGIENE: INDEPENDENT
LAUNDRY: WITH SUPERVISION
LAUNDRY: WITH SUPERVISION
HYGIENE/GROOMING: INDEPENDENT
HYGIENE/GROOMING: INDEPENDENT
DRESS: INDEPENDENT
DRESS: INDEPENDENT
ORAL_HYGIENE: INDEPENDENT

## 2019-10-17 NOTE — PROGRESS NOTES
"    ----------------------------------------------------------------------------------------------------------  [facility], [place]   Psychiatric Progress Note  Hospital Day #6     Interim History:   The patient's care was discussed with the treatment team and chart notes were reviewed.    Sleep: 7 hrs  Scheduled Medications: Took all offered   PRNs: Trazdone 50 mg x 2    Staff Report:   Patient called friends as he is trying to find a place to live on discharge. He is also working on [facility]  appointment with [facility]. He reports his voices come and go and is able to distract himself from them. He rates his depression and anxiety a 3/10. His mother visited last night. He doesn't socialize with peers and enjoys reading and playing cards.     Patient Interview:   Patient was interviewed in the conference room on unit 22 this morning. He reports he is doing better today. He states he enjoys listening to the  radio station [id number] because he enjoys rock music and has been using that to distract himself. He states he hasn't noticed any voices at the moment and they seem to be decreasing. He states the groups are \"okay\" and doesn't know what to do in them sometimes. He states he will continue to try and attend to help pass the time in the hospital. He reports that his mother visited yesterday and it's hard to talk to her when she is visits as she was \"making accusations\" about the patient earlier. He states he has also been talking to his friends, but mostly to they want to check in and know how he's doing since he's in the hospital.    Note: Attempted to call Jossy (patient's mother) but was unable to reach her [phone]    The risks, benefits, alternatives and side effects of any medication changes have been discussed and are understood by the patient and other caregivers.    Review of systems:     ROS was negative unless noted above.          Allergies:     Allergies "   Allergen Reactions     Augmentin Nausea     Clindamycin Nausea and Vomiting            Psychiatric Examination:   /87 (BP Location: Left arm)   Pulse 108   Temp 98.6  F (37  C) (Oral)   Resp 16   Wt 94.5 kg (208 lb 6.4 oz)   SpO2 98%   BMI 33.64 kg/m    Weight is 208 lbs 6.4 oz  Body mass index is 33.64 kg/m .    Appearance:  awake, alert, adequately groomed, dressed in hospital scrubs, appeared as age stated, awake, alert, cooperative and no apparent distress  Attitude: somewhat  cooperative  Eye Contact:  fair  Mood:  better  Affect:  mood congruent  Speech:  clear, coherent and normal prosody  Psychomotor Behavior:  no evidence of tardive dyskinesia, dystonia, or tics and fidgeting  Thought Process:  logical, linear and goal oriented  Associations:  no loose associations  Thought Content:  no evidence of suicidal ideation or homicidal ideation, auditory hallucinations present and no visual hallucinations present  Insight:  limited  Judgment:  fair  Oriented to:  time, person, and place  Attention Span and Concentration:  fair  Recent and Remote Memory:  intact  Language: English with normal syntax  Fund of Knowledge: Known deficit  Muscle Strength and Tone: normal  Gait and Station: Normal         Labs:     No results found for this or any previous visit (from the past 24 hour(s)).     Assessment    Diagnostic Impression:   This patient is a 34 year old male with history of depression who presented to [facility] ED  on 10/12/2019 due to increasing AH.  This is in the context of chronic depression and intermittent SI.  MSE was notable for prolonged pausing and hesitancy to discuss his psychotic symptoms.  Possible thought blocking versus on-going PI and/or distress related to psychotic symptoms.      Reason for inpatient hospitalization is to address psychotic and long-standing depressed mood symptoms.  At this time, we will initiate low dose anti-psychotic Abilify 2 mg at bedtime.  Risks/Benefits were  discussed with the patient and consent was signed.  During hospitalization, patient reports that Abilify is helping with auditory hallucinations as well as mood however he remains guarded on talking about specifics of his symptoms. He continues to have paranoia regarding his family making accusations against him, however based on collateral information this seems to be a proponent of patient's delusion.    Hospital course: Judah Candelaria was admitted to station 22 as a voluntary patient. On admission the patient was started on 2mg Abilify PO at bedtime and 50mg Trazadone PO PRN at bedtime for psychotic symptoms and sleep, respectively.  Pt reported improvement in mental state and did not endorse any side effects from the medication. On hospital day three (10/14/2019) the patients Olanzapine 10mg PO PRN and Hydroxizine 25mg PRN were discontinued to limit polypharmacy. On hospital day four (10/15/2019) the patient's scheduled Abilify was increased to 5mg PO at bedtime to improve frequency of AH, which are causing the patient distress at times during the day. SW to investigate opportunities for services and housing as the patient is currently not wanting to return to his previous living situation due to paranoia and thoughts that his family have made accusations against him. First-episode-psychosis work-up started in the ED. On hospital day five (10/16/2019) the patients MRI and Sed Rate for first-episode workup were both wnl. The team met with patient's mother (Jossy) on 10/16 as she had questions on discharge plans, however she was advised that the patient is not yet ready for discharge as he does not feel safe to go back home with her. Will plan to have a family meeting as it appears Jossy has difficulty understanding patient's perspective through his current symptoms. The team will provide JONES resources for her at the next visit to help with some of her questions regarding psychosis.     Discontinued  Medications (& Rationale):  - Discontinue Olanzapine 10mg Q2H PRN and start Abilify 0.5mg PO PRN to limit polypharmacy  - Discontinue 25mg Hydroxizine Q4H PRN for anxiety to limit polypharmacy    Medical course: Patients mother called to report the patient takes Allegra-D for allergies. Patient switched over to D-formulation.     Plan   Today's Changes:  - None    Principal Diagnosis:   # Major Depressive Disorder, Severe, with psychotic features    Psychotropic Medications:    Continue:  - Abilify 5 mg PO QHS  -po Abilify 1mg  Q2h /prn or  IM Olanzapine 10mg Q2H PRN aggression/agitation due to psychosis  - Trazadone 50mg PRN at bedtime for insomnia     - Patient will be treated in therapeutic milieu with appropriate individual and group therapies.     Medical diagnoses:       #hx of congenital aortic stenosis  - EKG shows qtc of 449 with no change from last EKG (10/15/19)     #Allergies  - Fexofenadine-Pseudoephedrine (ALLEGRA-D 24) 180-240mg PO q1d for allergies      Consult: None  Labs:  Admission labs reviewed.  UDS negative.  Slightly elevated WBC noted, patient afebrile.  Continue to monitor for sxs of infection or fever. Will continue with first-episode-psychosis workup by ordering MRI, HIV, Ceruloplasmin, LEENA, ESR, Lyme Titer, and Treponemal Ab.     Legal Status: Voluntary    Safety Assessment:   Behavioral Orders   Procedures     Code 1 - Restrict to Unit     Code 2     Elopement precautions     Routine Programming     As clinically indicated     Self Injury Precaution     Status 15     Every 15 minutes.     Suicide precautions     Patients on Suicide Precautions should have a Combination Diet ordered that includes a Diet selection(s) AND a Behavioral Tray selection for Safe Tray - with utensils, or Safe Tray - NO utensils       Dispo: unknown pending medication management and clinical stabilization    ----    Rene Loja DO, MPH  PGY-1 Psychiatry Resident    Attestation:  This patient has been  seen and evaluated by me, Vania Pabon.  I have discussed this patient with the psychiatry resident and I agree with the findings and plan in this note.    I have reviewed today's vital signs, medications, labs and imaging.   Vania Pabon MD.              .

## 2019-10-17 NOTE — PROGRESS NOTES
"INITIAL O.T. ASSESSMENT:  Judah has attended OT occasionally since admission.    Guarded and tentative. Today attended a group on Patterns of Thought; Quiet though stated that the material \"gave [him] things to think about.\" Stated that he identified most with a pattern of \"overthinking things\" and \"filtering out the positive (elements of a situation)\"    Was given and completed a written self assessment. Cited \"anxiety, depression, hearing things no mater if someone is around or not\" as the reason for current hospitalization. When asked what staff can do to be most helpful during hospitalization, pt responded \"let me take things at my own pace.\"     Goals prioritized for hospitalization (selected from list): Getting through this; Forgiveness; Healing    Goals prioritized for hospitalization (self-described): \"Just to get better\"    OT staff explained the purpose of pt being involved in current treatment plan.      Plan: Encourage ongoing attendance as able to meet self-stated goals, implement positive coping skills, engage in graded opportunities for success, and provide assessment ongoing.       10/17/19 1100   Clinical Impression   Affect Appropriate to situation;Anxious   Orientation Oriented to person, place and time;Needs further assessment   Appearance and ADLs General cleanliness observed in most areas   Attention to Internal Stimuli Appears distracted/preoccupied internally   Interaction Skills Interacts with prompts, minimal response;Guarded   Ability to Communicate Needs Does so with prompts   Verbal Content Selective   Ability to Maintain Boundaries Maintains appropriate physical boundaries;Maintains appropriate verbal boundaries   Participation Participates with frequent encouragement;Observes only   Concentration Concentrates 20-30 minutes;Needs further assessment   Ability to Concentrate With structure;Preoccupied   Follows and Comprehends Directions Follows 1 step with demonstration;Needs further " assessment   Memory Needs further assessment   Organization Independently organizes simple tasks;Needs occasional assistance    Decision Making Independent   Planning and Problem Solving Occasionally needs assist/feedback;Indentifies problems but not alternatives   Ability to Apply and Learn Concepts Applies within group structure;Needs further assessment   Frustrations / Stress Tolerance Utilizes coping skills with prompts;Needs further assessment   Level of Insight Identifies needs with structure/support;Needs further assessment   Self Esteem Takes risks with support and encouragement;Accepts positive feedback   Social Supports Has knowledge of support systems;Needs further assessment

## 2019-10-17 NOTE — PROGRESS NOTES
Pt calm all shift.  Pt does not socialize with other pts.  Pt enjoys reading and playing cards.  Pt shaved and clipped their nails this evening.  Pt is appropriate when conversing.  Pt ate dinner and snacks.  Pt denies SI and SIB.         10/16/19 2200   Behavioral Health   Hallucinations auditory   Thinking distractable   Memory baseline memory   Insight insight appropriate to situation   Judgement impaired   Eye Contact at examiner   Affect blunted, flat   Mood mood is calm   Physical Appearance/Attire attire appropriate to age and situation   Hygiene well groomed   1. Wish to be Dead (Past Month) No   2. Non-Specific Active Suicidal Thoughts (Past Month) No   Activity withdrawn   Speech clear;coherent   Psychomotor / Gait balanced;steady   Psycho Education   Type of Intervention 1:1 intervention   Response participates, initiates socially appropriate   Hours 0.5   Treatment Detail Check-in   Activities of Daily Living   Hygiene/Grooming independent   Oral Hygiene independent   Dress independent   Room Organization independent

## 2019-10-18 PROCEDURE — 12400001 ZZH R&B MH UMMC

## 2019-10-18 PROCEDURE — 25000132 ZZH RX MED GY IP 250 OP 250 PS 637

## 2019-10-18 PROCEDURE — 25000132 ZZH RX MED GY IP 250 OP 250 PS 637: Performed by: STUDENT IN AN ORGANIZED HEALTH CARE EDUCATION/TRAINING PROGRAM

## 2019-10-18 PROCEDURE — 99231 SBSQ HOSP IP/OBS SF/LOW 25: CPT | Mod: GC | Performed by: PSYCHIATRY & NEUROLOGY

## 2019-10-18 RX ORDER — TRAZODONE HYDROCHLORIDE 100 MG/1
100 TABLET ORAL
Status: DISCONTINUED | OUTPATIENT
Start: 2019-10-18 | End: 2019-10-21 | Stop reason: HOSPADM

## 2019-10-18 RX ADMIN — TRAZODONE HYDROCHLORIDE 50 MG: 50 TABLET ORAL at 03:53

## 2019-10-18 RX ADMIN — ARIPIPRAZOLE 5 MG: 5 TABLET ORAL at 21:23

## 2019-10-18 RX ADMIN — FEXOFENADINE HYDROCHLORIDE AND PSEUDOEPHEDRINE HYDROCHLORIDE 1 TABLET: 180; 240 TABLET, FILM COATED, EXTENDED RELEASE ORAL at 09:24

## 2019-10-18 RX ADMIN — TRAZODONE HYDROCHLORIDE 100 MG: 100 TABLET ORAL at 21:23

## 2019-10-18 ASSESSMENT — ACTIVITIES OF DAILY LIVING (ADL)
ORAL_HYGIENE: INDEPENDENT
DRESS: SCRUBS (BEHAVIORAL HEALTH)
LAUNDRY: WITH SUPERVISION
HYGIENE/GROOMING: INDEPENDENT
ORAL_HYGIENE: INDEPENDENT
HYGIENE/GROOMING: INDEPENDENT
DRESS: INDEPENDENT

## 2019-10-18 NOTE — PLAN OF CARE
"Patient stays on periphery of milieu. Minimal interaction with staff and peers. Appears preoccupied and easily distracted. Has been on the phone a few times this shift. He speaks very softly into the handset. Guarded in conversation. States he is \"fine\". He is med compliant. Denies SI and SIB. Damaris Kim RN    "

## 2019-10-18 NOTE — PROGRESS NOTES
"Pt presented with calm affect, organized thinking, and appropriate behavior. He was observed in the milieu throughout shift, socially reserved but participatory with encouragement. Pt reported experiencing circumstantial anxiety, particularly in relation to team meetings: \"Its hard for me to be the focus of attention in a room full of people.\" He acknowledged being an introvert by nature and shared his depression has caused him to become more withdrawn recently. Pt stated these symptoms have improved since admission and was hopeful about returning to baseline. He noted that his auditory hallucinations were no longer present which has given him significant relief. Pt spent majority of shift reading independently. He denied safety concerns and no psychotic symptoms were reported or observed.          10/18/19 1200 Behavioral Health   Hallucinations denies / not responding to hallucinations   Thinking intact   Orientation person: oriented;place: oriented;date: oriented;time: oriented   Memory baseline memory   Insight admits / accepts   Judgement intact   Eye Contact at examiner   Affect full range affect   Mood mood is calm   Physical Appearance/Attire neat;attire appropriate to age and situation   Hygiene well groomed   1. Wish to be Dead (Past Month) No   Wish to be Dead Description (Recent) Pt denies   2. Non-Specific Active Suicidal Thoughts (Past Month) No   Non-Specific Active Suicidal Thought Description (Recent) Pt deneis   Psycho Education   Type of Intervention 1:1 intervention   Response participates with encouragement   Hours 0.5   Treatment Detail Coping skills for anxiety   Activities of Daily Living   Hygiene/Grooming independent   Oral Hygiene independent   Dress independent   Laundry with supervision   Room Organization independent     "

## 2019-10-18 NOTE — PROGRESS NOTES
"    ----------------------------------------------------------------------------------------------------------  [facility], [place]   Psychiatric Progress Note  Hospital Day #7     Interim History:   The patient's care was discussed with the treatment team and chart notes were reviewed.    Sleep: 7 hrs  Scheduled Medications: Took all offered   PRNs: Trazdone 50 mg    Staff Report:   Patient is in the periphery of the milieu, having minimal interaction with staff. He is guarded in his conversation with staff. Reports he is \"fine.\" Patient woke up around 3 am and took trazodone that was offered to him to help with sleep.     Patient Interview:   Patient was interviewed in the conference room on [facility] this morning. He reports he is doing better today. He states he is still having difficulty sleeping at night. He reports the voices he hears are decreasing during his stay here and doesn't believe that is what is keeping him from sleeping. He states he thinks the Abilify is continuing to help him. Patient reports he spoke with his mother yesterday and states the conversation was \"okay.\" He reports he plans to go back home for \"a little while\" after discharge and then hopes to stay with his friends.    The risks, benefits, alternatives and side effects of any medication changes have been discussed and are understood by the patient and other caregivers.    Review of systems:     ROS was negative unless noted above.          Allergies:     Allergies   Allergen Reactions     Augmentin Nausea     Clindamycin Nausea and Vomiting            Psychiatric Examination:   /87 (BP Location: Left arm)   Pulse 113   Temp 97.9  F (36.6  C) (Oral)   Resp 16   Wt 94.5 kg (208 lb 6.4 oz)   SpO2 98%   BMI 33.64 kg/m    Weight is 208 lbs 6.4 oz  Body mass index is 33.64 kg/m .    Appearance:  awake, alert, adequately groomed and dressed in hospital scrubs  Attitude: somewhat  cooperative  Eye " Contact:  fair  Mood:  better  Affect:  intensity is flat  Speech:  clear, coherent and normal prosody  Psychomotor Behavior:  no evidence of tardive dyskinesia, dystonia, or tics and fidgeting  Thought Process:  logical, linear and goal oriented  Associations:  no loose associations  Thought Content:  auditory hallucinations present and no visual hallucinations present  Insight:  limited  Judgment:  fair  Oriented to:  time, person, and place  Attention Span and Concentration:  fair  Recent and Remote Memory:  intact  Language: English with normal syntax  Fund of Knowledge: Known deficit  Muscle Strength and Tone: normal  Gait and Station: Normal         Labs:     No results found for this or any previous visit (from the past 24 hour(s)).     Assessment    Diagnostic Impression:   This patient is a 34 year old male with history of major depressive disorder who presented to [facility] ED  on 10/12/2019 due to increasing auditory hallucinations.  This is in the context of chronic depression and intermittent SI.  MSE was notable for prolonged pausing and hesitancy to discuss his psychotic symptoms.  Possible thought blocking versus on-going PI and/or distress related to psychotic symptoms.  Reason for inpatient hospitalization is to address psychotic and long-standing depressed mood symptoms.  Patient was started on Abilify which has been up-titrated throughout hospitalization. During hospitalization, patient reports that Abilify is helping with auditory hallucinations as well as mood however he remains guarded on talking about specifics of his symptoms. He continues to have paranoia regarding his family making accusations against him, however based on collateral information this seems to be a proponent of patient's delusion.    Hospital course: Judah Candelaria was admitted to station 22 as a voluntary patient. On admission the patient was started on 2mg Abilify PO at bedtime and 50mg Trazadone PO PRN at bedtime for  psychotic symptoms and sleep, respectively.  Pt reported improvement in mental state and did not endorse any side effects from the medication. On hospital day three (10/14/2019) the patients Olanzapine 10mg PO PRN and Hydroxizine 25mg PRN were discontinued to limit polypharmacy. On hospital day four (10/15/2019) the patient's scheduled Abilify was increased to 5mg PO at bedtime to improve frequency of AH, which are causing the patient distress at times during the day. SW to investigate opportunities for services and housing as the patient is currently not wanting to return to his previous living situation due to paranoia and thoughts that his family have made accusations against him. First-episode-psychosis work-up started in the ED. On hospital day five (10/16/2019) the patients MRI and Sed Rate for first-episode workup were both wnl. The team met with patient's mother (Jossy) on 10/16 as she had questions on discharge plans, however she was advised that the patient is not yet ready for discharge as he does not feel safe to go back home with her. Will plan to have a family meeting as it appears Jossy has difficulty understanding patient's perspective through his current symptoms. The team provided JONES resources during a phone update, however it would be worthwhile to give more specific information for families that are available in the [facility].     Discontinued Medications (& Rationale):  - Discontinue Olanzapine 10mg Q2H PRN and start Abilify 0.5mg PO PRN to limit polypharmacy  - Discontinue 25mg Hydroxizine Q4H PRN for anxiety to limit polypharmacy    Medical course: Patients mother called to report the patient takes Allegra-D for allergies. Patient switched over to D-formulation.     Plan   Today's Changes:  - None    Psychotropic Medications:    Continue:  - Abilify 5 mg PO QHS  - PO Abilify 1mg  Q2h /prn or  IM Olanzapine 10mg Q2H PRN aggression/agitation due to psychosis  - Trazadone 50mg PRN at bedtime  for insomnia     Patient will be treated in therapeutic milieu with appropriate individual and group therapies.    Principal Diagnosis:   # Major Depressive Disorder, Severe, with psychotic features    Medical diagnoses:     # hx of congenital aortic stenosis  - EKG shows qtc of 449 with no change from last EKG (10/15/19)     #Allergies  - Fexofenadine-Pseudoephedrine (ALLEGRA-D 24) 180-240mg PO q1d for allergies      Consult: None  Labs:  Admission labs reviewed.  UDS negative.  Slightly elevated WBC noted, patient afebrile.  Continue to monitor for sxs of infection or fever. Will continue with first-episode-psychosis workup by ordering MRI, HIV, Ceruloplasmin, LEENA, ESR, Lyme Titer, and Treponemal Ab.     Legal Status: Voluntary    Safety Assessment:   Behavioral Orders   Procedures     Code 1 - Restrict to Unit     Code 2     Elopement precautions     Routine Programming     As clinically indicated     Self Injury Precaution     Status 15     Every 15 minutes.     Suicide precautions     Patients on Suicide Precautions should have a Combination Diet ordered that includes a Diet selection(s) AND a Behavioral Tray selection for Safe Tray - with utensils, or Safe Tray - NO utensils       Dispo: unknown pending medication management and clinical stabilization    ----    Rene Loja DO, MPH  PGY-1 Psychiatry Resident    ATTENDING:  Joseph CORONEL, saw and evaluated the patient with the resident physician.  I agree with the findings and plan of care as documented in the resident note.  I have reviewed all labs and vital signs.                  .

## 2019-10-18 NOTE — PROGRESS NOTES
At start of the shift, pt observed sleeping in his room quitely; during 0300 safety rounding, pt  noted sitting on the edge of his bed; writer approached and offered PRN trazodone for sleep, pt accepted writer's offer and follow writer to the med room window to receive the medication;  administered 50 mg PO at 0353; calm and pleasant on approach; returned back to bed shortly and able to sleep.    Pt appeared slept for 5.5 hours this shift.

## 2019-10-19 PROCEDURE — 12400001 ZZH R&B MH UMMC

## 2019-10-19 PROCEDURE — 25000132 ZZH RX MED GY IP 250 OP 250 PS 637: Performed by: STUDENT IN AN ORGANIZED HEALTH CARE EDUCATION/TRAINING PROGRAM

## 2019-10-19 PROCEDURE — 25000132 ZZH RX MED GY IP 250 OP 250 PS 637

## 2019-10-19 RX ADMIN — ARIPIPRAZOLE 5 MG: 5 TABLET ORAL at 21:07

## 2019-10-19 RX ADMIN — TRAZODONE HYDROCHLORIDE 100 MG: 100 TABLET ORAL at 21:06

## 2019-10-19 RX ADMIN — FEXOFENADINE HYDROCHLORIDE AND PSEUDOEPHEDRINE HYDROCHLORIDE 1 TABLET: 180; 240 TABLET, FILM COATED, EXTENDED RELEASE ORAL at 08:07

## 2019-10-19 ASSESSMENT — ACTIVITIES OF DAILY LIVING (ADL)
DRESS: STREET CLOTHES;INDEPENDENT
ORAL_HYGIENE: INDEPENDENT
HYGIENE/GROOMING: INDEPENDENT
LAUNDRY: WITH SUPERVISION
ORAL_HYGIENE: INDEPENDENT
DRESS: INDEPENDENT
HYGIENE/GROOMING: INDEPENDENT

## 2019-10-19 NOTE — PROGRESS NOTES
Pt was intermittently present in milieu but socially withdrawn. Pt was observed watching TV, resting in room, eating snack/dinner, talking on the phone, and reading. Pt presented with a blunt/flat to full range affect and remained calm throughout the evening. It is unclear if the pt is actively paranoid but he was seen looking down the hallway from his bedroom door in an odd manner. Pt denied experiencing any mental health symptoms including SI, HI, and SIB.        10/18/19 1703   Behavioral Health   Hallucinations denies / not responding to hallucinations   Thinking   (mostly intact, possibly paranoid)   Orientation person: oriented   Memory   (unsubstantiated)   Insight   (unsubstantiated)   Judgement   (appears intact)   Eye Contact at examiner   Affect full range affect   Mood anxious;mood is calm   Physical Appearance/Attire attire appropriate to age and situation   Hygiene   (adequate)   Suicidality   (pt denied thoughts and urges)   Self Injury   (pt denied thoughts and urges)   Elopement   (no apparent risk)   Activity withdrawn   Speech clear;coherent   Medication Sensitivity no stated side effects;no observed side effects   Psychomotor / Gait balanced;steady   Psycho Education   Type of Intervention 1:1 intervention   Response participates, initiates socially appropriate   Hours 0.5   Treatment Detail   (check-in)   Activities of Daily Living   Hygiene/Grooming independent   Oral Hygiene independent   Dress scrubs (behavioral health)   Laundry   (pt did not do laundry)   Room Organization independent   Activity   Activity Assistance Provided independent

## 2019-10-19 NOTE — PLAN OF CARE
Patient did attend group today. He otherwise is minimally social. Able to comfortably be out in lounge. His affect is flat and blunted. Guarded in conversation with staff. States the meds are helping. Denies SI and SIB. Damaris Kim RN

## 2019-10-20 PROCEDURE — 25000132 ZZH RX MED GY IP 250 OP 250 PS 637

## 2019-10-20 PROCEDURE — 12400001 ZZH R&B MH UMMC

## 2019-10-20 PROCEDURE — 25000132 ZZH RX MED GY IP 250 OP 250 PS 637: Performed by: STUDENT IN AN ORGANIZED HEALTH CARE EDUCATION/TRAINING PROGRAM

## 2019-10-20 RX ADMIN — FEXOFENADINE HYDROCHLORIDE AND PSEUDOEPHEDRINE HYDROCHLORIDE 1 TABLET: 180; 240 TABLET, FILM COATED, EXTENDED RELEASE ORAL at 09:04

## 2019-10-20 RX ADMIN — TRAZODONE HYDROCHLORIDE 100 MG: 100 TABLET ORAL at 21:16

## 2019-10-20 RX ADMIN — ARIPIPRAZOLE 5 MG: 5 TABLET ORAL at 21:16

## 2019-10-20 ASSESSMENT — ACTIVITIES OF DAILY LIVING (ADL)
DRESS: INDEPENDENT;SCRUBS (BEHAVIORAL HEALTH)
HYGIENE/GROOMING: INDEPENDENT
DRESS: INDEPENDENT
ORAL_HYGIENE: INDEPENDENT
HYGIENE/GROOMING: INDEPENDENT
ORAL_HYGIENE: INDEPENDENT

## 2019-10-20 NOTE — PROGRESS NOTES
Pt was intermittently present present but mostly socially withdrawn in the milieu throughout the shift. Pt was observed laying in bed, reading, talking on the phone, watching TV, eating snack/dinner, and meeting with visitors (friends). Pt presented with a blunt/flat to depressed affect and remainied calm throughout the shift. Pt indicated to this  that he was doing better today than yesterday. Pt denied experiencing SI, HI, and SIB.       10/19/19 1724   Behavioral Health   Hallucinations denies / not responding to hallucinations   Thinking   (appears intact)   Orientation person: oriented;place: oriented   Memory   (appears baseline)   Insight insight appropriate to situation;insight appropriate to events   Judgement   (appears intact)   Eye Contact at examiner   Affect   (depressed, blunt/flat)   Mood mood is calm   Physical Appearance/Attire attire appropriate to age and situation   Hygiene   (adequate)   Suicidality   (pt denies thoughts and urges)   Self Injury   (pt denies thoughts and urges)   Elopement   (no apparent risk)   Activity withdrawn   Speech   (clear, coherent, minimal)   Medication Sensitivity no stated side effects;no observed side effects   Psychomotor / Gait balanced;steady   Psycho Education   Type of Intervention 1:1 intervention   Response   (minimal participation)   Hours 0.5   Treatment Detail   (check-in)   Activities of Daily Living   Hygiene/Grooming independent   Oral Hygiene independent   Dress street clothes;independent   Laundry   (pt did not do laundry)   Room Organization independent   Activity   Activity Assistance Provided independent

## 2019-10-20 NOTE — PROGRESS NOTES
Patient slept 4.5 hours overnight. Patient spent awake time reading quietly in his room. Friendly upon approach.

## 2019-10-20 NOTE — PROGRESS NOTES
Pt was visible in the milieu, generally kept to themselves reading. Pt reports lower back pain, given hot packs. Pt denies SI/SIB, hallucinations, and medication side effects. Pt hopes to discharge tomorrow and stay with his parents until they are able to find a place with a friend. Pt was calm and cooperative throughout the day, expressed interest in attending group later this afternoon.      10/20/19 1100   Behavioral Health   Hallucinations denies / not responding to hallucinations   Thinking intact   Orientation person: oriented;place: oriented;date: oriented   Memory baseline memory   Insight insight appropriate to situation   Judgement impaired   Eye Contact at examiner   Affect blunted, flat   Mood mood is calm   Physical Appearance/Attire attire appropriate to age and situation   Hygiene other (see comment)  (adequate )   Suicidality other (see comments)  (denies)   1. Wish to be Dead (Past Month) No   2. Non-Specific Active Suicidal Thoughts (Past Month) No   Self Injury other (see comment)  (denies)   Activity withdrawn   Speech clear;coherent   Medication Sensitivity no stated side effects;no observed side effects   Psychomotor / Gait balanced;steady   Psycho Education   Type of Intervention 1:1 intervention   Response participates, initiates socially appropriate   Hours 0.5   Treatment Detail check in    Activities of Daily Living   Hygiene/Grooming independent   Oral Hygiene independent   Dress independent;scrubs (behavioral health)   Room Organization independent   Activity   Activity Assistance Provided independent

## 2019-10-21 VITALS
WEIGHT: 211 LBS | TEMPERATURE: 98 F | OXYGEN SATURATION: 96 % | HEART RATE: 116 BPM | BODY MASS INDEX: 34.06 KG/M2 | DIASTOLIC BLOOD PRESSURE: 83 MMHG | SYSTOLIC BLOOD PRESSURE: 124 MMHG | RESPIRATION RATE: 16 BRPM

## 2019-10-21 PROCEDURE — 99238 HOSP IP/OBS DSCHRG MGMT 30/<: CPT | Mod: GC | Performed by: PSYCHIATRY & NEUROLOGY

## 2019-10-21 PROCEDURE — 25000132 ZZH RX MED GY IP 250 OP 250 PS 637: Performed by: STUDENT IN AN ORGANIZED HEALTH CARE EDUCATION/TRAINING PROGRAM

## 2019-10-21 RX ORDER — TRAZODONE HYDROCHLORIDE 100 MG/1
100 TABLET ORAL
Qty: 30 TABLET | Refills: 0 | Status: ON HOLD | OUTPATIENT
Start: 2019-10-21 | End: 2021-06-29

## 2019-10-21 RX ORDER — ARIPIPRAZOLE 5 MG/1
5 TABLET ORAL AT BEDTIME
Qty: 30 TABLET | Refills: 0 | Status: ON HOLD | OUTPATIENT
Start: 2019-10-21 | End: 2021-06-29

## 2019-10-21 RX ADMIN — FEXOFENADINE HYDROCHLORIDE AND PSEUDOEPHEDRINE HYDROCHLORIDE 1 TABLET: 180; 240 TABLET, FILM COATED, EXTENDED RELEASE ORAL at 09:28

## 2019-10-21 ASSESSMENT — ACTIVITIES OF DAILY LIVING (ADL): HYGIENE/GROOMING: INDEPENDENT

## 2019-10-21 NOTE — PROGRESS NOTES
Pt was intermittently present in the milieu throughout the shift but was mostly socially withdrawn. Pt was observed watching TV, reading, and eating snack/dinner. Pt presented with a blunt/flat to full range affect, was pleasant upon approach, and reamnied calm throughout the shift. Pt expressed that he hopes to be discharged at some point tomorrow. Pt stated he feels that his medication has been helping and denied experiencing any mental health symptoms including SI, HI, and SIB.        10/20/19 1907   Behavioral Health   Hallucinations denies / not responding to hallucinations   Thinking   (appears intact)   Orientation person: oriented;place: oriented;date: oriented   Memory   (appears baseline)   Insight insight appropriate to situation;insight appropriate to events   Judgement   (appears intact)   Eye Contact at examiner   Affect blunted, flat;full range affect   Mood mood is calm   Physical Appearance/Attire attire appropriate to age and situation   Hygiene   (adequate)   Suicidality   (pt denied thoughts and urges)   Self Injury   (pt denied thoughts and urges)   Elopement   (no apparent risk)   Activity withdrawn   Speech clear;coherent   Medication Sensitivity no stated side effects;no observed side effects   Psychomotor / Gait balanced;steady   Psycho Education   Type of Intervention 1:1 intervention   Response participates with encouragement   Hours 0.5   Treatment Detail   (check-in)   Activities of Daily Living   Hygiene/Grooming independent   Oral Hygiene independent   Dress independent   Laundry   (pt did not do laundry)   Room Organization independent   Activity   Activity Assistance Provided independent

## 2019-10-21 NOTE — DISCHARGE SUMMARY
"    Psychiatric Discharge Summary    Hospital Day #10    Judah Candelaria MRN# [id number]   Age: 34 year old YOB: 1985     Date of Admission:  10/11/2019  Date of Discharge:  10/21/2019  Admitting Physician:  Jacquelyn Lomas MD  Discharge Physician:  Vania Pabon MD         Event Leading to Hospitalization:   \"This patient is a 34 year old male with past history of developmental delay who presented on 10/12/2019 due to increased depression and AH. He has no prior psychiatric hospitalizations. The patient reported feeling increasingly depressed over few month and having gradually increasing AH over the last 6 months.  He told two of his friends about his feelings and they convinced him to come to [facility] ED. He was medically cleared for admission to inpatient psychiatric unit.     The patient reported at first thinking that people at work were harassing him, feeling stalked, and that his phone was causing him to hear things.  He went as far as changing his phone number but realized that the AH continued.  At times the AH have been commanding, encouraging him to commit suicide.  Per history, he has been experiencing depression with SI for some time, and at one point, the patient considered to walk into traffic in the context of hearing AH that encouraged him to do so.     Overall, the patient finds it difficult to talk about his experience of AH or paranoia, often taking long pauses and not answering questions.  It is noted that he is able to discuss other aspects of his hisotry without hesitancy.\"        See Admission note by Dr. Jacquelyn Lomas on 10/12/19 for additional details.          Diagnoses:     Principal Diagnosis:   # Major Depressive Disorder, Severe, with psychotic features         Consults:     None           Hospital Course:   Psychiatric Course:  Judah Candelaria was admitted to [facility] with attending Vania Pabon as a voluntary patient. PTA " medication were Allegra-D for seasonal allergies.     This patient is a 34 year old male with history of major depressive disorder who presented to [facility] ED  on 10/12/2019 due to increasing auditory hallucinations.  This is in the context of chronic depression and intermittent SI.  MSE was notable for prolonged pausing and hesitancy to discuss his psychotic symptoms and remained guarded through hospitalization. Reason for inpatient hospitalization is to address psychotic and long-standing depressed mood symptoms. Initially, patient was very reluctant to talk about going home as he stated his parents were against him and had paranoia regarding his family making accusations against him, however based on collateral information this seems to be a proponent of patient's delusion. Towards end of hospitalizations, patient was less paranoid and wanted to go back home. He did express interest in living on his own in the future, but did not demonstrate previous delusions about his family.     On admission the patient was started on 2 mg Abilify PO at bedtime and 50 mg Trazadone PO PRN at bedtime for psychotic symptoms and sleep, respectively. First-episode-psychosis work-up started in the ED and MRI with all subsequent labs were within normal limits. Patient was started on Abilify which has been up-titrated throughout hospitalization and was discharged with Abilify 5 mg at bedtime, which he tolerated well. The medication also improved patients auditory hallucinations and mood, although he remained guarded discussing the specifics of these symptoms. Patient preferred speaking in smaller groups, so his reluctance could have been due to speaking in front of a large team. He was also discharged with trazodone 100 mg for sleep as needed which he took throughout hospitalization and helped him improve sleep, which had been a concern for him.    There was question of developmental delay during hospitalization as patient's mother had  "discussed this diagnosis and reported his IQ was 71. Per chart review, neuropsychological testing by Thais Rich Ph.D. LP on 11/05/2009 stated \"Mr. Candelaria is currently functioning in the average range with regard to general intellectual ability... It is felt that Mr. Ramirez overall functioning is related to an interaction between mild cognitive inefficiencies and depression.\" No other mention of any developmental or cognitive delay was noted.     Judah Candelaria was discharged to his parents' home. At the time of discharge Judah Candelaria was determined to not be a danger to himself or others.    Medical Course:  None    Risk Assessment:  Judah Candelaria has notable risk factors for self-harm, including psychosis. However, risk is mitigated by ability to volunteer a safety plan and history of seeking help when needed. Additional steps taken to minimize risk include: having close outpatient follow up and commitment of family to help care for the patient. Therefore, based on all available evidence including the factors cited above, Judah Candelaria does not appear to be at imminent risk for self-harm, and is appropriate for outpatient level of care.     This document serves as a transfer of care to Judah Candelaria's outpatient providers.         Discharge Medications:     Current Discharge Medication List      START taking these medications    Details   ARIPiprazole (ABILIFY) 5 MG tablet Take 1 tablet (5 mg) by mouth At Bedtime  Qty: 30 tablet, Refills: 0    Associated Diagnoses: Psychosis, unspecified psychosis type (H)      traZODone (DESYREL) 100 MG tablet Take 1 tablet (100 mg) by mouth nightly as needed for sleep  Qty: 30 tablet, Refills: 0    Associated Diagnoses: Psychosis, unspecified psychosis type (H)         CONTINUE these medications which have NOT CHANGED    Details   fexofenadine-pseudoePHEDrine (ALLEGRA-D 24) 180-240 MG 24 hr tablet Take 1 tablet by mouth daily  Qty: 90 tablet, " Refills: 1    Associated Diagnoses: Chronic rhinitis                  Psychiatric Examination:   Appearance:  awake, alert and adequately groomed  Attitude:  cooperative  Eye Contact:  good  Mood:  better  Affect:  appropriate and in normal range  Speech:  clear, coherent  Psychomotor Behavior:  no evidence of tardive dyskinesia, dystonia, or tics  Thought Process:  logical and linear  Associations:  no loose associations  Thought Content:  no evidence of suicidal ideation or homicidal ideation, no auditory hallucinations present and no visual hallucinations present  Insight:  good  Judgment:  intact  Oriented to:  time, person, and place  Attention Span and Concentration:  intact  Recent and Remote Memory:  fair  Language: Fluent in the English language  Fund of Knowledge: appropriate  Muscle Strength and Tone: normal  Gait and Station: Normal         Discharge Plan:   Psychiatric Appointments:     2019 - 12:30pm (arrive for intake paperwork) - 1:00pm - Psychiatry Medication Management - LUCI Rendon    Psychotherapy Appointments:     2019 - 2:00pm - Therapist - ALONZO Fritz      Pt seen and discussed with my attending, Vania Pabon MD.    Rene Loja Do, MPH  PGY-1 Psychiatry Resident       Attestation:   The patient has been seen and evaluated by me, Vania Pabon. I have examined the patient today and reviewed the discharge plan with the resident. I agree with the final assessment and plan, as noted in the discharge summary. I have reviewed today's vital signs, medications, labs and imaging.  Total time discharge plannin minutes  Vania Pabon MD          Appendix A: All Labs This Admission:     Results for orders placed or performed during the hospital encounter of 10/11/19   MRI Brain w/o contrast    Narrative    MR BRAIN W/O CONTRAST 10/15/2019 5:25 PM    Provided History: Abnormal auditory perceptions; First  episode  psychotic episode    Comparison:  Head CT on 10/11/2019     Technique: Sagittal T1-weighted and axial T2-weighted, turboFLAIR and  diffusion-weighted with ADC map images of the brain were obtained  without intravenous contrast.    Findings: No intracranial mass lesion, mass effect, midline shift or  abnormal extraaxial fluid collection. The ventricles and sulci are  normal for age. Diffusion-weighted images demonstrate no abnormality  of reduced diffusion.  Normal intravascular flow voids.    Mild paranasal sinus mucosal thickening.      Impression    Impression: Normal brain MRI.    I have personally reviewed the examination and initial interpretation  and I agree with the findings.    CINDI LEON MD   UA with Microscopic reflex to Culture   Result Value Ref Range    Color Urine Yellow     Appearance Urine Clear     Glucose Urine Negative NEG^Negative mg/dL    Bilirubin Urine Negative NEG^Negative    Ketones Urine Negative NEG^Negative mg/dL    Specific Gravity Urine 1.014 1.003 - 1.035    Blood Urine Negative NEG^Negative    pH Urine 7.0 5.0 - 7.0 pH    Protein Albumin Urine Negative NEG^Negative mg/dL    Urobilinogen mg/dL Normal 0.0 - 2.0 mg/dL    Nitrite Urine Negative NEG^Negative    Leukocyte Esterase Urine Negative NEG^Negative    Source Midstream Urine     WBC Urine <1 0 - 5 /HPF    RBC Urine <1 0 - 2 /HPF    Mucous Urine Present (A) NEG^Negative /LPF   Lipid panel   Result Value Ref Range    Cholesterol 136 <200 mg/dL    Triglycerides 110 <150 mg/dL    HDL Cholesterol 37 (L) >39 mg/dL    LDL Cholesterol Calculated 77 <100 mg/dL    Non HDL Cholesterol 99 <130 mg/dL   Hepatic panel   Result Value Ref Range    Bilirubin Direct 0.2 0.0 - 0.2 mg/dL    Bilirubin Total 0.8 0.2 - 1.3 mg/dL    Albumin 3.9 3.4 - 5.0 g/dL    Protein Total 7.8 6.8 - 8.8 g/dL    Alkaline Phosphatase 108 40 - 150 U/L    ALT 48 0 - 70 U/L    AST 20 0 - 45 U/L   Vitamin B12   Result Value Ref Range    Vitamin B12 424 193 -  986 pg/mL   Folate   Result Value Ref Range    Folate 12.2 >5.4 ng/mL   Hematocrit   Result Value Ref Range    Hematocrit 51.9 40.0 - 53.0 %   Erythrocyte sedimentation rate auto   Result Value Ref Range    Sed Rate 3 0 - 15 mm/h   HIV Antigen Antibody Combo   Result Value Ref Range    HIV Antigen Antibody Combo Nonreactive NR^Nonreactive       Ceruloplasmin   Result Value Ref Range    Ceruloplasmin 34 23 - 53 mg/dL   Lyme Disease Noelle with reflex to WB Serum   Result Value Ref Range    Lyme Disease Antibodies Serum 0.19 0.00 - 0.89   Treponema Abs w Reflex to RPR and Titer   Result Value Ref Range    Treponema Antibodies Nonreactive NR^Nonreactive   Vitamin B12   Result Value Ref Range    Vitamin B12 428 193 - 986 pg/mL   Folate   Result Value Ref Range    Folate 12.6 >5.4 ng/mL   Anti Nuclear Noelle IgG by IFA with Reflex   Result Value Ref Range    LEENA interpretation Borderline Positive (A) NEG^Negative    LEENA pattern 1 SPECKLED     LEENA titer 1 1:40    EKG 12-lead, complete   Result Value Ref Range    Interpretation ECG Click View Image link to view waveform and result

## 2019-10-21 NOTE — PLAN OF CARE
Patient participated in 2 partial OT groups with no charge for either. He was attentive during discussion about emotional management and using structured, interest based activities in addition to grounding exercises. He shared that he likes to go to the zoo and take photos. He likes to play video games.  He participated in some of OT clinic group using structured, creative expression tasks to engage focus and other cognitive processes and facilitate emotional regulation. He selected a goal-directed task, painting a sun-catcher frame. He was organized in his task and focus for the relatively short time he was in group.

## 2019-10-21 NOTE — PLAN OF CARE
Jennifer states no depression, no hallucinations, meds working and wants to discharge.  He is forward thinking and wants mom to pick him up.  He will  his knife at security when he is discharged.  Mother is here at this time now and discharge will proceed.

## 2019-10-21 NOTE — DISCHARGE INSTRUCTIONS
Behavioral Discharge Planning and Instructions      Summary:  You were admitted on 10/11/2019  due to auditory hallucinations.  You were treated by Dr. Vania Pabon MD and discharged on 10/21/19 from [facility] to Home      Principal Diagnosis: Major Depressive Disorder, Severe, with psychotic features     Health Care Follow-up Appointments:       Wednesday October 30, 2019 - 12:30pm (arrive for intake paperwork) - 1:00pm - Psychiatry Medication Management - LUCI Rendon    Wednesday October 30, 2019 - 2:00pm - Therapist - [facility]  [facility] [address] [place] [address] [address]. [facility], [address] phone:[phone] fax:[phone]    Attend all scheduled appointments with your outpatient providers. Call at least 24 hours in advance if you need to reschedule an appointment to ensure continued access to your outpatient providers.   Major Treatments, Procedures and Findings:  You were provided with: a psychiatric assessment, assessed for medical stability, medication evaluation and/or management, group therapy and milieu management    Symptoms to Report: feeling more aggressive, increased confusion, losing more sleep, mood getting worse or thoughts of suicide    Early warning signs can include: increased depression or anxiety sleep disturbances increased thoughts or behaviors of suicide or self-harm  increased unusual thinking, such as paranoia or hearing voices    Safety and Wellness:  Take all medicines as directed.  Make no changes unless your doctor suggests them.      Follow treatment recommendations.  Refrain from alcohol and non-prescribed drugs.     Resources:   Crisis Intervention: [phone] or [phone] (TTY: 176.521.9654).  Call anytime for help.  National [place] on Mental Illness (www.[place].naye.org): 805.927.4071 or 998-528-2259.  [facility] Crisis Response [phone]      The treatment team has appreciated the opportunity to work with you.     If you have any  questions or concerns our unit number is [phone]

## 2019-10-21 NOTE — PROGRESS NOTES
Re - Psychiatry Follow Up    [facility] Adult [facility] -[phone] [facility] has service of portal for priority psychiatry appointments with area clinics available for scheduling via their staff in Adult intake.   This writer called [facility]  intake requested adult intake. No staff available for phone call and message left with staff in  to forward on to adult intake workers for call back. Message to call this writer directly as needing to schedule appointment. Will continue to follow up including if not hearing back from [facility] will contact clinics directly.       This writer did inquire with [facility] in [place] for therapy and mental health services per mother's request - patient was okay with this as well.   Per clinic due to them moving they will not have new intakes for therapy until November 11, 2019. They have some availability for telepsychiatry however would need referral once seen by therapist.     This writer spoke with [facility] unit and scheduled patient for psychiatry and therapy next Thursday 10/30/19 at [facility] - see discharge instructions.

## 2019-10-22 ENCOUNTER — TELEPHONE (OUTPATIENT)
Dept: FAMILY MEDICINE | Facility: CLINIC | Age: 34
End: 2019-10-22

## 2019-10-22 NOTE — TELEPHONE ENCOUNTER
Please contact patient for In-patient follow up.  [phone] (home) none (work)    Visit date: 10/21/2019  Diagnosis listed:Psychosis, Unspecified Psychosis Type (H)  Number of visits in past 12 months:1/1

## 2019-10-22 NOTE — TELEPHONE ENCOUNTER
ED / Discharge Outreach Protocol    Patient Contact    Attempt # 1    Was call answered?  No.  Left message on voicemail with information to call me back.    Lisa TODD RN

## 2019-10-23 NOTE — TELEPHONE ENCOUNTER
ED / Discharge Outreach Protocol    Patient Contact    Attempt # 2    Was call answered?  No.  Unable to leave message.  It sounded like someone answered, but no one said anything and there were some weird noises in the background and then they hung up.

## 2019-10-25 NOTE — TELEPHONE ENCOUNTER
ED / Discharge Outreach Protocol    Patient Contact    Attempt # 3    Was call answered?  No.  Left message on voicemail with information to call me back.    Sariah Calderon RN -- [facility]

## 2019-11-05 ENCOUNTER — TELEPHONE (OUTPATIENT)
Dept: CARDIOLOGY | Facility: CLINIC | Age: 34
End: 2019-11-05

## 2019-11-05 NOTE — TELEPHONE ENCOUNTER
"Called and spoke with patient's mother. She states that when she returned from being out of town on Sunday night for three days, Judah had very swollen ankles, \"which did not indent when pushed on.\" Mom said [place] said they had been this way for a \"couple of days.\" Judah is taking Abilify and Trazadone, as well as Allegra D. The next day, his ankles were still swollen, but when a pressed, the finger indentation lasted a little bit of time. Mom is worried.    Patient is overdue for follow up with Dr. Paez and echo since May 2019. Will discuss with team and work to get him scheduled.  "

## 2019-11-05 NOTE — TELEPHONE ENCOUNTER
Melly called and left a message very concerned about her son's swelling in feet and ankles and how it is causing a lot of pain. She would like a call today at [phone].

## 2019-11-07 NOTE — TELEPHONE ENCOUNTER
----- Message from Angelic Schroeder sent at 11/6/2019  8:40 AM CST -----  Regarding: RE: schedule  I scheduled this patient for an echo at 1 pm (just a regular echo, not Congenital) and Dr. Paez at 2 pm. I haven't had a chance to call the patient yet. Could you possibly do that? I keep getting a call or a patient comes in to schedule every time I try to call.    Thank you,     Angelic   ----- Message -----  From: Heather Osborne RN  Sent: 11/5/2019   3:54 PM CST  To: Anny Harper CMA  Subject: schedule                                         Please schedule patient for Dr. Paez on November 15 either 1pm or 2pm, with regular echo beforehand.    Please call patient and confirm times. They already know about day.    Thanks!  Heather

## 2019-11-15 ENCOUNTER — ANCILLARY PROCEDURE (OUTPATIENT)
Dept: CARDIOLOGY | Facility: CLINIC | Age: 34
End: 2019-11-15
Attending: INTERNAL MEDICINE
Payer: COMMERCIAL

## 2019-11-15 VITALS
HEART RATE: 89 BPM | DIASTOLIC BLOOD PRESSURE: 86 MMHG | OXYGEN SATURATION: 100 % | BODY MASS INDEX: 36.15 KG/M2 | WEIGHT: 224 LBS | SYSTOLIC BLOOD PRESSURE: 143 MMHG

## 2019-11-15 DIAGNOSIS — Q23.81 BICUSPID AORTIC VALVE: ICD-10-CM

## 2019-11-15 PROCEDURE — 99213 OFFICE O/P EST LOW 20 MIN: CPT | Mod: ZP | Performed by: INTERNAL MEDICINE

## 2019-11-15 PROCEDURE — G0463 HOSPITAL OUTPT CLINIC VISIT: HCPCS | Mod: 25,ZF

## 2019-11-15 PROCEDURE — 93005 ELECTROCARDIOGRAM TRACING: CPT | Mod: ZF

## 2019-11-15 PROCEDURE — 93010 ELECTROCARDIOGRAM REPORT: CPT | Mod: ZP | Performed by: INTERNAL MEDICINE

## 2019-11-15 RX ORDER — METOPROLOL SUCCINATE 25 MG/1
25 TABLET, EXTENDED RELEASE ORAL DAILY
Qty: 90 TABLET | Refills: 3 | Status: ON HOLD | OUTPATIENT
Start: 2019-11-15 | End: 2021-06-29

## 2019-11-15 RX ADMIN — Medication 3 ML: at 13:30

## 2019-11-15 ASSESSMENT — PAIN SCALES - GENERAL: PAINLEVEL: NO PAIN (0)

## 2019-11-15 NOTE — NURSING NOTE
Chief Complaint   Patient presents with     Follow Up     34 year old male with history of BAV presenting for evaluation.     Vitals taken, medications reconciled, EKG taken.    Skip Felix, EMT

## 2019-11-15 NOTE — PATIENT INSTRUCTIONS
You were seen today in the Adult Congenital and Cardiovascular Genetics Clinic at the [facility].    Cardiology Providers you saw during your visit:  MATHEW Paez MD    Diagnosis:  BAV    Results:  MATHEW Paez MD reviewed the results of your echo testing today in clinic.    Recommendations:    1. Continue to eat a heart healthy, low salt diet.  2. Continue to get 20-30 minutes of aerobic activity, 4-5 days per week.  Examples of aerobic activity include walking, running, swimming, cycling, etc.  3. Continue to observe good oral hygiene, with regular dental visits.  4. Begin taking 25 mg Toprol daily.  5. Increase exercise and watch diet to work on weight loss.  6. Please monitor your blood pressure at home and let us know your readings.      SBE prophylaxis:   Yes____  No_X___    Lifelong Bacterial Endocarditis Prophylaxis:  YES____  NO____    If YES is checked, follow the recommendations outlined below:  1. Take antibiotic(s) prior to recommended dental procedures and procedures on the respiratory tract or with infected skin, muscle or bones. SBE prophylaxis is not needed for routine GI and  procedures (ie. Colonoscopy or vaginal delivery)  2. Observe good oral hygiene daily, as advised by your dentist. Get regular professional dental care.  3. Keep cuts clean.  4. Infections should be treated promptly.  5. Symptoms of Infective Endocarditis could include: fever lasting more than 4-5 days or a recurrent fever that initially resolves but returns within 1-2 days)      Exercise restrictions:   Yes__X__  No____         If yes, list restrictions:  Must be allowed to rest if fatigued or SOB      Work restrictions:  Yes____  No_X___         If yes, list restrictions:    FASTING CHOLESTEROL was checked in the last 5 years YES_X__  NO___ (2018)  Continue to eat a heart healthy, low salt diet.         ____ Fasting lipid panel order today         ____ No changes in medications          ____ I recommend  the following changes in your cholesterol medications.:          ____ Please follow up for cholesterol screening at your primary care physician      Follow-up:  Follow up in one year with Dr. Paez, an echo, EKG, and labs.    If you have questions or concerns please contact us at:    Heather Osborne, MSN, RN, CNL    Anny Harper (Scheduling)  Nurse Care Coordinator     Clinic   Adult Congenital and CV Genetics   Adult Congenital and CV Genetic  [facility]   [facility]  (P) [phone]     (P) [phone]  [email or web address]   (F) [phone]        For after hours urgent needs, call [phone] and ask to speak to the Adult Congenital Physician on call.  Mention Job Code 0401.    For emergencies call 911.    [facility]  [facility]   Clinics and [facility] Center  Mail Code [id number]  [address] [place]  [address]

## 2019-11-15 NOTE — PROGRESS NOTES
CARDIOLOGY CONSULTATION:    Mr. Candelaria is a 34-year-old gentleman who I met 5/2018 with a past medical history significant for congenital heart disease.  Apparently this was diagnosed in infancy when they heard a murmur.  He was followed at [facility] in [place], [place], under the care of Dr. Allen and had his first surgery at 1-1/4 years of age.  At this time, we do not have this operative report, but mother reports it was a VSD repair and she thinks mitral valve repair.  He had a second surgery in 4th grade, also by Dr. Allen, but we do not have the details of that and mom does not remember what the surgery was for.  They moved to [place] in 1998 and was followed by Dr. Burr until 2006, when he was lost to followup.  At the time of his last visit, apparently he was doing well without any concerning symptoms.      He works in [employer]. He is single, has no children.  He lives with his mom.  He has 1 younger brother with cerebral palsy who is 21 and an older brother who is 34. There is no family history of congenital heart disease.    Mr. Candelaria is not particularly active.  He only gets around at work.  His BMI is 36, up from 34 last year (210 to 224 now.   He knows he needs to work on diet and exercise.      He was recently hospitalized at [facility] with Major Depressive Disorder with psychotic features. He was discharged on Abilify and Trazodone for sleep. His work hours are 4:30 am - 3:pm, which has made sleeping difficult and lack of sleep may have contributed.    Mom noted some ankle swelling and they were do for follow up, so they are in today.  His echo is stable - mean gradient across his MV increased just a bit from 5 mmHg to 7-8 mmHg, but BP and HR are up, which likely is the etiology.  He is not on BP meds    PAST MEDICAL HISTORY:  No past medical history on file.    CURRENT MEDICATIONS:  Current Outpatient Medications   Medication Sig Dispense Refill     ARIPiprazole  (ABILIFY) 5 MG tablet Take 1 tablet (5 mg) by mouth At Bedtime 30 tablet 0     fexofenadine-pseudoePHEDrine (ALLEGRA-D 24) 180-240 MG 24 hr tablet Take 1 tablet by mouth daily 90 tablet 1     traZODone (DESYREL) 100 MG tablet Take 1 tablet (100 mg) by mouth nightly as needed for sleep (Patient taking differently: Take 150 mg by mouth nightly as needed for sleep ) 30 tablet 0       PAST SURGICAL HISTORY:  Past Surgical History:   Procedure Laterality Date     C REPR PDA BY EDI  11/1988     HC VALVULOPLASTY, AORTIC  10/13/97       ALLERGIES  Augmentin and Clindamycin    FAMILY HX:  Family History   Problem Relation Age of Onset     Hypertension Maternal Grandmother      Hypertension Maternal Grandfather         Heart Disease     Cancer Maternal Grandfather         skin/Prostate     Coronary Artery Disease Maternal Grandfather      Prostate Cancer Maternal Grandfather      Coronary Artery Disease Paternal Grandfather      Family History Negative Mother      Family History Negative Father        SOCIAL HX:  Social History     Socioeconomic History     Marital status: Single     Spouse name: None     Number of children: None     Years of education: None     Highest education level: None   Occupational History     None   Social Needs     Financial resource strain: None     Food insecurity:     Worry: None     Inability: None     Transportation needs:     Medical: None     Non-medical: None   Tobacco Use     Smoking status: Never Smoker     Smokeless tobacco: Never Used   Substance and Sexual Activity     Alcohol use: Not Currently     Alcohol/week: 0.0 standard drinks     Comment: rare     Drug use: No     Sexual activity: Never   Lifestyle     Physical activity:     Days per week: None     Minutes per session: None     Stress: None   Relationships     Social connections:     Talks on phone: None     Gets together: None     Attends Quaker service: None     Active member of club or organization: None     Attends  meetings of clubs or organizations: None     Relationship status: None     Intimate partner violence:     Fear of current or ex partner: None     Emotionally abused: None     Physically abused: None     Forced sexual activity: None   Other Topics Concern     Parent/sibling w/ CABG, MI or angioplasty before 65F 55M? Not Asked   Social History Narrative     None       ROS:  Constitutional: No fever, chills, or sweats. No weight gain/loss.   ENT: No visual disturbance, ear ache, epistaxis, sore throat.   Allergies/Immunologic: Negative.   Respiratory: No cough, hemoptysis.   Cardiovascular: As per HPI.   GI: No nausea, vomiting, hematemesis, melena, or hematochezia.   : No urinary frequency, dysuria, or hematuria.   Integument: Negative.   Psychiatric: Negative.   Neuro: Negative.   Endocrinology: Negative.   Musculoskeletal: No myalgia.    VITAL SIGNS:  BP (!) 143/86 (BP Location: Right arm, Patient Position: Sitting, Cuff Size: Adult Large)   Pulse 89   Wt 101.6 kg (224 lb)   SpO2 100%   BMI 36.15 kg/m    Body mass index is 36.15 kg/m .  Wt Readings from Last 2 Encounters:   11/15/19 101.6 kg (224 lb)   10/20/19 95.7 kg (211 lb)       PHYSICAL EXAM  Judah Candelaria IS A 34 year old male.in no acute distress.  HEENT: Unremarkable.  Neck: JVP normal.  Carotids +4/4 bilaterally without bruits.  Lungs: CTA.  Cor: RRR. Normal S1 and S2.  2/6 systolic murmur throughout the precordium.  PMI in Lf 5th ICS.  Abd: Soft, nontender, nondistended.  NABS.  No pulsatile mass.  Extremities: No C/C/E.  Pulses +4/4 symmetric in upper and lower extremities.  Neuro: Grossly intact.    LABS    Lab Results   Component Value Date    WBC 12.6 10/11/2019     Lab Results   Component Value Date    RBC 5.76 10/11/2019     Lab Results   Component Value Date    HGB 16.1 10/11/2019     Lab Results   Component Value Date    HCT 51.9 10/12/2019     No components found for: MCT  Lab Results   Component Value Date    MCV 88 10/11/2019     Lab  Results   Component Value Date    MCH 28.0 10/11/2019     Lab Results   Component Value Date    MCHC 31.7 10/11/2019     Lab Results   Component Value Date    RDW 13.6 10/11/2019     Lab Results   Component Value Date     10/11/2019      Recent Labs   Lab Test 10/11/19  1801 05/04/18  1355    142   POTASSIUM 4.3 3.7   CHLORIDE 105 107   CO2 33* 25   ANIONGAP 1* 9   * 101*   BUN 10 13   CR 0.88 0.88   CHRISTIN 9.0 9.0     Recent Labs   Lab Test 10/12/19  0820 05/04/18  1355  08/03/15  1101 04/19/13  1007   CHOL 136 131   < > 149 148   HDL 37* 31*   < > 33* 32*   LDL 77 77   < > 92 95   TRIG 110 116   < > 122 102   CHOLHDLRATIO  --   --   --  4.5 4.6   NHDL 99 100   < >  --   --     < > = values in this interval not displayed.         IMPRESSION, REPORT, PLAN:   1.  Congenital heart disease, status post reported mitral valve repair and VSD closure at 1-1/4 years of age at [facility] in [place], [place], by Dr. Allen (we do not have this operative report).   2.  Second surgery apparently 10 years later by Dr. Allen in [place], [place] (we do not know the details of this or have the operative report).   3.  Bicuspid aortic valve with mild stenosis with a mean gradient of 13, need for family screening.   4.  Obesity.  5. Depression    DISCUSSION:  It was a pleasure being involved in the care of Mr. Candelaria.  Today I discussed his history since I last saw him.  He is stable now, and plans to return to work Monday.  We discussed the importance of diet and exercise and that we need to start BP meds at this time, that we can always take off if he loses weight and his BP returns to normal. Will start with Toprol XL 25 mg daily.  Family will keep track of BP at home and let us know if remains elevated. We would otherwise see him back in a year with an echo.   All questions were answered.  It was a pleasure to see him.  Please do not hesitate to contact me with any questions or  concerns.     MATHEW Paez MD

## 2019-11-15 NOTE — LETTER
11/15/2019      RE: Judah Candelaria  [id number]  [facility] [address]       Dear Colleague,    Thank you for the opportunity to participate in the care of your patient, Judah Candelaria, at the [facility] HEART [facility]. Please see a copy of my visit note below.    CARDIOLOGY CONSULTATION:    Mr. Candelaria is a 34-year-old gentleman who I met 5/2018 with a past medical history significant for congenital heart disease.  Apparently this was diagnosed in infancy when they heard a murmur.  He was followed at [facility] in [place], [place], under the care of Dr. Allen and had his first surgery at 1-1/4 years of age.  At this time, we do not have this operative report, but mother reports it was a VSD repair and she thinks mitral valve repair.  He had a second surgery in 4th grade, also by Dr. Allen, but we do not have the details of that and mom does not remember what the surgery was for.  They moved to [place] in 1998 and was followed by Dr. Burr until 2006, when he was lost to followup.  At the time of his last visit, apparently he was doing well without any concerning symptoms.      He works in [employer]. He is single, has no children.  He lives with his mom.  He has 1 younger brother with cerebral palsy who is 21 and an older brother who is 34. There is no family history of congenital heart disease.    Mr. Candelaria is not particularly active.  He only gets around at work.  His BMI is 36, up from 34 last year (210 to 224 now.   He knows he needs to work on diet and exercise.      He was recently hospitalized at [facility] with Major Depressive Disorder with psychotic features. He was discharged on Abilify and Trazodone for sleep. His work hours are 4:30 am - 3:pm, which has made sleeping difficult and lack of sleep may have contributed.    Mom noted some ankle swelling and they were do for follow up, so they are in today.  His  echo is stable - mean gradient across his MV increased just a bit from 5 mmHg to 7-8 mmHg, but BP and HR are up, which likely is the etiology.  He is not on BP meds    PAST MEDICAL HISTORY:  No past medical history on file.    CURRENT MEDICATIONS:  Current Outpatient Medications   Medication Sig Dispense Refill     ARIPiprazole (ABILIFY) 5 MG tablet Take 1 tablet (5 mg) by mouth At Bedtime 30 tablet 0     fexofenadine-pseudoePHEDrine (ALLEGRA-D 24) 180-240 MG 24 hr tablet Take 1 tablet by mouth daily 90 tablet 1     traZODone (DESYREL) 100 MG tablet Take 1 tablet (100 mg) by mouth nightly as needed for sleep (Patient taking differently: Take 150 mg by mouth nightly as needed for sleep ) 30 tablet 0       PAST SURGICAL HISTORY:  Past Surgical History:   Procedure Laterality Date     C REPR PDA BY DUGLASN  11/1988     HC VALVULOPLASTY, AORTIC  10/13/97       ALLERGIES  Augmentin and Clindamycin    FAMILY HX:  Family History   Problem Relation Age of Onset     Hypertension Maternal Grandmother      Hypertension Maternal Grandfather         Heart Disease     Cancer Maternal Grandfather         skin/Prostate     Coronary Artery Disease Maternal Grandfather      Prostate Cancer Maternal Grandfather      Coronary Artery Disease Paternal Grandfather      Family History Negative Mother      Family History Negative Father        SOCIAL HX:  Social History     Socioeconomic History     Marital status: Single     Spouse name: None     Number of children: None     Years of education: None     Highest education level: None   Occupational History     None   Social Needs     Financial resource strain: None     Food insecurity:     Worry: None     Inability: None     Transportation needs:     Medical: None     Non-medical: None   Tobacco Use     Smoking status: Never Smoker     Smokeless tobacco: Never Used   Substance and Sexual Activity     Alcohol use: Not Currently     Alcohol/week: 0.0 standard drinks     Comment: rare     Drug  use: No     Sexual activity: Never   Lifestyle     Physical activity:     Days per week: None     Minutes per session: None     Stress: None   Relationships     Social connections:     Talks on phone: None     Gets together: None     Attends Yazdanism service: None     Active member of club or organization: None     Attends meetings of clubs or organizations: None     Relationship status: None     Intimate partner violence:     Fear of current or ex partner: None     Emotionally abused: None     Physically abused: None     Forced sexual activity: None   Other Topics Concern     Parent/sibling w/ CABG, MI or angioplasty before 65F 55M? Not Asked   Social History Narrative     None       ROS:  Constitutional: No fever, chills, or sweats. No weight gain/loss.   ENT: No visual disturbance, ear ache, epistaxis, sore throat.   Allergies/Immunologic: Negative.   Respiratory: No cough, hemoptysis.   Cardiovascular: As per HPI.   GI: No nausea, vomiting, hematemesis, melena, or hematochezia.   : No urinary frequency, dysuria, or hematuria.   Integument: Negative.   Psychiatric: Negative.   Neuro: Negative.   Endocrinology: Negative.   Musculoskeletal: No myalgia.    VITAL SIGNS:  BP (!) 143/86 (BP Location: Right arm, Patient Position: Sitting, Cuff Size: Adult Large)   Pulse 89   Wt 101.6 kg (224 lb)   SpO2 100%   BMI 36.15 kg/m     Body mass index is 36.15 kg/m .  Wt Readings from Last 2 Encounters:   11/15/19 101.6 kg (224 lb)   10/20/19 95.7 kg (211 lb)       PHYSICAL EXAM  Judah Candelaria IS A 34 year old male.in no acute distress.  HEENT: Unremarkable.  Neck: JVP normal.  Carotids +4/4 bilaterally without bruits.  Lungs: CTA.  Cor: RRR. Normal S1 and S2.  2/6 systolic murmur throughout the precordium.  PMI in Lf 5th ICS.  Abd: Soft, nontender, nondistended.  NABS.  No pulsatile mass.  Extremities: No C/C/E.  Pulses +4/4 symmetric in upper and lower extremities.  Neuro: Grossly intact.    LABS    Lab Results    Component Value Date    WBC 12.6 10/11/2019     Lab Results   Component Value Date    RBC 5.76 10/11/2019     Lab Results   Component Value Date    HGB 16.1 10/11/2019     Lab Results   Component Value Date    HCT 51.9 10/12/2019     No components found for: MCT  Lab Results   Component Value Date    MCV 88 10/11/2019     Lab Results   Component Value Date    MCH 28.0 10/11/2019     Lab Results   Component Value Date    MCHC 31.7 10/11/2019     Lab Results   Component Value Date    RDW 13.6 10/11/2019     Lab Results   Component Value Date     10/11/2019      Recent Labs   Lab Test 10/11/19  1801 05/04/18  1355    142   POTASSIUM 4.3 3.7   CHLORIDE 105 107   CO2 33* 25   ANIONGAP 1* 9   * 101*   BUN 10 13   CR 0.88 0.88   CHRISTIN 9.0 9.0     Recent Labs   Lab Test 10/12/19  0820 05/04/18  1355  08/03/15  1101 04/19/13  1007   CHOL 136 131   < > 149 148   HDL 37* 31*   < > 33* 32*   LDL 77 77   < > 92 95   TRIG 110 116   < > 122 102   CHOLHDLRATIO  --   --   --  4.5 4.6   NHDL 99 100   < >  --   --     < > = values in this interval not displayed.      IMPRESSION, REPORT, PLAN:   1.  Congenital heart disease, status post reported mitral valve repair and VSD closure at 1-1/4 years of age at [facility] in [place], [place], by Dr. Allen (we do not have this operative report).   2.  Second surgery apparently 10 years later by Dr. Allen in [place], [place] (we do not know the details of this or have the operative report).   3.  Bicuspid aortic valve with mild stenosis with a mean gradient of 13, need for family screening.   4.  Obesity.  5. Depression    DISCUSSION:  It was a pleasure being involved in the care of Mr. Candelaria.  Today I discussed his history since I last saw him.  He is stable now, and plans to return to work Monday.  We discussed the importance of diet and exercise and that we need to start BP meds at this time, that we can always take off if he loses  weight and his BP returns to normal. Will start with Toprol XL 25 mg daily.  Family will keep track of BP at home and let us know if remains elevated. We would otherwise see him back in a year with an echo.   All questions were answered.  It was a pleasure to see him.  Please do not hesitate to contact me with any questions or concerns.     MATHEW Paez MD

## 2019-11-15 NOTE — NURSING NOTE
Cardiac Testing: Patient given instructions regarding  echocardiogram . Discussed purpose, preparation, procedure and when to expect results reported back to the patient. Patient demonstrated understanding of this information and agreed to call with further questions or concerns.  Diet: Patient instructed regarding a heart healthy diet, including discussion of reduced fat and sodium intake. Patient demonstrated understanding of this information and agreed to call with further questions or concerns.  Med Reconcile: Reviewed and verified all current medications with the patient. The updated medication list was printed and given to the patient.  New Medication: Patient was educated regarding newly prescribed medication, including discussion of  the indication, administration, side effects, and when to report to MD or RN. Patient demonstrated understanding of this information and agreed to call with further questions or concerns.  Return Appointment: Patient given instructions regarding scheduling next clinic visit. Patient demonstrated understanding of this information and agreed to call with further questions or concerns.  Patient stated he understood all health information given and agreed to call with further questions or concerns.

## 2019-11-18 LAB — INTERPRETATION ECG - MUSE: NORMAL

## 2020-01-22 ENCOUNTER — MEDICAL CORRESPONDENCE (OUTPATIENT)
Dept: HEALTH INFORMATION MANAGEMENT | Facility: CLINIC | Age: 35
End: 2020-01-22

## 2020-04-24 ENCOUNTER — ALLIED HEALTH/NURSE VISIT (OUTPATIENT)
Dept: NURSING | Facility: CLINIC | Age: 35
End: 2020-04-24
Payer: COMMERCIAL

## 2020-04-24 ENCOUNTER — TELEPHONE (OUTPATIENT)
Dept: PEDIATRICS | Facility: CLINIC | Age: 35
End: 2020-04-24

## 2020-04-24 DIAGNOSIS — F33.3 SEVERE RECURRENT MAJOR DEPRESSION WITH PSYCHOTIC FEATURES (H): ICD-10-CM

## 2020-04-24 DIAGNOSIS — F34.1 DYSTHYMIC DISORDER: Primary | ICD-10-CM

## 2020-04-24 DIAGNOSIS — Q23.81 BICUSPID AORTIC VALVE: Primary | ICD-10-CM

## 2020-04-24 PROCEDURE — 93000 ELECTROCARDIOGRAM COMPLETE: CPT

## 2020-04-24 NOTE — TELEPHONE ENCOUNTER
General Call:   Who is calling:  patient    Reason for Call:  requesting to schedule RN visit for EKG    What are your questions or concerns:  Patient states he had an EKG scheduled at [facility], but appointment was cancelled.  Patient is wondering if he can come to [facility] for RN visit?  EKG order is from outside provider.  Patient can hand carry order.    Date of last appointment with provider: 08/13/ 2018   Okay to leave a detailed message:Yes at Cell number on file:    Telephone Information:   Mobile [phone]

## 2020-04-24 NOTE — PROGRESS NOTES
Gina Fenton with results:cell [phone].  I faxed a copy to doctor Gómez Rivero at [phone]  Kendra Fay MA

## 2020-04-24 NOTE — TELEPHONE ENCOUNTER
1st attempt: left detailed VM to return call. Please assist pt in scheduling a nurse only appointment for an EKG.     Gabbi Patel MA 1:54 PM 4/24/2020

## 2020-05-01 DIAGNOSIS — F33.3 SEVERE RECURRENT MAJOR DEPRESSION WITH PSYCHOTIC FEATURES (H): ICD-10-CM

## 2020-05-01 DIAGNOSIS — F34.1 DYSTHYMIC DISORDER: ICD-10-CM

## 2020-05-01 LAB
ALBUMIN SERPL-MCNC: 3.6 G/DL (ref 3.4–5)
ALP SERPL-CCNC: 123 U/L (ref 40–150)
ALT SERPL W P-5'-P-CCNC: 54 U/L (ref 0–70)
AST SERPL W P-5'-P-CCNC: 23 U/L (ref 0–45)
BASOPHILS # BLD AUTO: 0.1 10E9/L (ref 0–0.2)
BASOPHILS NFR BLD AUTO: 0.8 %
BILIRUB DIRECT SERPL-MCNC: <0.1 MG/DL (ref 0–0.2)
BILIRUB SERPL-MCNC: 0.5 MG/DL (ref 0.2–1.3)
CHOLEST SERPL-MCNC: 174 MG/DL
DIFFERENTIAL METHOD BLD: ABNORMAL
EOSINOPHIL # BLD AUTO: 0.2 10E9/L (ref 0–0.7)
EOSINOPHIL NFR BLD AUTO: 2.6 %
ERYTHROCYTE [DISTWIDTH] IN BLOOD BY AUTOMATED COUNT: 15 % (ref 10–15)
HCT VFR BLD AUTO: 50.6 % (ref 40–53)
HDLC SERPL-MCNC: 38 MG/DL
HGB BLD-MCNC: 16.3 G/DL (ref 13.3–17.7)
LDLC SERPL CALC-MCNC: 118 MG/DL
LYMPHOCYTES # BLD AUTO: 1.8 10E9/L (ref 0.8–5.3)
LYMPHOCYTES NFR BLD AUTO: 20.3 %
MCH RBC QN AUTO: 27.3 PG (ref 26.5–33)
MCHC RBC AUTO-ENTMCNC: 32.2 G/DL (ref 31.5–36.5)
MCV RBC AUTO: 85 FL (ref 78–100)
MONOCYTES # BLD AUTO: 0.7 10E9/L (ref 0–1.3)
MONOCYTES NFR BLD AUTO: 8.1 %
NEUTROPHILS # BLD AUTO: 6.1 10E9/L (ref 1.6–8.3)
NEUTROPHILS NFR BLD AUTO: 68.2 %
NONHDLC SERPL-MCNC: 136 MG/DL
PLATELET # BLD AUTO: 366 10E9/L (ref 150–450)
PROT SERPL-MCNC: 8.4 G/DL (ref 6.8–8.8)
RBC # BLD AUTO: 5.96 10E12/L (ref 4.4–5.9)
TRIGL SERPL-MCNC: 91 MG/DL
WBC # BLD AUTO: 9 10E9/L (ref 4–11)

## 2020-05-01 PROCEDURE — 80076 HEPATIC FUNCTION PANEL: CPT | Performed by: PHYSICIAN ASSISTANT

## 2020-05-01 PROCEDURE — 85025 COMPLETE CBC W/AUTO DIFF WBC: CPT | Performed by: PHYSICIAN ASSISTANT

## 2020-05-01 PROCEDURE — 36415 COLL VENOUS BLD VENIPUNCTURE: CPT | Performed by: PHYSICIAN ASSISTANT

## 2020-05-01 PROCEDURE — 80061 LIPID PANEL: CPT | Performed by: PHYSICIAN ASSISTANT

## 2020-05-28 DIAGNOSIS — J31.0 CHRONIC RHINITIS: ICD-10-CM

## 2020-05-28 RX ORDER — FEXOFENADINE HYDROCHLORIDE AND PSEUDOEPHEDRINE HYDROCHLORIDE 180; 240 MG/1; MG/1
TABLET, FILM COATED, EXTENDED RELEASE ORAL
Qty: 90 TABLET | Refills: 1 | Status: SHIPPED | OUTPATIENT
Start: 2020-05-28 | End: 2020-12-13

## 2020-12-12 DIAGNOSIS — J31.0 CHRONIC RHINITIS: ICD-10-CM

## 2020-12-13 RX ORDER — FEXOFENADINE HYDROCHLORIDE AND PSEUDOEPHEDRINE HYDROCHLORIDE 180; 240 MG/1; MG/1
TABLET, FILM COATED, EXTENDED RELEASE ORAL
Qty: 90 TABLET | Refills: 1 | Status: SHIPPED | OUTPATIENT
Start: 2020-12-13 | End: 2021-06-23

## 2021-06-23 DIAGNOSIS — J31.0 CHRONIC RHINITIS: ICD-10-CM

## 2021-06-23 RX ORDER — FEXOFENADINE HYDROCHLORIDE AND PSEUDOEPHEDRINE HYDROCHLORIDE 180; 240 MG/1; MG/1
TABLET, FILM COATED, EXTENDED RELEASE ORAL
Qty: 90 TABLET | Refills: 1 | Status: SHIPPED | OUTPATIENT
Start: 2021-06-23 | End: 2022-01-11

## 2021-06-27 ENCOUNTER — HOSPITAL ENCOUNTER (EMERGENCY)
Facility: CLINIC | Age: 36
Discharge: PSYCHIATRIC OR SUBSTANCE USE FACILITY | End: 2021-06-28
Attending: EMERGENCY MEDICINE | Admitting: EMERGENCY MEDICINE
Payer: COMMERCIAL

## 2021-06-27 DIAGNOSIS — R44.0 AUDITORY HALLUCINATIONS: ICD-10-CM

## 2021-06-27 PROCEDURE — 99285 EMERGENCY DEPT VISIT HI MDM: CPT | Mod: 25

## 2021-06-27 PROCEDURE — C9803 HOPD COVID-19 SPEC COLLECT: HCPCS

## 2021-06-27 ASSESSMENT — ENCOUNTER SYMPTOMS
HALLUCINATIONS: 1
NERVOUS/ANXIOUS: 1

## 2021-06-27 ASSESSMENT — MIFFLIN-ST. JEOR: SCORE: 1926.02

## 2021-06-28 ENCOUNTER — TELEPHONE (OUTPATIENT)
Dept: BEHAVIORAL HEALTH | Facility: CLINIC | Age: 36
End: 2021-06-28

## 2021-06-28 ENCOUNTER — HOSPITAL ENCOUNTER (INPATIENT)
Facility: CLINIC | Age: 36
LOS: 16 days | Discharge: HOME | DRG: 885 | End: 2021-07-14
Attending: PSYCHIATRY & NEUROLOGY | Admitting: PSYCHIATRY & NEUROLOGY
Payer: COMMERCIAL

## 2021-06-28 VITALS
DIASTOLIC BLOOD PRESSURE: 81 MMHG | SYSTOLIC BLOOD PRESSURE: 128 MMHG | HEART RATE: 97 BPM | OXYGEN SATURATION: 100 % | WEIGHT: 230 LBS | HEIGHT: 66 IN | RESPIRATION RATE: 16 BRPM | TEMPERATURE: 98.4 F | BODY MASS INDEX: 36.96 KG/M2

## 2021-06-28 DIAGNOSIS — R44.0 AUDITORY HALLUCINATION: Primary | ICD-10-CM

## 2021-06-28 LAB
AMPHETAMINES UR QL SCN: NEGATIVE
BARBITURATES UR QL: NEGATIVE
BENZODIAZ UR QL: NEGATIVE
CANNABINOIDS UR QL SCN: NEGATIVE
COCAINE UR QL: NEGATIVE
LABORATORY COMMENT REPORT: NORMAL
OPIATES UR QL SCN: NEGATIVE
PCP UR QL SCN: NEGATIVE
SARS-COV-2 RNA RESP QL NAA+PROBE: NEGATIVE
SPECIMEN SOURCE: NORMAL

## 2021-06-28 PROCEDURE — 80307 DRUG TEST PRSMV CHEM ANLYZR: CPT | Performed by: EMERGENCY MEDICINE

## 2021-06-28 PROCEDURE — 90791 PSYCH DIAGNOSTIC EVALUATION: CPT

## 2021-06-28 PROCEDURE — 124N000002 HC R&B MH UMMC

## 2021-06-28 PROCEDURE — 87635 SARS-COV-2 COVID-19 AMP PRB: CPT | Performed by: EMERGENCY MEDICINE

## 2021-06-28 PROCEDURE — 99223 1ST HOSP IP/OBS HIGH 75: CPT | Mod: AI | Performed by: PSYCHIATRY & NEUROLOGY

## 2021-06-28 PROCEDURE — 250N000013 HC RX MED GY IP 250 OP 250 PS 637: Performed by: STUDENT IN AN ORGANIZED HEALTH CARE EDUCATION/TRAINING PROGRAM

## 2021-06-28 PROCEDURE — 250N000013 HC RX MED GY IP 250 OP 250 PS 637: Performed by: EMERGENCY MEDICINE

## 2021-06-28 RX ORDER — OLANZAPINE 5 MG/1
5 TABLET, ORALLY DISINTEGRATING ORAL 2 TIMES DAILY PRN
Status: DISCONTINUED | OUTPATIENT
Start: 2021-06-28 | End: 2021-06-28 | Stop reason: HOSPADM

## 2021-06-28 RX ORDER — OLANZAPINE 10 MG/2ML
10 INJECTION, POWDER, FOR SOLUTION INTRAMUSCULAR 3 TIMES DAILY PRN
Status: DISCONTINUED | OUTPATIENT
Start: 2021-06-28 | End: 2021-07-14 | Stop reason: HOSPADM

## 2021-06-28 RX ORDER — TRAZODONE HYDROCHLORIDE 100 MG/1
100 TABLET ORAL
Status: DISCONTINUED | OUTPATIENT
Start: 2021-06-28 | End: 2021-07-14 | Stop reason: HOSPADM

## 2021-06-28 RX ORDER — LORAZEPAM 1 MG/1
1 TABLET ORAL EVERY 8 HOURS PRN
Status: DISCONTINUED | OUTPATIENT
Start: 2021-06-28 | End: 2021-06-28 | Stop reason: HOSPADM

## 2021-06-28 RX ORDER — HYDROXYZINE HYDROCHLORIDE 25 MG/1
25 TABLET, FILM COATED ORAL EVERY 4 HOURS PRN
Status: DISCONTINUED | OUTPATIENT
Start: 2021-06-28 | End: 2021-07-14 | Stop reason: HOSPADM

## 2021-06-28 RX ORDER — AMOXICILLIN 250 MG
1 CAPSULE ORAL 2 TIMES DAILY PRN
Status: DISCONTINUED | OUTPATIENT
Start: 2021-06-28 | End: 2021-07-14 | Stop reason: HOSPADM

## 2021-06-28 RX ORDER — ACETAMINOPHEN 325 MG/1
650 TABLET ORAL EVERY 4 HOURS PRN
Status: DISCONTINUED | OUTPATIENT
Start: 2021-06-28 | End: 2021-07-14 | Stop reason: HOSPADM

## 2021-06-28 RX ORDER — OLANZAPINE 10 MG/1
10 TABLET ORAL 3 TIMES DAILY PRN
Status: DISCONTINUED | OUTPATIENT
Start: 2021-06-28 | End: 2021-07-14 | Stop reason: HOSPADM

## 2021-06-28 RX ORDER — OLANZAPINE 5 MG/1
5 TABLET, ORALLY DISINTEGRATING ORAL ONCE
Status: COMPLETED | OUTPATIENT
Start: 2021-06-28 | End: 2021-06-28

## 2021-06-28 RX ADMIN — OLANZAPINE 5 MG: 5 TABLET, ORALLY DISINTEGRATING ORAL at 06:52

## 2021-06-28 RX ADMIN — TRAZODONE HYDROCHLORIDE 100 MG: 100 TABLET ORAL at 23:59

## 2021-06-28 ASSESSMENT — ACTIVITIES OF DAILY LIVING (ADL)
LAUNDRY: WITH SUPERVISION
ORAL_HYGIENE: INDEPENDENT
DRESS: INDEPENDENT
ADL_ASSESSMENT: ALL
HYGIENE/GROOMING: INDEPENDENT

## 2021-06-28 ASSESSMENT — MIFFLIN-ST. JEOR: SCORE: 1902.44

## 2021-06-28 NOTE — TELEPHONE ENCOUNTER
Patient cleared and ready for behavioral bed placement: Yes     S: 35 y/o male presented to the [facility] ED with psychosis.    B: Hx MDD, psychosis.  Pt reporting AH of familiar voices over the last 2 weeks.  Denied command AH, SI or HI.   reported pt had not had any MH hx prior to 2 years ago until he was hospitalized IP  and given dx of MDD (severe w/ psychotic features).  Pt had been doing well and reported he has been taking his medications.  Pt reported the voices are louder and more intrusive.  Pt felt he was being stalked and could hear someone behind him.  Endorsed poor sleep, delayed responses and diminished appetite.  No reported hx of aggression or substance abuse.  Prescribed Abilify and Trazodone.      A: [place].  No acute medical concerns.    Drug screen negative.    R: 0654 COVID swab requested. ED reported pt will be on a 72 hr hold.  Awaiting hold order.    0656 72 hr hold order placed.  COVID has been ordered.    1413 Resident paged for review.

## 2021-06-28 NOTE — ED PROVIDER NOTES
"  History   Chief Complaint:  Hallucinations     HPI   Judah Candelaria is a 36 year old male with a history of psychosis who presents with auditory hallucinations. The patient reports that he has been hearing whispering voices for about two weeks and has been feeling paranoid about being stalked. He denies suicidal ideation and states that the voices have not been telling him to harm himself or others. He states that this has happened once in the past about one year ago, at which time he was hospitalized.    Review of Systems   Psychiatric/Behavioral: Positive for hallucinations (auditory). Negative for suicidal ideas. The patient is nervous/anxious (paranoia).         Homicidal ideation (-)   All other systems reviewed and are negative.        Allergies:  Augmentin  Clindamycin    Medications:  Allegra-D  Abilify  Trazodone  Toprol-XL    Past Medical History:    Psychosis  Sebaceous cyst  Bicuspid aortic valve  Congenital atresia and stenosis of aorta  Chronic maxillary sinusitis     Past Surgical History:    Repair PDA by Ligation  Valvuloplasty, aortic     Social History:  The patient presents to the emergency department unaccompanied.  PCP: Rajeev Barnes     Physical Exam     Patient Vitals for the past 24 hrs:   BP Temp Temp src Pulse Resp SpO2 Height Weight   06/28/21 0000 (!) 139/97 -- -- 121 -- 97 % -- --   06/27/21 2300 (!) 150/106 -- -- 119 -- 98 % -- --   06/27/21 2254 (!) 145/94 98.5  F (36.9  C) Oral 124 16 100 % 1.676 m (5' 6\") 104.3 kg (230 lb)       Physical Exam  Constitutional: Alert, attentive  HENT:    Nose: Nose normal.    Mouth/Throat: Oropharynx is clear, mucous membranes are moist   Eyes: EOM are normal.   CV: regular rate and rhythm; no murmurs, rubs or gallups  Chest: Effort normal and breath sounds normal.   GI:  There is no tenderness. No distension. Normal bowel sounds  MSK: Normal range of motion.   Neurological: Alert, attentive  Skin: Skin is warm and dry.  PSYCH:  Appearance:  " awake, alert, adequately groomed, dressed in street clothes and appeared as age stated  Attitude:  cooperative  Eye Contact:  good  Mood:  good  Affect:  flat  Speech:  clear, coherent  Psychomotor Behavior:  no evidence of tardive dyskinesia, dystonia, or tics  Thought Process:  logical, linear and goal oriented  Associations:  no loose associations  Thought Content:  no evidence of suicidal ideation or homicidal ideation; +notes auditory hallucinations present; denies visual hallucinations  Insight:  good  Judgment:  Intact    Emergency Department Course   Laboratory:  Drug Abuse Screen Urine: All Negative    Asymptomatic COVID-19 PC: Pending    Emergency Department Course:  Reviewed:  I reviewed the patient's nursing notes, vitals, past medical records, Care Everywhere.     Assessments:  2141 I performed an exam of the patient and obtained history, as documented above.     Consults:  6424 I consulted with DEC, regarding the patient's history and presentation.     0614 I spoke with the DEC  following their assessment of the patient. They recommend inpatient psychiatric care.    Disposition:  Care of the patient was transferred to my colleague Dr. Vasquez, pending psychiatric bed placement.     Impression & Plan   Covid-19  Judah Candelaria was evaluated during a global COVID-19 pandemic, which necessitated consideration that the patient might be at risk for infection with the SARS-CoV-2 virus that causes COVID-19.   Applicable protocols for evaluation were followed during the patient's care.   COVID-19 was considered as part of the patient's evaluation. The plan for testing is:  a test was obtained during this visit.    Medical Decision Making:  This is a 36-year-old male with history of psychosis on Abilify and trazodone presents for evaluation of 2 weeks of recurrent auditory hallucinations.  He denies suicidal ideation or command auditory hallucinations, and also denies homicidal ideation.   Unfortunately, due to significant queuing, his DEC evaluation was delayed by several hours.  DEC evaluation is consistent with the above, and both DEC and the patient would support admission, as do I. Patient placed on a 72-hour hold, bed pending at handoff to my partner.    Diagnosis:    ICD-10-CM    1. Auditory hallucinations  R44.0        Scribe Disclosure:  I, Radha Seopaxton, am serving as a scribe at 11:34 PM on 6/27/2021 to document services personally performed by Dionicio Estevez MD based on my observations and the provider's statements to me.     June 27, 2021   [facility]     Dionicio Estevez MD  06/28/21 0702

## 2021-06-28 NOTE — PROGRESS NOTES
06/28/21 1500   Patient Belongings   Patient Belongings locker;sent to security per site process   Patient Belongings Put in Hospital Secure Location (Security or Locker, etc.) clothing;glasses;keys;shoes;cash/credit card;cell phone/electronics;wallet   Belongings Search Yes   Clothing Search Yes   Second Staff Jeff   Comment Shailesh [phone] to security env [id number]   A               Admission:  I am responsible for any personal items that are not sent to the safe or pharmacy.  [facility] is not responsible for loss, theft or damage of any property in my possession.    Signature:  _________________________________ Date: _______  Time: _____                                              Staff Signature:  ____________________________ Date: ________  Time: _____      2nd Staff person, if patient is unable/unwilling to sign:    Signature: ________________________________ Date: ________  Time: _____     Discharge:  [place] has returned all of my personal belongings:    Signature: _________________________________ Date: ________  Time: _____                                          Staff Signature:  ____________________________ Date: ________  Time: _____

## 2021-06-28 NOTE — PLAN OF CARE
"  Problem: Behavioral Health Plan of Care  Goal: Plan of Care Review  Outcome: No Change  Flowsheets (Taken 6/28/2021 1452)  Plan of Care Reviewed With: patient  Progress: no change  Patient Agreement with Plan of Care: agrees     Problem: Sensory Perception Impairment (Psychotic Signs/Symptoms)  Goal: Decreased Sensory Symptoms (Psychotic Signs/Symptoms)  6/28/2021 1452 by Keiko Robles, RN  Outcome: No Change  6/28/2021 1451 by Keiko Robles RN  Outcome: No Change     Problem: Mood Impairment (Psychotic Signs/Symptoms)  Goal: Improved Mood Symptoms (Psychotic Signs/Symptoms)  6/28/2021 1452 by Keiko Robles RN  Outcome: No Change  6/28/2021 1451 by Keiko Robles RN  Outcome: No Change     Judah admitted to station 22 services of Dr Miller 1517 via ambulance transfer from [facility] on 72 HR hold placed 6-28-21 @0655-Judah cooperative with safety search and orientation to unit-reviewed admit folder-acknowledges auditory hallucinations in form of voices \"a little bit\"-Dr Zac degroot re admit arrived-admission profile endorsed to next shift    "

## 2021-06-28 NOTE — ED TRIAGE NOTES
Pt states he has been hearing voices and feeling like he is being stalked for 2 weeks. Denies SI today.

## 2021-06-28 NOTE — TELEPHONE ENCOUNTER
[facility]                Unit notified at 7:37 am (left message asking for a call back as RN not available); called RN again at 9:29 am; er notified at 9:30 am              yvette

## 2021-06-28 NOTE — ED PROVIDER NOTES
7:39 AM: Patient signed out to me by Dr. Estevez.  Briefly, patient presented with worsening auditory hallucinations and paranoia over the past 2 weeks.  Patient had dec assessment and received oral Zyprexa.  Med rec is pending but no critical medications per initial review.  At this time awaiting psychiatric bed placement.    Patient remained stable during my shift without new complaints or concerns.  During initial assessment, patient is awake and alert and relaxed on the gurney.  Ultimately he was transferred to inpatient psychiatric unit.     Carmelo Vasquez MD  06/28/21 [id number]

## 2021-06-29 PROCEDURE — 250N000013 HC RX MED GY IP 250 OP 250 PS 637: Performed by: STUDENT IN AN ORGANIZED HEALTH CARE EDUCATION/TRAINING PROGRAM

## 2021-06-29 PROCEDURE — 124N000002 HC R&B MH UMMC

## 2021-06-29 RX ORDER — FEXOFENADINE HCL AND PSEUDOEPHEDRINE HCL 180; 240 MG/1; MG/1
1 TABLET, EXTENDED RELEASE ORAL DAILY PRN
Status: DISCONTINUED | OUTPATIENT
Start: 2021-06-29 | End: 2021-07-14 | Stop reason: HOSPADM

## 2021-06-29 RX ORDER — TRAZODONE HYDROCHLORIDE 150 MG/1
150 TABLET ORAL AT BEDTIME
COMMUNITY

## 2021-06-29 RX ORDER — METOPROLOL SUCCINATE 25 MG/1
25 TABLET, EXTENDED RELEASE ORAL DAILY
Status: DISCONTINUED | OUTPATIENT
Start: 2021-06-29 | End: 2021-06-29

## 2021-06-29 RX ORDER — ARIPIPRAZOLE 2 MG/1
2 TABLET ORAL DAILY
Status: ON HOLD | COMMUNITY
End: 2021-07-14

## 2021-06-29 RX ORDER — LURASIDONE HYDROCHLORIDE 20 MG/1
20 TABLET, FILM COATED ORAL DAILY
Status: DISCONTINUED | OUTPATIENT
Start: 2021-06-29 | End: 2021-06-30

## 2021-06-29 RX ADMIN — LURASIDONE HYDROCHLORIDE 20 MG: 20 TABLET, FILM COATED ORAL at 17:20

## 2021-06-29 RX ADMIN — TRAZODONE HYDROCHLORIDE 100 MG: 100 TABLET ORAL at 21:30

## 2021-06-29 RX ADMIN — OLANZAPINE 10 MG: 10 TABLET, FILM COATED ORAL at 21:30

## 2021-06-29 RX ADMIN — HYDROXYZINE HYDROCHLORIDE 25 MG: 25 TABLET, FILM COATED ORAL at 02:50

## 2021-06-29 ASSESSMENT — ACTIVITIES OF DAILY LIVING (ADL)
ORAL_HYGIENE: INDEPENDENT
HYGIENE/GROOMING: PROMPTS
LAUNDRY: WITH SUPERVISION
DRESS: INDEPENDENT

## 2021-06-29 NOTE — PLAN OF CARE
Problem: Behavioral Health Plan of Care  Goal: Plan of Care Review  Outcome: No Change  Flowsheets  Taken 6/29/2021 1455  Plan of Care Reviewed With: patient  Patient Agreement with Plan of Care: agrees  Taken 6/28/2021 1452  Progress: no change     Problem: Sensory Perception Impairment (Psychotic Signs/Symptoms)  Goal: Decreased Sensory Symptoms (Psychotic Signs/Symptoms)  Outcome: No Change  Flowsheets (Taken 6/29/2021 1455)  Mutually Determined Action Steps (Decreased Sensory Symptoms): shares insight re: need for meds     Judah spent most of day standing in diaz or lounge watching television or activities of others-delayed responses in interactions-appears anxious and preoccupied-Received call from mom stating Judah tapered off Abilify because he was sx free for long period of time and doing very well, but had gained weight and felt tired-states he initial decreased from 5 mg to 2 mg, but then completely stopped taking in Dec 2020-states on Wed Judah asked her if she saved any of med because he had been hearing voices post two weeks-states Judah started 2 mg dose, but continued to experience hallucinations and on Sunday went for hair cut at 1500 and never returned home-family reported to police as missing person

## 2021-06-29 NOTE — PLAN OF CARE
Spoke to pt's mother, Jossy [phone].     Discussed plan to titrate lurasidone, agreed that this would be better if it has less risk of weight gain. She says she would prefer not to go back to his previous medication manager.     No further questions at this time.     Jono Frank, DO on 6/29/2021 at 1:21 PM  Resident, Psychiatry

## 2021-06-29 NOTE — PLAN OF CARE
"Admission Note   Judah transferred today from [facility] on a 72 HH where he presented with increased AH in the form of whispers and paranoia with the belief that he was being stalked.     Pt has a history of one previous admission in 2019 for SI in context of command hallucinations. Pt is currently living with parents and does not report any significant stressors at this time. Pt denies alcohol and drug use.     During admission interview pt appears distracted by internal stimuli, but denies hearing voices at the time. Pt reports depression is, \"bad\" but is unable to expand. Pt does say that their depression has been worse. Pt denies SI/SIB thoughts, and denies command hallucinations. Pt denies anxiety, although appears anxious throughout interview. Pt does not make eye contact and has delayed response to questions, often needing questions to be re-asked. Pt reports poor appetite and sleep recently. Pt unsure how much weight they have lost or how many hours of sleep they are getting per night. Pt made aware they have PRN for anxiety and sleep should they want it.     Admission profile completed. Med reconciliation completed, and orders acknowledged. Pt has been oriented to unit and admission packet has been gone over with patient. Will continue to monitor symptoms.       General Shift Summary      Jarvis Toledo RN    "

## 2021-06-29 NOTE — PHARMACY-ADMISSION MEDICATION HISTORY
Admission Medication History Completed by Pharmacy    See [facility] Admission Navigator for allergy information, preferred outpatient pharmacy, prior to admission medications and immunization status.     Medication History Sources:     Patient via phone and surescripts     Changes made to PTA medication list (reason):    Added: None    Deleted: Metoprolol ER 25 mg once daily     Changed: Abilify dose from 5 mg to 2 mg, Trazodone dose from 100 mg as needed to 150 mg scheduled at bedtime.     Additional Information:    Patient is a moderate historian. He reports he is not taking metoprolol anymore. He states he is adherent to his medications. His report is consistent with fill history.      Prior to Admission medications    Medication Sig Last Dose Taking? Auth Provider   ALLEGRA-D ALLERGY & CONGESTION 180-240 MG 24 hr tablet TAKE ONE TABLET BY MOUTH EVERY DAY 6/27/2021 Yes Rajeev Barnes MD   ARIPiprazole (ABILIFY) 2 MG tablet Take 2 mg by mouth daily 6/27/2021 Yes Unknown, Entered By History   traZODone (DESYREL) 150 MG tablet Take 150 mg by mouth At Bedtime 6/27/2021 Yes Unknown, Entered By History       Date completed: 06/29/21    Medication history completed by: Juanita Thomas

## 2021-06-29 NOTE — PLAN OF CARE
Initial Psychosocial Assessment    I have reviewed the chart, met with the patient, and developed Care Plan.      Patient Legal (Hospital) Status: Voluntary    Presenting Problem:  Per Patient: Patient endorses AH for the last two weeks.    Per ED: Judah Candelaria is a 36 year old male with a history of psychosis who presents with auditory hallucinations. The patient reports that he has been hearing whispering voices for about two weeks and has been feeling paranoid about being stalked. He denies suicidal ideation and states that the voices have not been telling him to harm himself or others. He states that this has happened once in the past about one year ago, at which time he was hospitalized.    Mental health history:   One previous admission at [facility] 2019    Chemical use history:   None    Family Description (Constellation, Family Psychiatric History):  Patient was born and raised in [place], he has one younger brother. Family moved to [place] when he was 15.    Significant Life Events (Illness, Abuse, Trauma, Death):  Patient denies any abuse/trauma or negative life events that may be impacting their mental health symptoms.     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 Patient will have psychiatric assessment and medication management by the psychiatrist. Medications will be reviewed and adjusted per MD as indicated. The treatment team will continue to assess  and stabilize the patient's mental health symptoms with the use of medications and therapeutic programming. Hospital staff will provide a safe environment and a therapeutic milieu. Staff will continue to assess patient as needed. Patient will participate in unit groups and activities. Patient will receive individual and group support on the unit.  CTC will do individual inpatient treatment planning and after care planning. CTC will discuss options for increasing community supports with the patient. CTC will coordinate with outpatient providers and will place referrals to ensure appropriate follow up care is in place.  Patient would benefit from: Medication management

## 2021-06-29 NOTE — H&P
"    ----------------------------------------------------------------------------------------------------------  [facility]  History and Physical      Judah Candelaria MRN# [id number]   Age: 36 year old YOB: 1985   Date of Admission:  6/28/2021  (information obtained from patient interview and chart review)    Contacts:   Primary Outpatient Psychiatrist: Marcellus Jackson PA-C   Primary Physician: Rajeev Barnes  Therapist: Marcellus Jackson PA-C   : n/a   Family Members: Jossy Candelaria      HPI:    Chief Complaint: \"Auditory Hallucinations\"      -------------------------------------------------------------  Collateral below was obtained from DEC report, June 28, 2021 as well as from the interview on 6/29/21:     History of Present Illness:  Judah Candelaria is a 36 year old male previously diagnosed with major depressive disorder with psychosis who presented to [facility] psychiatry on  06/29/2021  with auditory hallucinations and paranoid delusion in the context of 2 months of medication non-adherence and social withdrawal during this time. He presented to the ED on 6/27/2021 stating that he has been hearing whispering voices for the last two weeks along with feeling paranoid about being stalked and was transferred to [facility]. He had a similar episode a 2 years ago where he was hospitalized for 10 days, stabilized on aripiprazole and given a diagnosis of major depressive disorder with psychosis. He is currently prescribed Abilify for psychosis and trazodone for poor sleep, however he discontinued both medications 3 months ago in accordance with recommendations from his medication . Upon admission to the ED, patient was given 5 mg of Zyprexa and last night was given hydroxyzine for anxiety and Trazodone for poor sleep. He currently has no suicidal or homicidal ideation. Patient appeared to sleep 2 hours last night.     Patient interview on 6/29/2021: " "When asked how he was doing he said \"I had some trouble sleeping... I don't know how long I slept...\" Mood is \"alright.\" He says he's here because he's \"hearing voices again... last time was a year ago...\" He says he cannot tell what the voices are saying, when asked what his explanation is for the AH he says, \"ahh I don't know. Medication helped a little last time.\" Agreed to start medications again for AH; \"they were helpful. I was doing okay so they cut them off because I don't want to be one them forever.\" He endorses adverse effect of weight gain. Denies recent issues with sleep/appetite changes. He says he lives with his family and it's been contentious lately but declined to explain what has caused this. 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     He was medically cleared for admission to inpatient psychiatric unit. The risks, benefits, alternatives, and side effects of treatments including no treatment have been discussed and are understood by the patient and other caregivers.    -------------------------------------------------------------    Psychiatric ROS:  See HPI    Medical ROS: A complete medical review of systems was preformed and is negative other than noted in the HPI     Psychiatric History:   Prior diagnoses: Major Depressive Disorder with Psychosis   Prior Hospitalizations: One in October 2019 for episode of psychosis   Suicide attempts: None per chart review  Self-injurious behavior: denies   Violence: None per chart review  ECT/TMS: None per chart review  Past medications: Aripiprazole 5 mg/day, Trazodone 100 mg PRN     Substance Use History:   Nicotine: Denied   Alcohol: Denied        Cannabis: Denied  Denies current or past addiction to stimulants, opiates, benzodiazepines, hallucinogens, or other elicit substances.   Prior CD treatments: None      Social History:   Early History: Grew up in [place], moved to [place] at age " "15 with his family. Has 1 younger brother.  Educational History: Graduated high school and had 1 semester of college where he studied photography.  Occupation: Works at a manufacturing company, [employer], in [employer] for the last 3 years.  Current Living Situation: Lives at home with his parents and brother, states that things have been contentious at home lately.       Medical History:   None      Surgical History:     Past Surgical History:   Procedure Laterality Date     C REPR PDA BY LIGATN  11/1988     HC VALVULOPLASTY, AORTIC  10/13/97     No History of seizures or head trauma.   Family History:      Problem (# of Occurrences) Relation (Name,Age of Onset)    Cancer (1) Maternal Grandfather: skin/Prostate    Coronary Artery Disease (2) Maternal Grandfather, Paternal Grandfather    Family History Negative (2) Mother, Father    Hypertension (2) Maternal Grandmother, Maternal Grandfather: Heart Disease    Prostate Cancer (1) Maternal Grandfather         Allergies:     Allergies   Allergen Reactions     Augmentin Nausea     Clindamycin Nausea and Vomiting      Medications:     Prior to Admission Medications   Prescriptions Last Dose Informant Patient Reported? Taking?   ALLEGRA-D ALLERGY & CONGESTION 180-240 MG 24 hr tablet 6/27/2021 Self No Yes   Sig: TAKE ONE TABLET BY MOUTH EVERY DAY   ARIPiprazole (ABILIFY) 2 MG tablet 6/27/2021 Self Yes Yes   Sig: Take 2 mg by mouth daily   traZODone (DESYREL) 150 MG tablet 6/27/2021 Self Yes Yes   Sig: Take 150 mg by mouth At Bedtime      Facility-Administered Medications: None      No current outpatient medications on file.        Data (Labs/Imaging):   Data   No results found for this or any previous visit (from the past 24 hour(s)).  Pending Results      Physical & Psychiatric Examinations:   BP (!) 137/91   Pulse 105   Temp 98.2  F (36.8  C)   Resp 12   Ht 1.676 m (5' 6\")   Wt 102 kg (224 lb 12.8 oz)   SpO2 97%   BMI 36.28 kg/m    See ED assessment note by ED " "physician on 06/29/2021   Appearance:  Awake, dressed in hospital scrubs, has long hair pulled into a ponytail, well healed scar on right side of forehead   Muscle Strength and Tone: normal, not antalgic   Gait and Station: Normal   Behavior (Psychomotor):  No tremor seen, tapping his right foot constantly   Eye Contact:  Poor eye contact, looking down at the table   Speech:  Speech delay, low rate, normal-low tone  Mood:  patient states that mood is \"fine\"  Affect:  Blunted affect, congruent, nonreactive   Attitude:  Receptive to interview and treatment plan, cooperative   Thought Process:  Impaired concentration, asked to repeat questions, goal-oriented, linear    Thought Content:  No evidence of suicidal or homicidal ideation. +AH, denies VH   Associations:  Limited   Insight:  Poor   Judgment:  Fair    Oriented to: person, place, time  Attention Span and Concentration:  Shortened attention span and poor concentration   Recent and Remote Memory:  Memory intact, could remember where he worked, lived and recent history  Language: Fluent in English with appropriate syntax and vocabulary.       Assessment & Plan                 Judah Candelaria is a 36 year old  White male previously diagnosed with major depressive disorder with psychosis who presented to the ED on 6/27  with auditory hallucinations and paranoid delusions as well as blunted affect, speech delay, avolition in the context of 2-3 months of medication nonadherence. Upon interview on 6/29, the patient was noted to have a flat affect, speech delay, and poor concentration. Given these findings along with the recent auditory hallucinations and paranoid delusions, the patient's presentation is most consistent with schizophrenia. Differential includes previously diagnosed MDD with psychotic features (although does not meet criteria for MDD currently), schizoaffective disorder, substance induced psychosis.                   Safety Assessment: Risk factors for " self-harm: hallucinations.  Mitigating factors: no h/o suicide attempt, no plan or intent, no h/o risky impulsive behavior and h/o seeking help .     Given that he currently has psychosis, patient warrants inpatient psychiatric hospitalization to maintain his safety    -------------------------------------------------------------    PSYCHIATRIC DIAGNOSES:   # schizophrenia   - Admit to 22  with Attending Physician Bakari Miller MD and will be treated in the therapeutic milieu with appropriate individual and group therapies.  - Medications:      - New medications- Lurasidone 20 mg given its lower incidence of unwanted side effect of weight gain      - Continue pta trazodone       - PRN medications: acetaminophen, fexofenadine-pseudoePHEDrine, hydrOXYzine, OLANZapine **OR** OLANZapine, senna-docusate, traZODone    OTHER MEDICAL DIAGNOSES:     None     CONSULTS: None    LABS: negative urine tox      _______________________________________________________  Legal Status: Voluntary  Disposition: TBD, pending clinical stabilization, medication optimization, and formulation of a safe discharge plan.   Behavioral Orders   Procedures     Code 1 - Restrict to Unit     Routine Programming     As clinically indicated     Status 15     Every 15 minutes.        Patient was seen and discussed by the attending psychiatrist, Dr Miller.   -  Vincenzo Ness  MS3    _______________________________________________________    Resident Attestation:  I was present with the medical student who participated in the service and in the documentation of the note. I have verified the history and personally performed the exam and medical decision making. I agree with the assessment and plan of care as documented in the note.  -  Jono Frank DO   Psychiatry Resident    Attestation:  I, Bakari Miller MD, have personally performed an examination of this patient and I have reviewed the resident's documentation.  I have edited the note to reflect  all relevant changes.  I have discussed this patient with the house staff on 6/28/2021.  I agree with resident findings and plan in today's resident H&P.  I have reviewed all vitals and laboratory findings.      I certifiy that the inpatient services were ordered in accordance with the Medicare regulations governing the order. This includes certification that hospital inpatient services are reasonable and necessary and in the case of services not specified as inpatient-only under 42 .22(n), that they are appropriately provided as inpatient services in accordance with the 2-midnight benchmark under 42 .3(e).     The reason for inpatient status is Acute Psychosis.    Bakari Miller M.D.,Ph.D.

## 2021-06-29 NOTE — PLAN OF CARE
BEHAVIORAL TEAM DISCUSSION    Participants:   Dr. Bakari Miller, Attending Psychiatrist  Jono Frank, PGY1  Keiko Robles, LEWIS Gonzales, LPCC, LADC, CTC  Progress: Initial  Anticipated length of stay: Unknown, pending stabilization and appropriate disposition plan.   Continued Stay Criteria/Rationale: AH  Medical/Physical: No acute issues - was cleared by ED for psychiatric admission  Precautions:   Behavioral Orders   Procedures     Code 1 - Restrict to Unit     Routine Programming     As clinically indicated     Status 15     Every 15 minutes.     Plan: The plan is to assess and patient for mental health and medication needs.  The patient will be prescribed medications to treat the identified symptoms.  Upon discharge the patient will be referred to services as appropriate based on the assessment.   Rationale for change in precautions or plan: No change at present-will continue to monitor and adjust as needed.

## 2021-06-29 NOTE — PLAN OF CARE
Assessment/Intervention/Current Symtoms and Care Coordination  -Refer to psychosocial completed on 6/29/2021 by DELANEY Mccormick, ZABRINA for assessment/social functioning  -Chart review  -Pre round meeting with team  -Rounded with team, addressed patient needs/concerns  -Post round meeting with team  -Initial psychosocial  -Team note  -Personal plan of care  -Started AVS    Current Symptoms include the following: AH, delayed responses    Discharge Plan or Goal  Pending stabilization & development of a safe discharge plan.  Considerations include: Return home with outpatient providers    Barriers to Discharge  Patient requires further psychiatric stabilization due to current symptomology    Referral Status  None today    Legal Status  Patient is voluntary

## 2021-06-29 NOTE — PLAN OF CARE
Pt requested PRN for sleep disturbance, PRN trazodone given @2359 with little effect. Pt sitting up in bed and was encouraged to lay down to promote rest. Pt endorsed anxiety and was given PRN hydroxyzine @0250 and fell asleep on and off. Denies pain. Pt polite, with blunted affect. Pt appeared to sleep 2 hours.     Problem: Sleep Disturbance  Goal: Adequate Sleep/Rest  Outcome: Declining

## 2021-06-29 NOTE — PLAN OF CARE
Problem: Behavioral Health Plan of Care  Goal: Patient-Specific Goal (Individualization)  Flowsheets (Taken 6/29/2021 1236)  Anxieties, Fears or Concerns: AH, Medication compliance issues  Patient Vulnerabilities: other (see comments)  Patient Personal Strengths:   community support   expressive of emotions   expressive of needs   family/social support   insight into illness/situation   interests/hobbies   socioeconomic stability   stable living environment  Note:     The patient specific goals include:   Patient will participate in unit programming  Patient will identify triggers and positive coping skills  Patient will take medications as prescribed by physician both for mental health and medical  Patient coached to work on coping packet    The patient identified the following reasons for hospitalization:    -AH        The patient identified the following goals for discharge:      -Medication management

## 2021-06-30 PROCEDURE — 124N000002 HC R&B MH UMMC

## 2021-06-30 PROCEDURE — 99232 SBSQ HOSP IP/OBS MODERATE 35: CPT | Mod: GC | Performed by: PSYCHIATRY & NEUROLOGY

## 2021-06-30 PROCEDURE — 250N000013 HC RX MED GY IP 250 OP 250 PS 637

## 2021-06-30 RX ORDER — LURASIDONE HYDROCHLORIDE 20 MG/1
40 TABLET, FILM COATED ORAL DAILY
Status: DISCONTINUED | OUTPATIENT
Start: 2021-06-30 | End: 2021-07-06

## 2021-06-30 RX ADMIN — LURASIDONE HYDROCHLORIDE 40 MG: 20 TABLET, FILM COATED ORAL at 18:40

## 2021-06-30 ASSESSMENT — ACTIVITIES OF DAILY LIVING (ADL)
DRESS: INDEPENDENT
HYGIENE/GROOMING: INDEPENDENT
LAUNDRY: WITH SUPERVISION
ORAL_HYGIENE: INDEPENDENT

## 2021-06-30 NOTE — PROGRESS NOTES
.    ----------------------------------------------------------------------------------------------------------  [facility], [place]   Psychiatric Progress Note  Hospital Day #2     Interim History:   The patient's care was discussed with the treatment team and chart notes were reviewed.    Sleep 7 hours (06/30/21 0600)  Scheduled Medications: took all scheduled medications as prescribed   PRN medications: he required PRN Olanzapine 10mg po last night, Trazodone 100mg      Staff Report:   Judah spent most of day standing in diaz or lounge watching television or activities of others-delayed responses in interactions-appears anxious and preoccupied-Received call from mom stating Judah tapered off Abilify because he was sx free for long period of time and doing very well, but had gained weight and felt tired-states he initial decreased from 5 mg to 2 mg, but then completely stopped taking in Dec 2020-states on Wed Judah asked her if she saved any of med because he had been hearing voices post two weeks-states Judah started 2 mg dose, but continued to experience hallucinations and on Sunday went for hair cut at 1500 and never returned home-family reported to police as missing person       Pt has not attended OT since admit.  Will continue to encourage participation and completion of  self-assessment as able. OT staff will explain the purpose of being involved with treatment plan and provide options to meet current needs and goals.      Pt very quiet , isolative , sits in lounge watching tv . He is med compliant , denies si . He did take some phone calls , seemed upset after one phone call . Pt standing near food in dining room , just standing looking at the food staring but refused to eat because he states he cant afford it . Explained that he does not pay for it and tray was brought to his room and he ate it.        Patient Interview:   Judah Candelaria reports that he's feeling  "\"good\" and that he slept ok. He says that his appetite is \"not doing great\" because he is \"not hungry\". Spoke to his mother yesterday and that their conversation was \"not great\". Patient agreed to the team speaking with his sister. Patient is not very conversational, has delayed speech responses to questions from the team, and does not maintain eye contact. Patient also has a flat affect. He says that his mood has been \"not great\". Patient was initially not comfortable with increasing dosage of lurasidone, but agreed when asked a second time. Patient says that he doesn't know if his condition will improve in the future. Patient says that the voices haven't changed in intensity and that he isn't hearing them during the interview.    -------------  The risks, benefits, alternatives and side effects of any medication changes have been discussed and are understood by the patient and other caregivers.        Review of systems:     ROS was negative unless noted above.          Allergies:     Allergies   Allergen Reactions     Augmentin Nausea     Clindamycin Nausea and Vomiting            Psychiatric Examination:   BP (!) 148/102 (BP Location: Left arm)   Pulse 114   Temp 98.4  F (36.9  C) (Oral)   Resp 12   Ht 1.676 m (5' 6\")   Wt 102 kg (224 lb 12.8 oz)   SpO2 98%   BMI 36.28 kg/m    Weight is 224 lbs 12.8 oz  Body mass index is 36.28 kg/m .    MENTAL STATUS EXAM    Appearance:  Awake, dressed in hospital scrubs, has long hair, well healed scar on right side of forehead   Muscle Strength and Tone: not assessed   Gait and Station: Normal   Behavior (Psychomotor):  No tremor seen, tapping his right foot constantly   Eye Contact:  Poor eye contact, looking down at the table   Speech:  Speech delay, low rate, low tone  Mood:  patient states that mood is \"good\", however later in interview says he is not doing well  Affect:  Blunted affect, congruent, non reactive, more apithetic than yesterday  Attitude: guarded, " withdrwan  Thought Process:  Impaired concentration, asked to repeat questions, goal-oriented, linear    Thought Content:  No evidence of suicidal or homicidal ideation. +AH, denies VH   Associations:  Limited   Insight:  Poor   Judgment:  Fair    Oriented to: person, place, time  Attention Span and Concentration:  Shortened attention span and poor concentration   Recent and Remote Memory:  Memory intact, could remember where he worked, lived and recent history  Language: Fluent in English with appropriate syntax          Labs:   No results found for this or any previous visit (from the past 24 hour(s)).     Assessment    Principal Diagnosis:   #schizophrenia     Diagnostic Impression:   Judah Candelaria is a 36 year old male previously diagnosed with major depressive disorder with psychosis who presented to [facility] psychiatry on  06/29/2021  with auditory hallucinations and paranoid delusion in the context of 2 months of medication non-adherence and social withdrawal during this time. He presented to the ED on 6/27/2021 stating that he has been hearing whispering voices for the last two weeks along with feeling paranoid about being stalked and was transferred to [facility]. He had a similar episode a 2 years ago where he was hospitalized for 10 days, stabilized on aripiprazole and given a diagnosis of major depressive disorder with psychosis. He is currently prescribed Aripiprazole for psychosis and trazodone for poor sleep, however he discontinued both medications 3 months ago in accordance with recommendations from his medication . Given these findings along with the recent auditory hallucinations and paranoid delusions, the patient's presentation is most consistent with schizophrenia. Differential includes previously diagnosed MDD with psychotic features (although does not meet criteria for MDD currently), schizoaffective disorder, substance induced psychosis.                 Psychiatric [facility]  course:   Judah Candelaria was admitted to [facility] as a voluntary patient. Upon admission to the ED, patient was given 5 mg of Olanzapine and on 6/28 was given hydroxyzine for anxiety and Trazodone for poor sleep. Patient appeared to sleep 7 hours last night. On 6/29, he was placed on Lurasidone  20 mg/day for psychosis due to unwanted weight gain from previously prescribed Aripiprazole. He required he required PRN Olanzapine 10mg po last night, Trazodone 100mg.  On 6/30, the patients mood was more depressed and he was more guarded. Given this, the plan is to increase Lurasidone to 40 mg/day.     Discontinued Medications (& Rationale):  None    Medical course   Patient was medically cleared prior to admission.    Data:   Negative Urinary tox 6/28    Consults:   none    Plan     Today's Changes:  - Increase Lurasidone to 40 mg at dinner tonight given the patients declining mood and increased apathy and guardedness.     Psychotropic Medications:  Scheduled Psych Medications:  - Lurasidone     PRN Psych Medications  - Hydroxyzine 25 mg PRN Q4H  - Olanzapine 10 mg PO/IM prn Q2H severe agitation/psychosis  - Trazodone 50 mg PRN QHS    Medical diagnoses to be addressed this admission:    #: schizophrenia     Patient will be treated in therapeutic milieu with appropriate individual and group therapies as described.      Legal Status:   Orders Placed This Encounter      Voluntary      Safety Assessment:   Behavioral Orders   Procedures     Code 1 - Restrict to Unit     Routine Programming     As clinically indicated     Status 15     Every 15 minutes.       Disposition: Pending stabilization & development of a safe discharge plan.     The patient was seen and the plan was discussed with the attending physician.     This patient was seen and discussed with my attending physician.  Quintin Rojas MD  Psychiatry PGY1    Vincenzo Ness  MS3    Resident Attestation:  I was present with the medical student who participated  in the service and in the documentation of the note. I have verified the history and personally performed the exam and medical decision making. I agree with the assessment and plan of care as documented in the note.    - Quintin Rojas MD PGY-1    Attestation:  This patient has been seen and evaluated by me, Bakari Miller MD.  I have discussed this patient with the house staff team including the resident and medical student and I agree with the findings and plan in this note.    I have reviewed today's vital signs, medications, labs and imaging. Bakari Miller MD , PhD.

## 2021-06-30 NOTE — PLAN OF CARE
Pt appeared to sleep 7 hours which is an improvement from last night. No PRNs given or requested. No medical or behavioral events this shift.      Problem: Sleep Disturbance  Goal: Adequate Sleep/Rest  Outcome: Improving

## 2021-06-30 NOTE — PLAN OF CARE
Problem: Behavioral Health Plan of Care  Goal: Plan of Care Review  Outcome: No Change  Flowsheets (Taken 6/30/2021 1423)  Plan of Care Reviewed With: patient  Progress: no change  Patient Agreement with Plan of Care: agrees     Problem: Sensory Perception Impairment (Psychotic Signs/Symptoms)  Goal: Decreased Sensory Symptoms (Psychotic Signs/Symptoms)  Outcome: No Change     Problem: Mood Impairment (Psychotic Signs/Symptoms)  Goal: Improved Mood Symptoms (Psychotic Signs/Symptoms)  Outcome: No Change     Judah continues to freq  lounge or diaz watching activities of others or looking out the window-appears preoccupied-declined bkft-initially declined lunch, but post encouragement ate entire lunch-Judah denies any noted side effects from Latuda-sxs continue unchanged-when asked if he was experiencing depressed mood, stated he hadn't been, but post return of auditory hallucinations began to feel depressed-Judah was not observed initiating interactions with others-signed vol consent for tx- 1500 Mom phoned, states Judah on phone today denied sxs today and told mom he's ready for discharge-when mom questioned this possibility Judah abruptly hung up phone on her-mom attempted to call again and Judah repeated behavior-Mom thinks this is related to [place] which [place] scheduled for Saturday

## 2021-06-30 NOTE — PLAN OF CARE
Problem: Sensory Perception Impairment (Psychotic Signs/Symptoms)  Goal: Decreased Sensory Symptoms (Psychotic Signs/Symptoms)  Outcome: No Change   Pt very quiet , isolative , sits in lounge watching tv . He is med compliant , denies si . He did take some phone calls , seemed upset after one phone call . Pt standing near food in dining room , just standing looking at the food staring but refused to eat because he states he cant afford it . Explained that he does not pay for it and tray was brought to his room and he ate it

## 2021-07-01 PROCEDURE — 99232 SBSQ HOSP IP/OBS MODERATE 35: CPT | Mod: GC | Performed by: PSYCHIATRY & NEUROLOGY

## 2021-07-01 PROCEDURE — 250N000013 HC RX MED GY IP 250 OP 250 PS 637

## 2021-07-01 PROCEDURE — 124N000002 HC R&B MH UMMC

## 2021-07-01 RX ADMIN — LURASIDONE HYDROCHLORIDE 40 MG: 20 TABLET, FILM COATED ORAL at 18:30

## 2021-07-01 ASSESSMENT — ACTIVITIES OF DAILY LIVING (ADL)
LAUNDRY: WITH SUPERVISION
DRESS: INDEPENDENT
ORAL_HYGIENE: INDEPENDENT
HYGIENE/GROOMING: INDEPENDENT

## 2021-07-01 ASSESSMENT — MIFFLIN-ST. JEOR: SCORE: 1897.9

## 2021-07-01 NOTE — PLAN OF CARE
Nursing Plan of care   Problem: Sensory Perception Impairment (Psychotic Signs/Symptoms)  Goal: Decreased Sensory Symptoms (Psychotic Signs/Symptoms)  Outcome: No Change  Flowsheets (Taken 6/30/2021 2216)  Mutually Determined Action Steps (Decreased Sensory Symptoms): shares insight re: need for meds   Pt spent the entire shift out in the milieu; was noted standing for extended time in the lounge area and starring at the TV and space; endorsed depression and rated 5/10, on scale of 10 being high; initially declined to take his 1700 scheduled Latuda, but later received with reproach with other nurse; endorsed AH but denied SI/SIB and anxiety; denied medication side effect; reported improvement since admission; will continue to monitor and assess.

## 2021-07-01 NOTE — PLAN OF CARE
Problem: Behavioral Health Plan of Care  Goal: Plan of Care Review  Outcome: No Change     Problem: Sensory Perception Impairment (Psychotic Signs/Symptoms)  Goal: Decreased Sensory Symptoms (Psychotic Signs/Symptoms)  Outcome: No Change  Flowsheets (Taken 7/1/2021 1404)  Mutually Determined Action Steps (Decreased Sensory Symptoms): adheres to medication regimen     Problem: Mood Impairment (Psychotic Signs/Symptoms)  Goal: Improved Mood Symptoms (Psychotic Signs/Symptoms)  Outcome: No Change     Judah continues to  lounge for long periods of time watching activities or looking out window-appears preoccupied, tense and guarded-did eat bkft and lunch today-

## 2021-07-01 NOTE — PLAN OF CARE
Problem: Sleep Disturbance  Goal: Adequate Sleep/Rest  Outcome: No Change   Observed to have slept well for approx 7 hrs overnight w/ regular non-labored respirations and no reported or observed distress.  Safe, therapeutic environment maintained

## 2021-07-01 NOTE — PLAN OF CARE
Assessment/Intervention/Current Symptoms and Care Coordination  - chart review  - team meeting     - post team rounds team meeting / discussion       Discharge Plan or Goal  Pending stabilization & development of a safe discharge plan.  Considerations include: Return home with outpatient providers - possible referral to new medication provider. Currently medications are managed by a [facility]        Barriers to Discharge   Patient requires further psychiatric stabilization due to current symptomology      Referral Status  no referrals today      Legal Status  Patient is voluntary

## 2021-07-01 NOTE — PLAN OF CARE
"Nursing plan of care   Problem: Mood Impairment (Psychotic Signs/Symptoms)  Goal: Improved Mood Symptoms (Psychotic Signs/Symptoms)  Outcome: No Change   Pt was visible in the milieu; spent most of the shift in the lounge watching TV; appeared internally preoccupied and withdrawn; garded and tense on approach; answered  assessment questions with \"No\"; Pt Mother, Jossy Champion called the unit around 1612 and verbalized her concern of pt behavior during phone conversation with him. Pt mother reported that pt requested to picked up and come home, but she thought the pt is not ready and shown no Improvement. The mother also raised a concern of him discharged home without family knowledge while they are out of the city for the weekend; was requested to have a call if possible from a Dr and attending resident was notified. Ate 100% of his dinner and appetite is good; received scheduled Latuda with dinner; denied medication side effect; will continue to monitor and assess.        "

## 2021-07-01 NOTE — PROGRESS NOTES
.    ----------------------------------------------------------------------------------------------------------  [facility], [place]   Psychiatric Progress Note  Hospital Day #3     Interim History:   The patient's care was discussed with the treatment team and chart notes were reviewed.    Sleep 7 hours (07/01/21 0600)  Scheduled Medications: took all scheduled medications as prescribed   PRN medications: He din not require PRN medications      Staff Report:   Judah continues to frequently  lounge area or hallway watching activities of others or looking out the window-appears preoccupied-declined breakfast -initially declined lunch, but post encouragement ate entire lunch-Judah denies any noted side effects from Latuda-sxs continue unchanged-when asked if he was experiencing depressed mood, stated he hadn't been, but post return of auditory hallucinations began to feel depressed-Judah was not observed initiating interactions with others-signed vol consent for tx- 1500 Mom phoned, states Judah on phone today denied sxs today and told mom he's ready for discharge-when mom questioned this possibility Judah abruptly hung up phone on her-mom attempted to call again and Judah repeated behavior-Mom thinks this is related to [place] which is scheduled for Saturday.    The overnight staff reported Pt spent the entire shift out in the milieu; was noted standing for extended time in the lounge area and starring at the TV and space; endorsed depression and rated 5/10, on scale of 10 being high; initially declined to take his 1700 scheduled Latuda, but later received with reproach with other nurse; endorsed AH but denied SI/SIB and anxiety; denied medication side effect; reported improvement since admission; will continue to monitor and assess    Patient Interview:   Judah Candelaria refused to be interviewed in the conference room because he  "doesn't feel safe. Upon approach in the recreational room, he stated that he did not want to talk and was visually upset and anxious. Says that his mood is \"fine\" and does not want to talk about AH. Says \"yes\" when asked if depression is better than yesterday. Says he does not want to increase dose of Latuda and that he \"doesn't need any meds.\" Patient decline answering further questions.    -------------  The risks, benefits, alternatives and side effects of any medication changes have been discussed and are understood by the patient and other caregivers.        Review of systems:     ROS was negative unless noted above.          Allergies:     Allergies   Allergen Reactions     Augmentin Nausea     Clindamycin Nausea and Vomiting            Psychiatric Examination:   BP (!) 146/91 (BP Location: Left arm)   Pulse 121   Temp 98.1  F (36.7  C) (Tympanic)   Resp 12   Ht 1.676 m (5' 6\")   Wt 102 kg (224 lb 12.8 oz)   SpO2 98%   BMI 36.28 kg/m    Weight is 224 lbs 12.8 oz  Body mass index is 36.28 kg/m .    MENTAL STATUS EXAM    Appearance:  Awake, dressed in hospital scrubs, has long hair, well healed scar on right side of forehead   Muscle Strength and Tone: not assessed   Gait and Station: not assessed   Behavior (Psychomotor):  No tremor seen, tapping his right foot constantly   Eye Contact:  Poor eye contact, looking down    Speech:  less Speech delay than yesterday, low rate, low tone, reticent  Mood:  patient states that mood is \"fine\"  Affect:  Blunted affect, congruent, non reactive, apathetic  Attitude: guarded, withdrwan  Thought Process:  Impaired concentration, withdrawn  Thought Content:  No evidence of suicidal or homicidal ideation. +AH, denies VH   Associations:  Limited   Insight:  Poor   Judgment:  poor    Oriented to: person, place, time  Attention Span and Concentration:  Shortened attention span and poor concentration   Recent and Remote Memory:  Memory intact, could remember where he " worked, lived and recent history  Language: Fluent in English with appropriate syntax          Labs:   No results found for this or any previous visit (from the past 24 hour(s)).     Assessment    Principal Diagnosis:   #schizophrenia     Diagnostic Impression:   Judah Candelaria is a 36 year old male previously diagnosed with major depressive disorder with psychosis who presented to [facility] psychiatry on  06/29/2021  with auditory hallucinations and paranoid delusion in the context of 2 months of medication non-adherence and social withdrawal during this time. He presented to the ED on 6/27/2021 stating that he has been hearing whispering voices for the last two weeks along with feeling paranoid about being stalked and was transferred to [facility]. He had a similar episode a 2 years ago where he was hospitalized for 10 days, stabilized on aripiprazole and given a diagnosis of major depressive disorder with psychosis. He is currently prescribed Aripiprazole for psychosis and trazodone for poor sleep, however he discontinued both medications 3 months ago in accordance with recommendations from his medication . Given these findings along with the recent auditory hallucinations and paranoid delusions, the patient's presentation is most consistent with schizophrenia. Differential includes previously diagnosed MDD with psychotic features (although does not meet criteria for MDD currently), schizoaffective disorder, substance induced psychosis.                 Psychiatric Hospital course:   Judah Candelaria was admitted to [facility] as a voluntary patient. Upon admission to the ED, patient was given 5 mg of Olanzapine and on 6/28 was given hydroxyzine for anxiety and Trazodone for poor sleep. Patient appeared to sleep 7 hours last night. On 6/29, he was placed on Lurasidone  20 mg/day for psychosis due to unwanted weight gain from previously prescribed Aripiprazole. He required he required PRN  Olanzapine 10mg po on 06/28t, Trazodone 100mg.  On 6/30, the patients mood was more depressed and he was more guarded. Given this, Lurasidone was increased to 40 mg/day. Patient disagreed with plan of increasing Lurasidone to 60mg PO on 7/01.    Discontinued Medications (& Rationale):  None    Medical course   Patient was medically cleared prior to admission.    Data:   Negative Urinary tox 6/28    Consults:   none    Plan     Today's Changes:  - Continued to offer increased Lurasidone   - Continue offering treatment options.    Psychotropic Medications:  Scheduled Psych Medications:  - Lurasidone 40 mg    PRN Psych Medications  - Hydroxyzine 25 mg PRN Q4H  - Olanzapine 10 mg PO/IM prn Q2H severe agitation/psychosis  - Trazodone 50 mg PRN QHS    Medical diagnoses to be addressed this admission:    #: schizophrenia     Patient will be treated in therapeutic milieu with appropriate individual and group therapies as described.      Legal Status:   Orders Placed This Encounter      Voluntary      Safety Assessment:   Behavioral Orders   Procedures     Code 1 - Restrict to Unit     Routine Programming     As clinically indicated     Status 15     Every 15 minutes.       Disposition: Pending stabilization & development of a safe discharge plan.     The patient was seen and the plan was discussed with the attending physician.     This patient was seen and discussed with my attending physician.    Vincenzo Ness  MS3    Quintin Rojas MD  Psychiatry PGY-1   Resident Attestation:   I was present with the medical student who participated in the service and in the documentation of the note. I have verified the history and personally performed the exam and medical decision making. I agree with the assessment and plan of care as documented in the note.    Attestation:  This patient has been seen and evaluated by me, Bakari Miller MD.  I have discussed this patient with the house staff team including the resident and medical  student and I agree with the findings and plan in this note.    I have reviewed today's vital signs, medications, labs and imaging. Bakari Miller MD , PhD.

## 2021-07-02 PROCEDURE — 250N000013 HC RX MED GY IP 250 OP 250 PS 637: Performed by: STUDENT IN AN ORGANIZED HEALTH CARE EDUCATION/TRAINING PROGRAM

## 2021-07-02 PROCEDURE — 250N000013 HC RX MED GY IP 250 OP 250 PS 637

## 2021-07-02 PROCEDURE — 90853 GROUP PSYCHOTHERAPY: CPT

## 2021-07-02 PROCEDURE — 124N000002 HC R&B MH UMMC

## 2021-07-02 RX ADMIN — TRAZODONE HYDROCHLORIDE 100 MG: 100 TABLET ORAL at 20:40

## 2021-07-02 RX ADMIN — LURASIDONE HYDROCHLORIDE 40 MG: 20 TABLET, FILM COATED ORAL at 17:55

## 2021-07-02 ASSESSMENT — ACTIVITIES OF DAILY LIVING (ADL)
HYGIENE/GROOMING: SHOWER;INDEPENDENT
ORAL_HYGIENE: INDEPENDENT
DRESS: SCRUBS (BEHAVIORAL HEALTH);INDEPENDENT

## 2021-07-02 NOTE — PROGRESS NOTES
".    ----------------------------------------------------------------------------------------------------------  [facility], [place]   Psychiatric Progress Note  Hospital Day #4     Interim History:   The patient's care was discussed with the treatment team and chart notes were reviewed.    Sleep 6.75 hours (07/02/21 0600)  Scheduled Medications: took all scheduled medications as prescribed   PRN medications: He din not require PRN medications      Staff Report:     Judah continues to  lounge for long periods of time watching activities or looking out window-appears preoccupied, tense and guarded-did eat breakfast and lunch today.     Pt was visible in the milieu; spent most of the shift in the lounge watching TV; appeared internally preoccupied and withdrawn; garded and tense on approach; answered MH assessment questions with \"No\"; Pt Mother, Jossy Champion called the unit around 1612 and verbalized her concern of pt behavior during phone conversation with him. Pt mother reported that pt requested to picked up and come home, but she thought the pt is not ready and shown no Improvement. The mother also raised a concern of him discharged home without family knowledge while they are out of the city for the weekend; was requested to have a call if possible from a Dr and attending resident was notified. Ate 100% of his dinner and appetite is good; received scheduled Latuda with dinner; denied medication side effect; will continue to monitor and assess.                  Patient Interview:   Judah Candelaria says that he is \"fine\" and that the voices are \"fine\". Patient stated that he \"didn't feel like talking after all\" and left.    Spoke with mother this morning, 7/2/21: On 6/30, she talked to him and he stated that he is feeling better, not hearing voices and wanted her to come pick him up. When she expressed reservation he got angry and hung up.  In her last conversation with " "Judah on 7/1, he told her that he again felt better and didn't need his medications. He angrily asked her to \"come get me now\" and again hung up when she denied. She stated that the Saturday before his admission, a guest at their home found him late at night standing in the dark in the kitchen. She is concerned that he will be released before he's improved and has noticed he has not been himself for the last month. Furthermore, she stated that the patients friend told his mom that Judah recently \"went to the train tracks to sit and wait on a train to hit him.\" She reports that he has been off Abilify since December 2020, however he tried 2 mg last Thursday and Friday since he was hearing voices again. She was informed about the process of requesting commitment for Judah and says she is interested, importantly because they are very concern about his safety due to SI if discharged.   -------------  The risks, benefits, alternatives and side effects of any medication changes have been discussed and are understood by the patient and other caregivers.        Review of systems:     ROS was negative unless noted above.          Allergies:     Allergies   Allergen Reactions     Augmentin Nausea     Clindamycin Nausea and Vomiting            Psychiatric Examination:   /84   Pulse 95   Temp 97.7  F (36.5  C) (Tympanic)   Resp 16   Ht 1.676 m (5' 6\")   Wt 101.5 kg (223 lb 12.8 oz)   SpO2 99%   BMI 36.12 kg/m    Weight is 223 lbs 12.8 oz  Body mass index is 36.12 kg/m .    MENTAL STATUS EXAM    Appearance:  Awake, dressed in hospital scrubs, has long hair, well healed scar on right side of forehead   Muscle Strength and Tone: not assessed   Gait and Station: not assessed   Behavior (Psychomotor):  No tremor seen, tapping his right foot constantly   Eye Contact:  Poor eye contact, looking down    Speech:  Speech delay improving but still some delay, low rate, low tone, reticent  Mood:  patient states that mood " "is \"fine\"  Affect:  Blunted affect, non-congruent with mood, non reactive, apathetic  Attitude: guarded, withdrawn  Thought Process:  Impaired concentration, withdrawn  Thought Content:  No evidence of suicidal or homicidal ideation. +AH, denies VH   Associations:  Limited   Insight:  Poor   Judgment:  poor    Oriented to: person, place, time  Attention Span and Concentration:  Shortened attention span and poor concentration   Recent and Remote Memory:  Memory intact, could remember where he worked, lived and recent history  Language: Fluent in English with appropriate syntax          Labs:   No results found for this or any previous visit (from the past 24 hour(s)).     Assessment    Principal Diagnosis:   #schizophrenia     Diagnostic Impression:   Judah Candelaria is a 36 year old male previously diagnosed with major depressive disorder with psychosis who presented to [facility] psychiatry on  06/29/2021  with auditory hallucinations and paranoid delusion in the context of 2 months of medication non-adherence and social withdrawal during this time. He presented to the ED on 6/27/2021 stating that he has been hearing whispering voices for the last two weeks along with feeling paranoid about being stalked and was transferred to [facility]. He had a similar episode a 2 years ago where he was hospitalized for 10 days, stabilized on aripiprazole and given a diagnosis of major depressive disorder with psychosis. He is currently prescribed Aripiprazole for psychosis and trazodone for poor sleep, however he discontinued both medications 3 months ago in accordance with recommendations from his medication . Given these findings along with the recent auditory hallucinations and paranoid delusions, the patient's presentation is most consistent with schizophrenia. Differential includes previously diagnosed MDD with psychotic features (although does not meet criteria for MDD currently), schizoaffective disorder, " substance induced psychosis.                 Psychiatric Hospital course:   Judah Candelaria was admitted to [facility] as a voluntary patient. Upon admission to the ED, patient was given 5 mg of Olanzapine and on 6/28 was given hydroxyzine for anxiety and Trazodone for poor sleep. Patient appeared to sleep 7 hours last night. On 6/29, he was placed on Lurasidone  20 mg/day for psychosis due to unwanted weight gain from previously prescribed Aripiprazole. He required he required PRN Olanzapine 10mg po on 06/28t, Trazodone 100mg.  On 6/30, the patients mood was more depressed and he was more guarded. Given this, Lurasidone was increased to 40 mg/day. Patient disagreed with plan of increasing Lurasidone to 60mg PO on 7/01. Patient declined to be interviewed on 7/2.    Discontinued Medications (& Rationale):  None    Medical course   Patient was medically cleared prior to admission.    Data:   Negative Urinary tox 6/28    Consults:   none    Plan     Today's Changes:  - Continue to offer increase Lurasidone dose  - Continue offering treatment options.  -Place 72hr hold if pt request to leave over the weekend.  - Consider Potential commitment if placed on 72 hr hold    Psychotropic Medications:  Scheduled Psych Medications:  - Lurasidone 40 mg    PRN Psych Medications  - Hydroxyzine 25 mg PRN Q4H  - Olanzapine 10 mg PO/IM prn Q2H severe agitation/psychosis  - Trazodone 50 mg PRN QHS    Medical diagnoses to be addressed this admission:    #: schizophrenia     Patient will be treated in therapeutic milieu with appropriate individual and group therapies as described.      Legal Status:   Orders Placed This Encounter      Voluntary      Safety Assessment:   Behavioral Orders   Procedures     Code 1 - Restrict to Unit     Routine Programming     As clinically indicated     Status 15     Every 15 minutes.       Disposition:   -Place 72hr hold if pt request to leave over the weekend.  - Consider Potential commitment if  placed on 72 hr hold  Pending stabilization & development of a safe discharge plan.     The patient was seen and the plan was discussed with the attending physician.     This patient was seen and discussed with my attending physician.    Vincenzo Ness  MS3    Quintin Rojas MD  Psychiatry PGY-1   Resident Attestation:   I was present with the medical student who participated in the service and in the documentation of the note. I have verified the history and personally performed the exam and medical decision making. I agree with the assessment and plan of care as documented in the note.

## 2021-07-02 NOTE — PLAN OF CARE
Assessment/Intervention/Current Symtoms and Care Coordination      Attended team meeting and reviewed chart notes.    Pt did not request to discharge to be with his family over the holiday weekend.        Discharge Plan or Goal  Return to home      Barriers to Discharge   Pt needs stabilization      Referral Status  None made      Legal Status;  voluntary

## 2021-07-02 NOTE — PLAN OF CARE
"  Problem: Behavioral Health Plan of Care  Goal: Plan of Care Review  Recent Flowsheet Documentation  Taken 7/2/2021 1000 by Alanna Carcamo RN  Plan of Care Reviewed With: patient  Patient Agreement with Plan of Care: agrees   \"I'm a little better.\" Some responses are delayed. States has a little depression and anxiety. Went to two groups today.   "

## 2021-07-02 NOTE — PLAN OF CARE
Problem: Sleep Disturbance  Goal: Adequate Sleep/Rest  Outcome: Improving   Observed to have slept well for approx 6.75 hrs overnight and no reported or observed distress.  Safe, therapeutic environment maintained.

## 2021-07-03 PROCEDURE — 124N000002 HC R&B MH UMMC

## 2021-07-03 PROCEDURE — 250N000013 HC RX MED GY IP 250 OP 250 PS 637: Performed by: STUDENT IN AN ORGANIZED HEALTH CARE EDUCATION/TRAINING PROGRAM

## 2021-07-03 PROCEDURE — 250N000013 HC RX MED GY IP 250 OP 250 PS 637

## 2021-07-03 RX ADMIN — ACETAMINOPHEN 650 MG: 325 TABLET, FILM COATED ORAL at 06:05

## 2021-07-03 RX ADMIN — LURASIDONE HYDROCHLORIDE 40 MG: 20 TABLET, FILM COATED ORAL at 17:53

## 2021-07-03 RX ADMIN — TRAZODONE HYDROCHLORIDE 100 MG: 100 TABLET ORAL at 20:18

## 2021-07-03 ASSESSMENT — ACTIVITIES OF DAILY LIVING (ADL)
DRESS: INDEPENDENT
ORAL_HYGIENE: INDEPENDENT
HYGIENE/GROOMING: INDEPENDENT
LAUNDRY: WITH SUPERVISION

## 2021-07-03 NOTE — PLAN OF CARE
Pt appeared to sleep 6.25 hours. Pt awake @0130 for water. Pt complained of headache, PRN tylenol was given @0605. No medical or behavioral events this shift.      Problem: Sleep Disturbance  Goal: Adequate Sleep/Rest  Outcome: No Change

## 2021-07-03 NOTE — PLAN OF CARE
Patient sitting on periphery of lounge majority of shift with minimal interaction with staff and peers.  Pt teary-eyed at times and not making eye contact.  Pt denied any thoughts of self harm; and also denies any hallucinations.  Pt appears depressed.

## 2021-07-03 NOTE — PLAN OF CARE
Problem: Behavioral Health Plan of Care  Goal: Plan of Care Review  Outcome: Improving  Flowsheets (Taken 7/3/2021 1043)  Plan of Care Reviewed With: patient  Patient Agreement with Plan of Care: agrees     Problem: Sensory Perception Impairment (Psychotic Signs/Symptoms)  Goal: Decreased Sensory Symptoms (Psychotic Signs/Symptoms)  Outcome: Improving     Problem: Mood Impairment (Psychotic Signs/Symptoms)  Goal: Improved Mood Symptoms (Psychotic Signs/Symptoms)  Outcome: Improving  Flowsheets (Taken 7/3/2021 1044)  Mutually Determined Action Steps (Improved Mood Symptoms): acknowledges progress     Judah appeared more relaxed this AM-watching movies in lounge, sitting in chair-denied hallucinations, side effects from medications-responses less delayed-declined phone call from mom-per mom, Judah hung up the phone on best friend, Maya, couple days ago when she tried to talk with him about sxs of illness and tx-mom states this is very out of character for Judah-this afternoon Judah lying in bed awake-appears preoccupied-denies any concerns

## 2021-07-03 NOTE — PROGRESS NOTES
" 07/02/21 2200   Groups   Details   (Psychotherapy)   Number of patients attending the group:  4  Group Length:  1 Hours    Group Therapy Type: Psychotherapy    Summary of Group / Topics Discussed:      The  Psychotherapy group goal is to promote insight to positive choice and change. Group processing is within a supportive and safe environment. Patients will process emotions using verbal group and expressive psychotherapy interventions including visual art/writing interventions.    Group interventions support patients by: creative self expression and self efficacy/empowerment    Modalities to reach these goals include: positive/solution focused psychology  and Expressive Arts Therapies    Subjective -patient report of mood today- \" alright\"    Objective/ Intervention- Goal of group and Therapeutic modality utilized- Affirmation and empowerment messages    Group Response- engaged    Patient Response- pt reported the statement he tells himself during difficult times is  \" calm down.\" He mad a nice piece of art with the graphic large text \" calm down with a sun. He didn't share much verbally but seemed to brighten with the self expression.    Garry Sewell, GUSTAVO, ATR-BC          "

## 2021-07-04 PROCEDURE — 250N000013 HC RX MED GY IP 250 OP 250 PS 637: Performed by: STUDENT IN AN ORGANIZED HEALTH CARE EDUCATION/TRAINING PROGRAM

## 2021-07-04 PROCEDURE — 250N000013 HC RX MED GY IP 250 OP 250 PS 637

## 2021-07-04 PROCEDURE — 124N000002 HC R&B MH UMMC

## 2021-07-04 RX ADMIN — TRAZODONE HYDROCHLORIDE 100 MG: 100 TABLET ORAL at 20:53

## 2021-07-04 RX ADMIN — LURASIDONE HYDROCHLORIDE 40 MG: 20 TABLET, FILM COATED ORAL at 18:04

## 2021-07-04 ASSESSMENT — MIFFLIN-ST. JEOR: SCORE: 1895.18

## 2021-07-04 NOTE — PLAN OF CARE
Judah this AM standing in room for long period of time staring-denies issue-more isolative today-did periodically sit in chair in lounge watching television, but also periodically standing on side of lounge staring-minimum interaction with others-per mom, Judah hoping to be discharged tomorrow as she has told him when he is [place]

## 2021-07-04 NOTE — PLAN OF CARE
Pt appeared to sleep 5.5 hours. No PRNs given or requested. Pt up @0130 for water. No medical or behavioral events this shift.      Problem: Sleep Disturbance  Goal: Adequate Sleep/Rest  Outcome: No Change

## 2021-07-04 NOTE — PLAN OF CARE
Patient out in milieu more tonight, watching TV and interacting occasionally with peers.  Still appears shut down and preoccupied, but no crying observed tonight.

## 2021-07-05 LAB
LABORATORY COMMENT REPORT: NORMAL
SARS-COV-2 RNA RESP QL NAA+PROBE: NEGATIVE
SPECIMEN SOURCE: NORMAL

## 2021-07-05 PROCEDURE — G0177 OPPS/PHP; TRAIN & EDUC SERV: HCPCS

## 2021-07-05 PROCEDURE — 124N000002 HC R&B MH UMMC

## 2021-07-05 PROCEDURE — U0003 INFECTIOUS AGENT DETECTION BY NUCLEIC ACID (DNA OR RNA); SEVERE ACUTE RESPIRATORY SYNDROME CORONAVIRUS 2 (SARS-COV-2) (CORONAVIRUS DISEASE [COVID-19]), AMPLIFIED PROBE TECHNIQUE, MAKING USE OF HIGH THROUGHPUT TECHNOLOGIES AS DESCRIBED BY CMS-2020-01-R: HCPCS | Performed by: STUDENT IN AN ORGANIZED HEALTH CARE EDUCATION/TRAINING PROGRAM

## 2021-07-05 PROCEDURE — U0005 INFEC AGEN DETEC AMPLI PROBE: HCPCS | Performed by: STUDENT IN AN ORGANIZED HEALTH CARE EDUCATION/TRAINING PROGRAM

## 2021-07-05 PROCEDURE — 250N000013 HC RX MED GY IP 250 OP 250 PS 637

## 2021-07-05 RX ADMIN — LURASIDONE HYDROCHLORIDE 40 MG: 20 TABLET, FILM COATED ORAL at 18:14

## 2021-07-05 ASSESSMENT — ACTIVITIES OF DAILY LIVING (ADL)
ORAL_HYGIENE: INDEPENDENT
LAUNDRY: WITH SUPERVISION
DRESS: INDEPENDENT
HYGIENE/GROOMING: INDEPENDENT

## 2021-07-05 NOTE — PLAN OF CARE
Assessment/Intervention/Current Symtoms and Care Coordination    No team meeting today. Reviewed chart notes.    Writer meet with pt. He had a flat, blunted affect and made no eye contact. He stated 'no' when asked if he had an issues or concerns I could help with.    Writer started AVS- needs some tweaking. Try [facility] in [place] as pt lives in [place].      Discharge Plan or Goal  To home when stable      Barriers to Discharge   Needs further stabilization      Referral Status  None made    Legal Status  voluntary

## 2021-07-05 NOTE — PLAN OF CARE
Problem: General Rehab Plan of Care  Goal: Occupational Therapy Goals  Description: The patient and/or their representative will achieve their patient-specific goals related to the plan of care.  The patient-specific goals include:    Will attend OT groups and participate actively in all OT opportunities. Will assess and set goals.    Outcome: No Change    Daily note:  Pt attended 2 of 3 OT groups today. Pt participated in OT clinic IND, where he initiated a chosen project (coloring a picture), followed through with plan, and asked for support with supplies as needed. Pt observed to take an extended period of time to complete a simple coloring task, presented with a flat affect, and kept to himself throughout. Refused to complete the OT initial assessment.

## 2021-07-05 NOTE — PLAN OF CARE
Problem: Mood Impairment (Psychotic Signs/Symptoms)  Goal: Improved Mood Symptoms (Psychotic Signs/Symptoms)  Outcome: Improving    Patient observed sitting in the lounge at start of this shift.  Behavior is calm, affect is flat/blunted. Ate dinner but keeps to self.  Took scheduled meds without issues. Patient sat in the lounge all shift watching tv, unable to check in but writer will attempt when patient is done watching a movie. No self injurious behavior observed.  Patient is med compliant.

## 2021-07-05 NOTE — PLAN OF CARE
Pt appeared to sleep 7 hours. No PRNs given or requested. No medical or behavioral events this shift.      Problem: Sleep Disturbance  Goal: Adequate Sleep/Rest  Outcome: Improving

## 2021-07-05 NOTE — PLAN OF CARE
Judah spending time [place] [facility] [place]-attended AM grps-denies side effects from Latuda-stated he did not know why he's even taking Latuda-reminded him of c/o auditory hallucinations on admission-shrugged his shoulders and stated he is not hearing voices now and doesn't think he needs the medication-when RN commented that perhaps medication has eliminated voices, Delano did not seem convinced-when asked if he prefers Latuda or Abilify stated there is no difference between them-irritable in discussion re medications-Received phone call from mom-mom states Judah has been telling her he is not taking medication-mom stated she is not sure if he really isn't or if he is saying that to her so she will think he doesn't need them if he improves-

## 2021-07-05 NOTE — PLAN OF CARE
Patient more visible this shift; albeit, still minimal interaction with staff and peers.  Pt watching movies for part of shift, sitting among peers.

## 2021-07-06 PROCEDURE — 124N000002 HC R&B MH UMMC

## 2021-07-06 PROCEDURE — 250N000013 HC RX MED GY IP 250 OP 250 PS 637

## 2021-07-06 PROCEDURE — 99232 SBSQ HOSP IP/OBS MODERATE 35: CPT | Mod: GC | Performed by: PSYCHIATRY & NEUROLOGY

## 2021-07-06 RX ORDER — LURASIDONE HYDROCHLORIDE 20 MG/1
60 TABLET, FILM COATED ORAL DAILY
Status: DISCONTINUED | OUTPATIENT
Start: 2021-07-06 | End: 2021-07-14 | Stop reason: HOSPADM

## 2021-07-06 RX ADMIN — LURASIDONE HYDROCHLORIDE 60 MG: 20 TABLET, FILM COATED ORAL at 18:07

## 2021-07-06 ASSESSMENT — ACTIVITIES OF DAILY LIVING (ADL)
DRESS: INDEPENDENT
LAUNDRY: WITH SUPERVISION
ORAL_HYGIENE: INDEPENDENT
HYGIENE/GROOMING: INDEPENDENT

## 2021-07-06 ASSESSMENT — MIFFLIN-ST. JEOR: SCORE: 1893.82

## 2021-07-06 NOTE — PLAN OF CARE
"Nursing plan of care   Problem: Sensory Perception Impairment (Psychotic Signs/Symptoms)  Goal: Decreased Sensory Symptoms (Psychotic Signs/Symptoms)  Outcome: Improving  Pt was visible in the milieu; presented with flat/blunted affect and calm behavior; appeared preoccupied with internal thoughts; withdrawn and kept to himself; speech was short and minimal; answered most questions with either \"yes\" or \"No\"; encouraged to attend group, but declined; denied having MH symptoms of SI/SIB,AVH,depression and anxiety; VS was stable except /93 and pulse is 110; denied medication side effect; medication complaint; spent the shift pacing in the hallway and watching TV in the lounge; ate dinner and snack; appetite is good; reported poor quality of sleep, but declined medication change/intervention.Received by a phone call from his Mom; pt's Mom also called the unit and expressed interest to talk to his attending Dr about pt progress and discharge planning. Will continue to monitor and assess.         "

## 2021-07-06 NOTE — PROGRESS NOTES
".    ----------------------------------------------------------------------------------------------------------  [facility], [place]   Psychiatric Progress Note  Hospital Day #8     Interim History:   The patient's care was discussed with the treatment team and chart notes were reviewed.    Sleep 6.25 hours (07/06/21 0600)  Scheduled Medications: took all scheduled medications as prescribed   PRN medications: He din not require PRN medications      Staff Report:     Judah spending time in lounge watching television-attended AM grps-denies side effects from Latuda-stated he did not know why he's even taking Latuda-reminded him of c/o auditory hallucinations on admission-shrugged his shoulders and stated he is not hearing voices now and doesn't think he needs the medication-when RN commented that perhaps medication has eliminated voices, Delano did not seem convinced-when asked if he prefers Latuda or Abilify stated there is no difference between them-irritable in discussion re medications-Received phone call from mom-mom states Judah has been telling her he is not taking medication-mom stated she is not sure if he really isn't or if he is saying that to her so she will think he doesn't need them if he improves.           \"less delayed-declined phone call from mom-per mom, Judah hung up the phone on best friend, Maya, couple days ago when she tried to talk with him about sxs of illness and tx-mom states this is very out of character for Judah-this afternoon Judah lying in bed awake-appears preoccupied-denies any concerns\"      Patient Interview:   Judah reports that he is doing \"good\" and that his energy has been \"kind of low\". He says that his energy hasn't changed since his admission. He reports that his appetite has been ok. He says that his sleep has been \"not great\" recently and partially contributes it to being in the hospital. He intermittently maintained eye contact " "and laughed at a joke. He agrees that he's noticed improvement, particularly in this thought process. He denies constipation, dry eyes and dry mouth, and restless legs. He says that he's  currently not hearing voices and can't recall when he last heard them. He reports that the voices \"changed a little\" since his admission. Concerning his medication, he states that doesn't think he needs them. He is comfortable keeping the dose of Latuda at its current level. He states that his current state is \"a little lower\" than his baseline. He is not interested in adding an additional medication for depression. He is interested in therapy for depression, but reports that it didn't help much in the past. When asked a second time, he agreed to increasing the Latuda dose to 60mg. He says that his current anxiety level is \"about the same\". He says that his perception of the hospital environment has changed but \"is hard to explain\". He is requesting a mattress topper.  -------------   The risks, benefits, alternatives and side effects of any medication changes have been discussed and are understood by the patient and other caregivers.        Review of systems:     ROS was negative unless noted above.          Allergies:     Allergies   Allergen Reactions     Augmentin Nausea     Clindamycin Nausea and Vomiting            Psychiatric Examination:   BP (!) 141/91   Pulse 97   Temp 97.7  F (36.5  C) (Tympanic)   Resp 15   Ht 1.676 m (5' 6\")   Wt 101.1 kg (222 lb 14.4 oz)   SpO2 98%   BMI 35.98 kg/m    Weight is 222 lbs 14.4 oz  Body mass index is 35.98 kg/m .    MENTAL STATUS EXAM    Appearance:  Awake, dressed in hospital scrubs, has long hair, well healed scar on right side of forehead   Muscle Strength and Tone: not assessed   Gait and Station: Normal Gait  Behavior (Psychomotor):  No tremor seen  Eye Contact:  Poor eye contact, looking down    Speech:  Speech delay improving but still some delay, low rate, low tone, more " "expressive  Mood:  patient states that mood is \"ok\"  Affect:  Blunted affect, congruent, lower reactivity but improving since admission, apathetic  Attitude: guarded  Thought Process:  withdrawn, goal-directed  Thought Content:  No evidence of suicidal or homicidal ideation. +AH, denies VH   Associations:  Limited   Insight:  Poor   Judgment:  fair    Oriented to: person, place, time  Attention Span and Concentration:  Shortened attention span  Recent and Remote Memory:  Memory intact, could remember where he worked, lived and recent history  Language: Fluent in English with appropriate syntax          Labs:     Results for orders placed or performed during the hospital encounter of 06/28/21 (from the past 24 hour(s))   Asymptomatic SARS-CoV-2 COVID-19 Virus (Coronavirus) by PCR    Specimen: Nasopharyngeal   Result Value Ref Range    SARS-CoV-2 Virus Specimen Source Nasopharyngeal     SARS-CoV-2 PCR Result NEGATIVE     SARS-CoV-2 PCR Comment       Testing was performed using the Xpert Xpress SARS-CoV-2 Assay on the Cepheid Gene-Xpert   Instrument Systems. Additional information about this Emergency Use Authorization (EUA)   assay can be found via the Lab Guide.          Assessment    Principal Diagnosis:   #schizophrenia     Diagnostic Impression:   Judah Candelaria is a 36 year old male previously diagnosed with major depressive disorder with psychosis who presented to [facility] psychiatry on  06/29/2021  with auditory hallucinations and paranoid delusion in the context of 2 months of medication non-adherence and social withdrawal during this time. He presented to the ED on 6/27/2021 stating that he has been hearing whispering voices for the last two weeks along with feeling paranoid about being stalked and was transferred to [facility]. He had a similar episode a 2 years ago where he was hospitalized for 10 days, stabilized on aripiprazole and given a diagnosis of major depressive disorder with psychosis. He is " currently prescribed Aripiprazole for psychosis and trazodone for poor sleep, however he discontinued both medications 3 months ago in accordance with recommendations from his medication . Given these findings along with the recent auditory hallucinations and paranoid delusions, the patient's presentation is most consistent with schizophrenia. Differential includes previously diagnosed MDD with psychotic features (although does not meet criteria for MDD currently), schizoaffective disorder, substance induced psychosis.                 Psychiatric Hospital course:   Judah Candelaria was admitted to [facility] as a voluntary patient. Upon admission to the ED, patient was given 5 mg of Olanzapine and on 6/28 was given hydroxyzine for anxiety and Trazodone for poor sleep. Patient appeared to sleep 7 hours last night. On 6/29, he was placed on Lurasidone  20 mg/day for psychosis due to unwanted weight gain from previously prescribed Aripiprazole. He required he required PRN Olanzapine 10mg po on 06/28t, Trazodone 100mg.  On 6/30, the patients mood was more depressed and he was more guarded. Given this, Lurasidone was increased to 40 mg/day. Patient disagreed with plan of increasing Lurasidone to 60mg PO on 7/01. Patient declined to be interviewed on 7/2. On 7/6, the patient agreed to increase the dose of Lurasidone to 60 mg PO for improvement of mood symptoms.     Discontinued Medications (& Rationale):  None    Medical course   Patient was medically cleared prior to admission.    Data:   Negative Urinary tox 6/28    Consults:   none    Plan     Today's Changes:  - Increase dose of Lurasidone to 60 mg/day PO  - [employer] Mattress order    Psychotropic Medications:  Scheduled Psych Medications:  - Lurasidone 60 mg/day PO    PRN Psych Medications  - Hydroxyzine 25 mg PRN Q4H  - Olanzapine 10 mg PO/IM prn Q2H severe agitation/psychosis  - Trazodone 50 mg PRN QHS    Medical diagnoses to be addressed this  admission:    #: schizophrenia     Patient will be treated in therapeutic milieu with appropriate individual and group therapies as described.      Legal Status:   Orders Placed This Encounter      Voluntary      Safety Assessment:   Behavioral Orders   Procedures     Code 1 - Restrict to Unit     Routine Programming     As clinically indicated     Status 15     Every 15 minutes.       Disposition:   Pending stabilization & development of a safe discharge plan.     The patient was seen and the plan was discussed with the attending physician.     This patient was seen and discussed with my attending physician.    Vincenzo Ness  MS3    Quintin Rojas MD  Psychiatry PGY-1     Resident Attestation:   I was present with the medical student who participated in the service and in the documentation of the note. I have verified the history and personally performed the exam and medical decision making. I agree with the assessment and plan of care as documented in the note.    Attestation:  This patient has been seen and evaluated by me, Bkaari Miller MD.  I have discussed this patient with the house staff team including the resident and medical student and I agree with the findings and plan in this note.    I have reviewed today's vital signs, medications, labs and imaging. Bakari Miller MD , PhD.

## 2021-07-06 NOTE — PLAN OF CARE
BEHAVIORAL TEAM DISCUSSION    Participants:   Dr. Bakari Miller, Attending Psychiatrist  Dr. Quintin Rojas, PGY1  Sulma Gonzales, LPCC, LADC, CTC  Catina Hoover, OT  Yecenia Ritter, LEWIS  Progress: Patient is denying AH, does not believe he needs medications as a result. Little insight. Irritable in discussion regarding medication per nursing report. Flag, blunted affect.  Anticipated length of stay: Unknown, pending stabilization and appropriate disposition plan.   Continued Stay Criteria/Rationale: More time is needed to target symptoms  Medical/Physical: No acute issues - was cleared by ED for psychiatric admission  Precautions:   Behavioral Orders   Procedures     Code 1 - Restrict to Unit     Routine Programming     As clinically indicated     Status 15     Every 15 minutes.     Plan: Discharge home with new psychiatric provider.  Rationale for change in precautions or plan: No change at present-will continue to monitor and adjust as needed.

## 2021-07-06 NOTE — PLAN OF CARE
Pt has been visible in the milieu this shift, pleasant and calm upon interaction. He interacts minimally with staff and peers. Pt did not attend groups. Pt is able to make needs known, requested and took a shower this morning. Currently denies mental health symptoms. Sleep, appetite, and hygiene are normal. Spoke to Judah's mom on the phone and updated her on how he is doing.

## 2021-07-06 NOTE — PLAN OF CARE
Problem: Sleep Disturbance  Goal: Adequate Sleep/Rest  Outcome: No Change  Observed to have slept well for appros 6.25 hrs overnight w. Early morning awakening and nor reported or observed distress.  Quietly pleasant upon awakening, standing in room, denying any problems /discomforts. Assured of staff availability to assist w/ any needs.  Safe, therapeutic environment maintained.

## 2021-07-06 NOTE — PLAN OF CARE
Assessment/Intervention/Current Symtoms and Care Coordination  -Refer to psychosocial completed on 6/29/2021 by DELANEY Mccormick, ZABRINA  for assessment/social functioning  -Chart review  -Pre round meeting with team  -Rounded with team, addressed patient needs/concerns  -Post round meeting with team  -Team note  -AVS completed - will need to add in discharge date. Rescheduled psychiatry appointment for later in the day so patient can fill out intake paperwork on site.    Current Symptoms include the following: AH, delayed responses    Discharge Plan or Goal  Pending stabilization & development of a safe discharge plan.  Considerations include: Return home with outpatient providers    Barriers to Discharge  Patient requires further psychiatric stabilization due to current symptomology    Referral Status  None today    Legal Status  Patient is voluntary

## 2021-07-07 PROCEDURE — 124N000002 HC R&B MH UMMC

## 2021-07-07 PROCEDURE — 250N000013 HC RX MED GY IP 250 OP 250 PS 637

## 2021-07-07 PROCEDURE — 99232 SBSQ HOSP IP/OBS MODERATE 35: CPT | Mod: GC | Performed by: PSYCHIATRY & NEUROLOGY

## 2021-07-07 ASSESSMENT — ACTIVITIES OF DAILY LIVING (ADL)
ORAL_HYGIENE: INDEPENDENT
LAUNDRY: WITH SUPERVISION
HYGIENE/GROOMING: INDEPENDENT
DRESS: INDEPENDENT

## 2021-07-07 NOTE — PLAN OF CARE
Assessment/Intervention/Current Symtoms and Care Coordination  -Refer to psychosocial completed on 6/29/2021 by DELANEY Mccormick, ZABRINA  for assessment/social functioning  -Chart review  -Pre round meeting with team  -Rounded with team, addressed patient needs/concerns  -Post round meeting with team  -AVS completed - will need to add in discharge date. Rescheduled psychiatry appointment for later in the day so patient can fill out intake paperwork on site.    Current Symptoms include the following: AH, delayed responses    Discharge Plan or Goal  Pending stabilization & development of a safe discharge plan.  Considerations include: Return home with outpatient providers    Barriers to Discharge  Patient requires further psychiatric stabilization due to current symptomology    Referral Status  None today    Legal Status  Patient is voluntary

## 2021-07-07 NOTE — PLAN OF CARE
Pt appeared to sleep 6.5 hours. No PRNs given or requested. No medical or behavioral events this shift.      Problem: Sleep Disturbance  Goal: Adequate Sleep/Rest  Outcome: No Change

## 2021-07-07 NOTE — PLAN OF CARE
Problem: Behavioral Health Plan of Care  Goal: Plan of Care Review  Outcome: Improving  Flowsheets (Taken 7/7/2021 1248)  Plan of Care Reviewed With: patient  Progress: improving  Patient Agreement with Plan of Care: agrees     Problem: Sensory Perception Impairment (Psychotic Signs/Symptoms)  Goal: Decreased Sensory Symptoms (Psychotic Signs/Symptoms)  Outcome: Improving  Flowsheets (Taken 7/7/2021 1248)  Mutually Determined Action Steps (Decreased Sensory Symptoms): adheres to medication regimen     Judah spent most of day in lounge watching television-periodically standing at edge of room-looking forward to discharge Friday-denies issues with Latuda-affect flat-reports euthymic mood-denies sxs of mental illness, including hallucinations

## 2021-07-07 NOTE — PLAN OF CARE
"Nursing plan of Care   Problem: Mood Impairment (Psychotic Signs/Symptoms)  Goal: Improved Mood Symptoms (Psychotic Signs/Symptoms)  Outcome: Declining  Pt has been watching TV in the lounge ; withdrawn and guarded; kept to himself and appeared in denial of his mental illness; declined his scheduled Latuda at 1800 stating \" I don't need it\" ; appeared tense but behaviorally calm; attended no group; appeared internally preoccupied; answered \"No\" to writer assessment questions; denied SI/SIB,AVH, depression and anxiety; no medication side effect was noted or reported. Will continue to monitor and assess.       "

## 2021-07-08 PROCEDURE — G0177 OPPS/PHP; TRAIN & EDUC SERV: HCPCS

## 2021-07-08 PROCEDURE — 250N000013 HC RX MED GY IP 250 OP 250 PS 637

## 2021-07-08 PROCEDURE — 124N000002 HC R&B MH UMMC

## 2021-07-08 RX ADMIN — LURASIDONE HYDROCHLORIDE 60 MG: 20 TABLET, FILM COATED ORAL at 18:45

## 2021-07-08 ASSESSMENT — ACTIVITIES OF DAILY LIVING (ADL)
LAUNDRY: WITH SUPERVISION
ORAL_HYGIENE: INDEPENDENT
DRESS: INDEPENDENT
HYGIENE/GROOMING: INDEPENDENT

## 2021-07-08 ASSESSMENT — MIFFLIN-ST. JEOR: SCORE: 1889.74

## 2021-07-08 NOTE — PLAN OF CARE
"Nursing Plan of care   Problem: Behavioral Health Plan of Care  Goal: Develops/Participates in Therapeutic [place] to Support Successful Transition  Outcome: Declining   Pt had a good shift; took his scheduled HS Latuda; was withdrawn to himself; appeared preoccupied; answered \"No\" to all  assessment questions. Denied AVH,SI/SIB, depression and anxiety. Ate dinner; appetite is excellent; denied medication side effect; will continue to monitor and assess.       Pts mother, Zo Champion called the unit at [phone] and discussed her concern of pt behavior and a plan to discharge him home tomorrow; Mom reported that pt behavior appeared to be getting worse with irritability, called her a \"liar\" and hung up a phone on her, per mom, which is not his normal behavior; pt is in denial of his mental illness but believed that he is \"Stalked\" and \"stalker in his head\"  Mom was emotional and sobbing over the phone, requested care team to call her before discharge him home tomorrow. Pt Mom can be reached at [phone].        "

## 2021-07-08 NOTE — PLAN OF CARE
Problem: Sleep Disturbance  Goal: Adequate Sleep/Rest  Outcome: No Change  Received patient in is bed sleeping at start of this shift. Came out at around 0145 for some water and back to his room. Offered but declined any sleep interventions. Patient sat quietly on his bed for a while, came out one more time for water and slept for a short time, for a total of 4.5 hours tonight.

## 2021-07-08 NOTE — PLAN OF CARE
Assessment/Intervention/Current Symtoms and Care Coordination  -Refer to psychosocial completed on 6/29/2021 by DELANEY Mccormick, ZABRINA  for assessment/social functioning  -Chart review  -Pre round meeting with team  -Rounded with team, addressed patient needs/concerns  -Post round meeting with team  -KELLY completed -  Rescheduled psychiatry appointment for later in the day so patient can fill out intake paperwork on site.    Current Symptoms include the following: AH, delayed responses    Discharge Plan or Goal  Pending stabilization & development of a safe discharge plan.  Considerations include: Return home with outpatient providers    Barriers to Discharge  Patient requires further psychiatric stabilization due to current symptomology    Referral Status  None today    Legal Status  Patient is voluntary

## 2021-07-08 NOTE — PROGRESS NOTES
".prog  .  .    ----------------------------------------------------------------------------------------------------------  [facility], [place]   Psychiatric Progress Note  Hospital Day #10     Interim History:   The patient's care was discussed with the treatment team and chart notes were reviewed.    Sleep 4.5 hours (07/08/21 0600)  Scheduled Medications: Declined Latuda 60mg.   PRN medications: PRN medications for sleep were offered, declined.      Staff Report:      'Judah spent most of day in lounge watching television-periodically standing at edge of room-looking forward to discharge Friday-denies issues with Latuda-affect flat-reports euthymic mood-denies sxs of mental illness, including hallucinations.\"    \"Pt has been watching TV in the lounge ; withdrawn and guarded; kept to himself and appeared in denial of his mental illness; declined his scheduled Latuda at 1800 stating \" I don't need it\" ; appeared tense but behaviorally calm; attended no group; appeared internally preoccupied; answered \"No\" to writer assessment questions; denied SI/SIB,AVH, depression and anxiety; no medication side effect was noted or reported. Will continue to monitor and assess.\"                  Patient Interview:   Judah says that his mood is \"good\". He reports that his sleep last night was \"fine\", but \"couldn't get a lot of sleep\". When questioned about why he did not take his medication yesterday, he said that he \"felt like [he] was doing ok\" without them and that he \"just doesn't think [he] needs them\". He says that he is feeling a bit better and that his depression has improved. He was encouraged ans pt agreed to take his medications tonight and confirmed the discharge plan for tomorrow. He feels safe on the unit.    -------------   The risks, benefits, alternatives and side effects of any medication changes have been discussed and are understood by the patient and other " "caregivers.        Review of systems:     ROS was negative unless noted above.          Allergies:     Allergies   Allergen Reactions     Augmentin Nausea     Clindamycin Nausea and Vomiting            Psychiatric Examination:   /84   Pulse 103   Temp 97.8  F (36.6  C) (Tympanic)   Resp 12   Ht 1.676 m (5' 6\")   Wt 101.1 kg (222 lb 14.4 oz)   SpO2 98%   BMI 35.98 kg/m    Weight is 222 lbs 14.4 oz  Body mass index is 35.98 kg/m .    MENTAL STATUS EXAM    Appearance:  Awake, dressed in hospital scrubs, has long hair, well healed scar on right side of forehead   Muscle Strength and Tone: not assessed   Gait and Station: Normal Gait  Behavior (Psychomotor):  No tremor seen  Eye Contact:  Poor eye contact but improving, occassionally looking down    Speech:  Speech delay improving but still some delay, low rate, low tone, more expressive  Mood:  patient states that mood is \"good\"  Affect:  Blunted affect, congruent, lower reactivity but improving since admission.   Attitude: more guarded today, cooperative   Thought Process:  withdrawn but improving, goal-directed, linear  Thought Content:  No evidence of suicidal or homicidal ideation; no CAH   Associations:  Limited   Insight:  fair  Judgment:  fair    Oriented to: person, place, time  Attention Span and Concentration:  Shortened attention span,fair  Recent and Remote Memory:  Memory grossly intact  Language: Fluent in English with appropriate syntax          Labs:     No results found for this or any previous visit (from the past 24 hour(s)).     Assessment    Principal Diagnosis:   #schizophrenia   -Latuda PO 60 mg QHS    Diagnostic Impression:   Judah Candelaria is a 36 year old male previously diagnosed with major depressive disorder with psychosis who [facility]  [facility]  with auditory hallucinations and paranoid delusion in the context of 2 months of medication non-adherence and social withdrawal during this time. He " presented to the ED on 6/27/2021 stating that he has been hearing whispering voices for the last two weeks along with feeling paranoid about being stalked and was transferred to [facility]. He had a similar episode a 2 years ago where he was hospitalized for 10 days, stabilized on aripiprazole and given a diagnosis of major depressive disorder with psychosis. He is currently prescribed Aripiprazole for psychosis and trazodone for poor sleep, however he discontinued both medications 3 months ago in accordance with recommendations from his medication . Given these findings along with the recent auditory hallucinations and paranoid delusions, the patient's presentation is most consistent with schizophrenia. Differential includes previously diagnosed MDD with psychotic features (although does not meet criteria for MDD currently), schizoaffective disorder, substance induced psychosis.                 Psychiatric Hospital course:   Judah Candelaria was admitted to [facility] as a voluntary patient. Upon admission to the ED, patient was given 5 mg of Olanzapine and on 6/28 was given hydroxyzine for anxiety and Trazodone for poor sleep. Patient appeared to sleep 7 hours last night. On 6/29, he was placed on Lurasidone  20 mg/day for psychosis due to unwanted weight gain from previously prescribed Aripiprazole. He required he required PRN Olanzapine 10mg po on 06/28t, Trazodone 100mg.  On 6/30, the patients mood was more depressed and he was more guarded. Given this, Lurasidone was increased to 40 mg/day. Patient disagreed with plan of increasing Lurasidone to 60mg PO on 7/01. Patient declined to be interviewed on 7/2. On 7/6, the patient agreed to increase the dose of Lurasidone to 60 mg PO for improvement of mood symptoms. 7.7 Plan for discharge on 7/9 was coordinated with mother. On 7/7, he refused Lurasidone and is requesting to be discharged.     Discontinued Medications (& Rationale):  None    Medical  course   Patient was medically cleared prior to admission.    Data:   Negative Urinary tox 6/28    Consults:   none    Plan     Today's Changes:  - Continue Lurasidone to 60 mg/day PO  - Family agreed to set up discharge home for Friday 7/9/2021    Psychotropic Medications:  Scheduled Psych Medications:  - Lurasidone 60 mg/day PO    PRN Psych Medications  - Hydroxyzine 25 mg PRN Q4H  - Olanzapine 10 mg PO/IM prn Q2H severe agitation/psychosis  - Trazodone 50 mg PRN QHS    Medical diagnoses to be addressed this admission:    #: schizophrenia     Patient will be treated in therapeutic milieu with appropriate individual and group therapies as described.      Legal Status:   Orders Placed This Encounter      Voluntary      Safety Assessment:   Behavioral Orders   Procedures     Code 1 - Restrict to Unit     Routine Programming     As clinically indicated     Status 15     Every 15 minutes.       Disposition:   Plan to discharge home Friday to mother.     The patient was seen and the plan was discussed with the attending physician.     This patient was seen and discussed with my attending physician.    Vincenzo Ness  MS3    Quintin Rojas MD  Psychiatry PGY-1     Resident Attestation:   I was present with the medical student who participated in the service and in the documentation of the note. I have verified the history and personally performed the exam and medical decision making. I agree with the assessment and plan of care as documented in the note.

## 2021-07-08 NOTE — PLAN OF CARE
Problem: General Rehab Plan of Care  Goal: Occupational Therapy Goals  Description: The patient and/or their representative will achieve their patient-specific goals related to the plan of care.  The patient-specific goals include:    Will attend OT groups and participate actively in all OT opportunities. Will assess and set goals.    Pt attended 1 out of 3 OT groups offered. Pt actively participated in occupational therapy clinic with 5 patients total x60 minutes. Pt was able to ask for assistance as needed, and independently initiate a familiar, structured, creative expression task. Pt demonstrated good focus, planning, and attention to detail. Minimal interactions with peers observed, though he accepted a compliment on his project. Calm with a flat affect.

## 2021-07-08 NOTE — PLAN OF CARE
Problem: Behavioral Health Plan of Care  Goal: Plan of Care Review  Outcome: Declining  Flowsheets (Taken 7/8/2021 1056)  Plan of Care Reviewed With: patient  Progress: declining  Patient Agreement with Plan of Care: (Does not believe he is ill) disagrees (describe)     Problem: Sensory Perception Impairment (Psychotic Signs/Symptoms)  Goal: Decreased Sensory Symptoms (Psychotic Signs/Symptoms)  Outcome: Declining  Flowsheets (Taken 7/8/2021 1056)  Mutually Determined Action Steps (Decreased Sensory Symptoms): (refused evening meds) other (see comments)     Received call from mom stating Judah called her this AM asking her to come to hospital to bring him home-does not believe he is ill or that he needs tx-Judah sits quietly on edge of lounge watching others-Attended one grp late morning-denies all sxs-guarded in interactions-minimal interactions with peers

## 2021-07-09 PROCEDURE — 250N000013 HC RX MED GY IP 250 OP 250 PS 637

## 2021-07-09 PROCEDURE — 124N000002 HC R&B MH UMMC

## 2021-07-09 PROCEDURE — 99232 SBSQ HOSP IP/OBS MODERATE 35: CPT | Mod: GC | Performed by: PSYCHIATRY & NEUROLOGY

## 2021-07-09 RX ADMIN — LURASIDONE HYDROCHLORIDE 60 MG: 20 TABLET, FILM COATED ORAL at 17:46

## 2021-07-09 ASSESSMENT — ACTIVITIES OF DAILY LIVING (ADL)
ORAL_HYGIENE: INDEPENDENT
LAUNDRY: WITH SUPERVISION
DRESS: SCRUBS (BEHAVIORAL HEALTH);INDEPENDENT
HYGIENE/GROOMING: INDEPENDENT

## 2021-07-09 NOTE — PLAN OF CARE
Problem: Sleep Disturbance  Goal: Adequate Sleep/Rest  7/9/2021 0627 by Fabi Harris, RN  Outcome: No Change  7/9/2021 0625 by Fabi Harris, RN  Outcome: No Change   Pt slept 6.75 hours this night,respirations easy

## 2021-07-09 NOTE — PLAN OF CARE
Assessment/Intervention/Current Symtoms and Care Coordination  -Refer to psychosocial completed on 6/29/2021 by DELANEY Mccormick, ZABRINA  for assessment/social functioning  -Chart review  -Pre round meeting with team  -Rounded with team, addressed patient needs/concerns  -Post round meeting with team  -KELLY completed -  Rescheduled psychiatry appointment for later in the day so patient can fill out intake paperwork on site.    Current Symptoms include the following: AH, delayed responses    Discharge Plan or Goal  Pending stabilization & development of a safe discharge plan.  Considerations include: Return home with outpatient providers    Barriers to Discharge  Patient requires further psychiatric stabilization due to current symptomology - little insight to illness.    Referral Status  None today - patient has been set up with a new med prescriber at [facility].    Legal Status  Patient is voluntary

## 2021-07-09 NOTE — PLAN OF CARE
"  Problem: Mood Impairment (Psychotic Signs/Symptoms)  Goal: Improved Mood Symptoms (Psychotic Signs/Symptoms)  Outcome: No Change  Note: Pt visible in the milieu but withdrawn from others. He denies the presence of mental health symptoms, including SI/SIB and AH/VH. He appears distracted and guarded in his interactions. When asked if he has met with his provider, he responds, \"I don't want to.\" However, pt did not share rationale for not meeting with his team today. This writer spoke with pt mother and she expressed concerns that pt is not completely stable on his medications. She states that pt shared with her that pt feels his voices are not in his head, but they are a person stalking him. Communicated this to patient care team. Will continue to assess and offer support.      "

## 2021-07-09 NOTE — PROGRESS NOTES
"  .  .    ----------------------------------------------------------------------------------------------------------  [facility], [place]   Psychiatric Progress Note  Hospital Day #11     Interim History:   The patient's care was discussed with the treatment team and chart notes were reviewed.    Sleep 6.75 hours (07/09/21 0620)  Scheduled Medications: compliant with Latuda 60mg.   PRN medications: No PRN medications requested/given.    Staff Report:     \"Received call from mom stating Judah called her this AM asking her to come to hospital to bring him home-does not believe he is ill or that he needs tx-Judah sits quietly on edge of lounge watching others-Attended one grp late morning-denies all sxs-guarded in interactions-minimal interactions with peers.\"    \"Pts mother, Zo Champion called the unit at 1612 and discussed her concern of pt behavior and a plan to discharge him home tomorrow; Mom reported that pt behavior appeared to be getting worse with irritability, called her a \"liar\" and hung up a phone on her, per mom, which is not his normal behavior; pt is in denial of his mental illness but believed that he is \"Stalked\" and \"stalker in his head\"  Mom was emotional and sobbing over the phone.She states that pt shared with her that pt feels his voices are not in his head, but they are a person stalking him. Communicated this to patient care team. Will continue to assess and offer support. \"                    Patient Interview:   Patient refused to be interviewed after multiple attempts. He remains very guarded and irritable. When approached and asked about discussing the plan for discharge, he stated that \"there is no plan\". He was informed about his volentary status.   Ok to discharge if patient requests it over the weekend.      -------------   The risks, benefits, alternatives and side effects of any medication changes have been discussed and are understood by the patient " "and other caregivers.        Review of systems:     ROS was negative unless noted above.          Allergies:     Allergies   Allergen Reactions     Augmentin Nausea     Clindamycin Nausea and Vomiting            Psychiatric Examination:   /86 (BP Location: Left arm)   Pulse 111   Temp 97  F (36.1  C) (Tympanic)   Resp 12   Ht 1.676 m (5' 6\")   Wt 100.7 kg (222 lb)   SpO2 98%   BMI 35.83 kg/m    Weight is 222 lbs 0 oz  Body mass index is 35.83 kg/m .    MENTAL STATUS EXAM    Appearance:  Awake, dressed in hospital scrubs, has long hair, well healed scar on right side of forehead, wandering around the unit   Muscle Strength and Tone: not assessed   Gait and Station: Normal Gait  Behavior (Psychomotor):  No tremor seen  Eye Contact:  Poor eye contact but improving, occassionally looking down    Speech:  Speech delay improving but still some delay, low rate, low tone  Mood:  irritable   Affect:  Blunted affect,  low reactivity.   Attitude: more guarded today, non-cooperative   Thought Process:  withdrawn, seems to be responding to internal stimuli  Thought Content:  No evidence of suicidal or homicidal ideation; no CAH   Associations:  Limited   Insight:  poor, lacks insight to his illness  Judgment:  poor    Oriented to: person, place, time  Attention Span and Concentration:  Shortened attention span,fair  Recent and Remote Memory:  Memory grossly intact  Language: Fluent in English with appropriate syntax          Labs:     No results found for this or any previous visit (from the past 24 hour(s)).     Assessment    Principal Diagnosis:   #schizophrenia   -Latuda PO 60 mg QHS    Diagnostic Impression:   Judah Candelaria is a 36 year old male previously diagnosed with major depressive disorder with psychosis who presented to [facility] psychiatry on  06/29/2021  with auditory hallucinations and paranoid delusion in the context of 2 months of medication non-adherence and social withdrawal during this time. " He presented to the ED on 6/27/2021 stating that he has been hearing whispering voices for the last two weeks along with feeling paranoid about being stalked and was transferred to [facility]. He had a similar episode a 2 years ago where he was hospitalized for 10 days, stabilized on aripiprazole and given a diagnosis of major depressive disorder with psychosis. He is currently prescribed Aripiprazole for psychosis and trazodone for poor sleep, however he discontinued both medications 3 months ago in accordance with recommendations from his medication . Given these findings along with the recent auditory hallucinations and paranoid delusions, the patient's presentation is most consistent with schizophrenia. Differential includes previously diagnosed MDD with psychotic features (although does not meet criteria for MDD currently), schizoaffective disorder, substance induced psychosis.                 Psychiatric Hospital course:   Judah Candelaria was admitted to [facility] as a voluntary patient. Upon admission to the ED, patient was given 5 mg of Olanzapine and on 6/28 was given hydroxyzine for anxiety and Trazodone for poor sleep. Patient appeared to sleep 7 hours last night. On 6/29, he was placed on Lurasidone  20 mg/day for psychosis due to unwanted weight gain from previously prescribed Aripiprazole. He required he required PRN Olanzapine 10mg po on 06/28t, Trazodone 100mg.  On 6/30, the patients mood was more depressed and he was more guarded. Given this, Lurasidone was increased to 40 mg/day. Patient disagreed with plan of increasing Lurasidone to 60mg PO on 7/01. Patient declined to be interviewed on 7/2. On 7/6, the patient agreed to increase the dose of Lurasidone to 60 mg PO for improvement of mood symptoms. On 7/7, he refused Lurasidone and is requesting to be discharged. Pt took Lurasidone on 7/8, however on 7/9 he refused to meet with the treatment team and coordinate plan to discharge.  Patient was informed about his rights and voluntary status.    Discontinued Medications (& Rationale):  None    Medical course   Patient was medically cleared prior to admission.    Data:   Negative Urinary tox 6/28    Consults:   none    Plan     Today's Changes:  - Continue Lurasidone  60 mg/day PO  - Plan to discharge following stabilization/ at patient request over the weekend.    Psychotropic Medications:  Scheduled Psych Medications:  - Lurasidone 60 mg/day PO    PRN Psych Medications  - Hydroxyzine 25 mg PRN Q4H  - Olanzapine 10 mg PO/IM prn Q2H severe agitation/psychosis  - Trazodone 50 mg PRN QHS    Medical diagnoses to be addressed this admission:    #: schizophrenia     Patient will be treated in therapeutic milieu with appropriate individual and group therapies as described.      Legal Status:   Orders Placed This Encounter      Voluntary      Safety Assessment:   Behavioral Orders   Procedures     Code 1 - Restrict to Unit     Routine Programming     As clinically indicated     Status 15     Every 15 minutes.       Disposition:   Plan to discharge following stabilization and safe discharge plan.    The patient was seen and the plan was discussed with the attending physician.     This patient was seen and discussed with my attending physician.    Vincenzo Ness  MS3    Quintin Rojas MD  Psychiatry PGY-1     Resident Attestation:   I was present with the medical student who participated in the service and in the documentation of the note. I have verified the history and personally performed the exam and medical decision making. I agree with the assessment and plan of care as documented in the note.      Attestation:  This patient has been seen and evaluated by me, Bakari Miller MD.  I have discussed this patient with the house staff team including the resident and medical student and I agree with the findings and plan in this note.    I have reviewed today's vital signs, medications, labs and imaging.  Bakari Miller MD , PhD.

## 2021-07-09 NOTE — PLAN OF CARE
Problem: Sleep Disturbance  Goal: Adequate Sleep/Rest  Outcome: No Change   Pt slept 6.75 hrs this night,respirations easy

## 2021-07-09 NOTE — PLAN OF CARE
"Nursing plan of care   Problem: Mood Impairment (Psychotic Signs/Symptoms)  Goal: Improved Mood Symptoms (Psychotic Signs/Symptoms)  Outcome: No Change   Pt has been visible in the milieu; spent most of the shift in the lounge watching TV; withdrawn and kept to himself; presented with tense affect and guarded; took scheduled HS 60 mg Latuda with encouragement; Pt Mom called the unit and discussed about discharge planning and information was provided from the Dr note; denied SI/SIB,AVH, depression and anxiety; denied medication side effect; answered \"No\" to writers assessment questions; pt appeared to be in denial of his MH symptom;  ate dinner and snack; appetite is good; will continue to monitor and assess.   "

## 2021-07-09 NOTE — PROGRESS NOTES
Pt stated that he wants to go home and appeared irritable earlier in the shift. Pt spent most of evening on the unit, did not engage much in conversation with other peers. Pt was prompted for check in twice from writer. Did not engage much and responded with short, concise answers.

## 2021-07-10 PROCEDURE — 250N000013 HC RX MED GY IP 250 OP 250 PS 637

## 2021-07-10 PROCEDURE — 124N000002 HC R&B MH UMMC

## 2021-07-10 RX ADMIN — LURASIDONE HYDROCHLORIDE 60 MG: 20 TABLET, FILM COATED ORAL at 19:03

## 2021-07-10 ASSESSMENT — ACTIVITIES OF DAILY LIVING (ADL)
DRESS: SCRUBS (BEHAVIORAL HEALTH);INDEPENDENT
LAUNDRY: WITH SUPERVISION
ORAL_HYGIENE: INDEPENDENT
HYGIENE/GROOMING: INDEPENDENT

## 2021-07-10 NOTE — PLAN OF CARE
Problem: Behavioral Health Plan of Care  Goal: Adheres to Safety Considerations for Self and Others  Outcome: Improving     Pt visible in the milieu for the majority of the day but socially withdrawn. He denies the presence of mental health symptoms. Pt appears guarded when interacting with others and answers questions with primarily one-word answers. No additional concerns at this time. Will continue to assess and offer support.

## 2021-07-10 NOTE — PLAN OF CARE
Problem: Sleep Disturbance  Goal: Adequate Sleep/Rest  Outcome: No Change   Pt slept 6,25 hours intermittently, respirations easy on all checks,pt offers no report of discomfort

## 2021-07-11 PROCEDURE — 124N000002 HC R&B MH UMMC

## 2021-07-11 PROCEDURE — 250N000013 HC RX MED GY IP 250 OP 250 PS 637: Performed by: STUDENT IN AN ORGANIZED HEALTH CARE EDUCATION/TRAINING PROGRAM

## 2021-07-11 PROCEDURE — 250N000013 HC RX MED GY IP 250 OP 250 PS 637

## 2021-07-11 RX ADMIN — ACETAMINOPHEN 650 MG: 325 TABLET, FILM COATED ORAL at 06:02

## 2021-07-11 RX ADMIN — LURASIDONE HYDROCHLORIDE 60 MG: 20 TABLET, FILM COATED ORAL at 18:42

## 2021-07-11 ASSESSMENT — ACTIVITIES OF DAILY LIVING (ADL)
ORAL_HYGIENE: INDEPENDENT
HYGIENE/GROOMING: INDEPENDENT
LAUNDRY: WITH SUPERVISION
DRESS: SCRUBS (BEHAVIORAL HEALTH);INDEPENDENT

## 2021-07-11 ASSESSMENT — MIFFLIN-ST. JEOR: SCORE: 1893.36

## 2021-07-11 NOTE — PLAN OF CARE
Nursing plan of care   Problem: Mood Impairment (Psychotic Signs/Symptoms)  Goal: Improved Mood Symptoms (Psychotic Signs/Symptoms)  Outcome: Improving   Pt presented with calm and relaxed mood compared to the last couple of days; reported medication is working; rated depression 5/10 ; stated anxiety is not an issue; spent most of the shift in the lounge watching TV/Movies; took shower and well groomed; medication compliant; denied medication side effect; took scheduled medication; appetite is good; denied further concern to report; will continue to monitor and assess

## 2021-07-11 NOTE — PLAN OF CARE
"Nursing plan of care   Problem: Mood Impairment (Psychotic Signs/Symptoms)  Goal: Improved Mood Symptoms (Psychotic Signs/Symptoms)  Outcome: No Change   Pt was visible in the milieu; spent most of the shift sitting in the lounge and staring around in the hallway ; presented with tense affect and guarded; appeared internally occupied; when asked MH assessment questions, pt replied, \" I am fine, I don't have anything\" ; when offered his scheduled medication replied, \" I don't need it\"; pt was noted for change in mood and affect in terms of his presentation; pt is in denial of his MH symptoms. Pt was reproached to take his medication and when asked if he is interested to take replied, \" I suppose\" and took his medication; no safety concern to report this shift; will continue to monitor and assess.        At 1654, pt mother called the unit and provided collateral information on the pt that he was discussed with his sister, Maya. Per Mom, pt told Maya about his intention of staying way form his family and live in his car driving around. Pt was also told his sister  believing that he is \"stalked\" by the entire care team, his family and not to trust anybody. Pt appeared to be frustrated with being in hospital and what his family believe of his Mental illness. According to his Mom, pt requested Maya, a pregnant sister to visit their home to take shower and to maintain personal care, but when Maya told him that she is expecting a baby and it is difficult to accommodate his request pt told her that she will never see him again and hung up a phone on her. Pt also call his sister to pick him up; Family requested for consult from care team before discharge. Pt MomZo  can be reached at  [phone].           "

## 2021-07-11 NOTE — PLAN OF CARE
"  Problem: Mood Impairment (Psychotic Signs/Symptoms)  Goal: Improved Mood Symptoms (Psychotic Signs/Symptoms)  Outcome: No Change  Note: Pt visible in the milieu but withdrawn socially. However, he spent much of the day watching a movie with fellow patients in the lounge. He appears guarded with this writer when asked about his discharge disposition, stating \"I don't know.\" When asked if he hears or sees things that others might not, pt denies. He appears distracted and often avoids eye contact. When asked if he feels safe and will keep himself safe pt responded, \"I guess.\" Pt agreeable to speak with staff if he feels he is unsafe. No additional concerns at this time. Will continue to assess and offer support.      "

## 2021-07-12 PROCEDURE — H2032 ACTIVITY THERAPY, PER 15 MIN: HCPCS

## 2021-07-12 PROCEDURE — 250N000013 HC RX MED GY IP 250 OP 250 PS 637

## 2021-07-12 PROCEDURE — 99232 SBSQ HOSP IP/OBS MODERATE 35: CPT | Mod: GC | Performed by: PSYCHIATRY & NEUROLOGY

## 2021-07-12 PROCEDURE — 124N000002 HC R&B MH UMMC

## 2021-07-12 RX ADMIN — LURASIDONE HYDROCHLORIDE 60 MG: 20 TABLET, FILM COATED ORAL at 18:19

## 2021-07-12 ASSESSMENT — ACTIVITIES OF DAILY LIVING (ADL)
HYGIENE/GROOMING: INDEPENDENT
DRESS: INDEPENDENT
HYGIENE/GROOMING: INDEPENDENT
DRESS: INDEPENDENT
LAUNDRY: WITH SUPERVISION
LAUNDRY: WITH SUPERVISION
ORAL_HYGIENE: INDEPENDENT
ORAL_HYGIENE: INDEPENDENT

## 2021-07-12 NOTE — PLAN OF CARE
Judah spending most of time sitting or standing silently in lounge-appears preoccupied-guarded on approach-difficult to engage in conversation-brief responses-is pleasant in 1:1-agreeable to Invega for tx of depression and believes has helped to improve his mood-looking forward to family meeting to plan for discharge

## 2021-07-12 NOTE — PLAN OF CARE
"  Problem: Behavioral Health Plan of Care  Goal: Develops/Participates in Therapeutic Tobias to Support Successful Transition  Outcome: No Change    Nursing Assessment    Recent Vitals: B/P:127/90  T:97.5  P:111 R: 16      General Shift Summary  Pt is in milieu although keeps to self, does not interact with staff or peers. Pt appears possibly distracted by internal stimuli but is not outwardly responding. Pt denies hearing voices.   Pt is very brief with writer, denies all sx, says they are, \"doing fine.\"      Patient is medication compliant and reported no side effects are. No PRN medications given this shift.     Hygiene and appetite are appropriate.       Jarvis Toledo RN       "

## 2021-07-12 NOTE — PROGRESS NOTES
"  .  .    ----------------------------------------------------------------------------------------------------------  [facility], [place]   Psychiatric Progress Note  Hospital Day #14     Interim History:   The patient's care was discussed with the treatment team and chart notes were reviewed.    Sleep 5.75 hours (07/12/21 0523)  Scheduled Medications: compliant with Latuda 60mg.   PRN medications: No PRN medications requested/given.    Staff Report:     \"Pt visible in the milieu but withdrawn socially. However, he spent much of the day watching a movie with fellow patients in the lounge. He appears guarded with this writer when asked about his discharge disposition, stating \"I don't know.\" When asked if he hears or sees things that others might not, pt denies. He appears distracted and often avoids eye contact. When asked if he feels safe and will keep himself safe pt responded, \"I guess.\" Pt agreeable to speak with staff if he feels he is unsafe. No additional concerns at this time. Will continue to assess and offer support.\"     \"Pt was visible in the milieu; spent most of the shift sitting in the lounge and staring around in the hallway ; presented with tense affect and guarded; appeared internally occupied; when asked MH assessment questions, pt replied, \" I am fine, I don't have anything\" ; when offered his scheduled medication replied, \" I don't need it\"; pt was noted for change in mood and affect in terms of his presentation; pt is in denial of his MH symptoms. Pt was reproached to take his medication and when asked if he is interested to take replied, \" I suppose\" and took his medication; no safety concern to report this shift; will continue to monitor and assess.         At 1654, pt mother called the unit and provided collateral information on the pt that he was discussed with his sister, Maya. Per Mom, pt told Maya about his intention of staying way form his family and " "live in his car driving around. Pt was also told his sister  believing that he is \"stalked\" by the entire care team, his family and not to trust anybody. Pt appeared to be frustrated with being in hospital and what his family believe of his Mental illness. According to his Mom, pt requested Maya, a pregnant sister to visit their home to take shower and to maintain personal care, but when Maya told him that she is expecting a baby and it is difficult to accommodate his request pt told her that she will never see him again and hung up a phone on her. Pt also call his sister to pick him up; Family requested for consult from care team before discharge.\"                  Patient Interview:   Judah says that he is \"good\" and that his weekend was \"ok\". He says that his conversation with a family member was \"not good\" and that he's upset that \"no one will come get me\". He says that his family \"wants me on medication that I don't need\". He says that he is being stalked and that \"no one is listening to him\". He is continuing to hear voices and believes that they are \"not in my head\". He says that the voices have continued even while taking medication during his stay. He does report that there was a period when he could not hear them. Prior to his admission, he heard a voice say \"don't touch me\" as he said goodbye to a friend and says that others around him heard the voice as well. Upon addressing AH, he left the interview and said \"I know they are real\". Patient continues to be guarded. Denies SI, HI.     -------------   The risks, benefits, alternatives and side effects of any medication changes have been discussed and are understood by the patient and other caregivers.        Review of systems:     ROS was negative unless noted above.          Allergies:     Allergies   Allergen Reactions     Augmentin Nausea     Clindamycin Nausea and Vomiting            Psychiatric Examination:   /88 (BP Location: Left arm)  " " Pulse 103   Temp 98.1  F (36.7  C) (Oral)   Resp 16   Ht 1.676 m (5' 6\")   Wt 101.1 kg (222 lb 12.8 oz)   SpO2 96%   BMI 35.96 kg/m    Weight is 222 lbs 12.8 oz  Body mass index is 35.96 kg/m .    MENTAL STATUS EXAM    Appearance:  Awake, dressed in hospital scrubs, has long hair, well healed scar on right side of forehead, wandering around the unit. Looking down throughout interview.  Muscle Strength and Tone: not assessed   Gait and Station: Normal Gait  Behavior (Psychomotor):  No tremor seen  Eye Contact:  Poor eye contact but improving, mostly looking down    Speech:  Speech delay improving but still some delay, low rate but improving, low tone but improving  Mood:  irritable, insistent that AH are real voices   Affect:  Blunted affect,  low reactivity.   Attitude: guarded, non-cooperative   Thought Process:  withdrawn, seems to be responding to internal stimuli  Thought Content:  No evidence of suicidal or homicidal ideation; no CAH   Associations:  Limited   Insight:  poor, lacks insight to his illness, believes that AH are real voices  Judgment:  poor    Oriented to: person, place, time  Attention Span and Concentration:  Shortened attention span,fair  Recent and Remote Memory:  Memory grossly intact  Language: Fluent in English with appropriate syntax          Labs:     No results found for this or any previous visit (from the past 24 hour(s)).     Assessment    Principal Diagnosis:   #schizophrenia   -Latuda PO 60 mg QHS    Diagnostic Impression:   Judah Candelaria is a 36 year old male previously diagnosed with major depressive disorder with psychosis who presented to [facility] psychiatry on  06/29/2021  with auditory hallucinations and paranoid delusion in the context of 2 months of medication non-adherence and social withdrawal during this time. He presented to the ED on 6/27/2021 stating that he has been hearing whispering voices for the last two weeks along with feeling paranoid about being " stalked and was transferred to [facility]. He had a similar episode a 2 years ago where he was hospitalized for 10 days, stabilized on aripiprazole and given a diagnosis of major depressive disorder with psychosis. He is currently prescribed Aripiprazole for psychosis and trazodone for poor sleep, however he discontinued both medications 3 months ago in accordance with recommendations from his medication . Given these findings along with the recent auditory hallucinations and paranoid delusions, the patient's presentation is most consistent with schizophrenia. Differential includes previously diagnosed MDD with psychotic features (although does not meet criteria for MDD currently), schizoaffective disorder, substance induced psychosis.                 Psychiatric Hospital course:   Judah Candelaria was admitted to [facility] as a voluntary patient. Upon admission to the ED, patient was given 5 mg of Olanzapine and on 6/28 was given hydroxyzine for anxiety and Trazodone for poor sleep. Patient appeared to sleep 7 hours last night. On 6/29, he was placed on Lurasidone  20 mg/day for psychosis due to unwanted weight gain from previously prescribed Aripiprazole. He required he required PRN Olanzapine 10mg po on 06/28t, Trazodone 100mg.  On 6/30, the patients mood was more depressed and he was more guarded. Given this, Lurasidone was increased to 40 mg/day. Patient disagreed with plan of increasing Lurasidone to 60mg PO on 7/01. Patient declined to be interviewed on 7/2. On 7/6, the patient agreed to increase the dose of Lurasidone to 60 mg PO for improvement of mood symptoms. On 7/7, he refused Lurasidone and is requesting to be discharged. Pt took Lurasidone on 7/8, however on 7/9 he refused to meet with the treatment team and coordinate plan to discharge. Patient was informed about his rights and voluntary status.    Discontinued Medications (& Rationale):  None    Medical course   Patient was medically  cleared prior to admission.    Data:   Negative Urinary tox 6/28    Consults:   none    Plan     Today's Changes:  -Schedule family meeting with treatment team on 7/14 to discuss further plans  -Plan to discharge following stabilization/ at patient request.      Psychotropic Medications:  Scheduled Psych Medications:  - Lurasidone 60 mg/day PO    PRN Psych Medications  - Hydroxyzine 25 mg PRN Q4H  - Olanzapine 10 mg PO/IM prn Q2H severe agitation/psychosis  - Trazodone 50 mg PRN QHS    Medical diagnoses to be addressed this admission:    #: schizophrenia     Patient will be treated in therapeutic milieu with appropriate individual and group therapies as described.      Legal Status:   Orders Placed This Encounter      Voluntary      Safety Assessment:   Behavioral Orders   Procedures     Code 1 - Restrict to Unit     Routine Programming     As clinically indicated     Status 15     Every 15 minutes.       Disposition:   Plan to discharge following stabilization and safe discharge plan.    The patient was seen and the plan was discussed with the attending physician.     This patient was seen and discussed with my attending physician.    Vincenzo Ness  MS3    Quintin Rojas MD  Psychiatry PGY-1     Resident Attestation:   I was present with the medical student who participated in the service and in the documentation of the note. I have verified the history and personally performed the exam and medical decision making. I agree with the assessment and plan of care as documented in the note.    Attestation:  This patient has been seen and evaluated by me, Bakari Miller MD.  I have discussed this patient with the house staff team including the resident and medical student and I agree with the findings and plan in this note.    I have reviewed today's vital signs, medications, labs and imaging. Bakari Miller MD , PhD.

## 2021-07-12 NOTE — PLAN OF CARE
Problem: Behavioral Health Plan of Care  Goal: Plan of Care Review  7/12/2021 1449 by Keiko Robles RN  Outcome: Improving  Flowsheets (Taken 7/12/2021 1449)  Plan of Care Reviewed With: patient  Patient Agreement with Plan of Care: (Agrees to take Latuda for improvement of mood) agrees with comment (describe)     Problem: Mood Impairment (Psychotic Signs/Symptoms)  Goal: Improved Mood Symptoms (Psychotic Signs/Symptoms)  7/12/2021 1449 by Keiko Robles RN  Outcome: Improving  Flowsheets (Taken 7/12/2021 1449)  Mutually Determined Action Steps (Improved Mood Symptoms): acknowledges progress     Problem: Sensory Perception Impairment (Psychotic Signs/Symptoms)  Goal: Decreased Sensory Symptoms (Psychotic Signs/Symptoms)  7/12/2021 1449 by Keiko Robles RN  Outcome: Improving  Flowsheets (Taken 7/12/2021 1449)  Mutually Determined Action Steps (Decreased Sensory Symptoms): adheres to medication regimen     Please see note timed for earlier today

## 2021-07-12 NOTE — PLAN OF CARE
Problem: Sleep Disturbance  Goal: Adequate Sleep/Rest  Outcome: No Change   Pt slept 5,75 hours this night intermittently,no concerns were verbalized to this nurse

## 2021-07-12 NOTE — PLAN OF CARE
Assessment/Intervention/Current Symtoms and Care Coordination    Attended team meeting and reviewed chart notes.    Pt continues to exhibit symptoms.    Pt will be stabilized with medication regimen.    Pt's mother and father will be here on Wednesday @ 2:00pm for a family meeting.      Discharge Plan or Goal  To home versus IRTs      Barriers to Discharge   Pt needs stabilization      Referral Status  None made    Legal Status  voluntary

## 2021-07-12 NOTE — PLAN OF CARE
"Music Therapy Group note    Total time in session: 70 minutes    Number of patients in group: 5    Scope of service: psychodynamic     Patient progress: no visible change    Intervention: [facility] [place]     Goal of group: patient's goal (what he wanted from group) was \"To Relax\"    Patient response/reaction to treatment intervention(s): Judah voluntarily came to group today, but opted to be more of a receptive participant.  He stated his goal was \"to relax\" and that he felt \"good\" when MT checked in in the middle of group.  He did not select any music, so MT cannot comment on that at this time.  He left the second group when 2 peers left, slightly before the end of second afternoon session.      Tracy Sánchez, MT-BC  Board-Certified Music Therapist           "

## 2021-07-13 LAB — SARS-COV-2 RNA RESP QL NAA+PROBE: NEGATIVE

## 2021-07-13 PROCEDURE — 250N000013 HC RX MED GY IP 250 OP 250 PS 637

## 2021-07-13 PROCEDURE — 124N000002 HC R&B MH UMMC

## 2021-07-13 PROCEDURE — 99232 SBSQ HOSP IP/OBS MODERATE 35: CPT | Mod: GC | Performed by: PSYCHIATRY & NEUROLOGY

## 2021-07-13 PROCEDURE — 87635 SARS-COV-2 COVID-19 AMP PRB: CPT | Performed by: PSYCHIATRY & NEUROLOGY

## 2021-07-13 RX ADMIN — LURASIDONE HYDROCHLORIDE 60 MG: 20 TABLET, FILM COATED ORAL at 17:27

## 2021-07-13 ASSESSMENT — MIFFLIN-ST. JEOR: SCORE: 1881.57

## 2021-07-13 NOTE — PLAN OF CARE
Problem: Sleep Disturbance  Goal: Adequate Sleep/Rest  Outcome: No Change   Rec'd pt. Sleeping w/ regular non-labored respirations and no observed distress as shift commenced.  Observed to have slept for approx 6 hrs overnight w/ no reported discomfort or distress.  Safe, therapeutic environment maintained.

## 2021-07-13 NOTE — PLAN OF CARE
Assessment/Intervention/Current Symtoms and Care Coordination    Attended team meeting and reviewed chart notes.    Pt was med compliant last evening.    He is present in milieu however does little interacting with peers. Pt appears withdrawn.    Family meeting on 7/14 @ 2:00    Discharge Plan or Goal  Pt to return home      Barriers to Discharge   Pt needs further stabilization      Referral Status  None made      Legal Status  voluntary

## 2021-07-13 NOTE — PROGRESS NOTES
"  .  .    ----------------------------------------------------------------------------------------------------------  [facility], [place]   Psychiatric Progress Note  Hospital Day #15     Interim History:   The patient's care was discussed with the treatment team and chart notes were reviewed.    Sleep 5.75 hours (07/12/21 0523)  Scheduled Medications: compliant with Latuda 60mg.   PRN medications: No PRN medications requested/given.    Staff Report:     \"Pt is in milieu although keeps to self, does not interact with staff or peers. Pt appears possibly distracted by internal stimuli but is not outwardly responding. Pt denies hearing voices. Pt is very brief with writer, denies all sx, says they are, \"doing fine.\"  Patient is medication compliant and reported no side effects are. No PRN medications given this shift. Hygiene and appetite are appropriate.\"  \"Patient response/reaction to treatment intervention(s): Judah voluntarily came to group today, but opted to be more of a receptive participant.  He stated his goal was \"to relax\" and that he felt \"good\" when MT checked in in the middle of group.  He did not select any music, so MT cannot comment on that at this time.  He left the second group when 2 peers left, slightly before the end of second afternoon session. \"                  Patient Interview:   Judah says that he is \"good\" and that last night was \"fine\". He says that his mood has improved since yesterday. He says that his energy level is \"about the same\". He reports good appetite and no side effects of his medications. He says that he is concerned that the meeting with his family tomorrow, 7/13/21, may be \"uncomfortable\". He says that he's feeling less depressed compared to how he presented. He currently rates his depression as a \"7\" and a \"3\" when he presented on a scale of 1-10 with 10 being not depressed. He reports improved mood due to the Latuda and would prefer to " "maintain the current dose.       -------------   The risks, benefits, alternatives and side effects of any medication changes have been discussed and are understood by the patient and other caregivers.        Review of systems:     ROS was negative unless noted above.          Allergies:     Allergies   Allergen Reactions     Augmentin Nausea     Clindamycin Nausea and Vomiting            Psychiatric Examination:   BP (!) 127/90 (BP Location: Left arm)   Pulse 111   Temp 97.5  F (36.4  C) (Tympanic)   Resp 16   Ht 1.676 m (5' 6\")   Wt 101.1 kg (222 lb 12.8 oz)   SpO2 97%   BMI 35.96 kg/m    Weight is 222 lbs 12.8 oz  Body mass index is 35.96 kg/m .    MENTAL STATUS EXAM    Appearance:  Awake, dressed in hospital scrubs, has long hair, well healed scar on right side of forehead, wandering around the unit. Looking down throughout interview.  Muscle Strength and Tone: not assessed   Gait and Station: Normal Gait  Behavior (Psychomotor):  No tremor seen  Eye Contact:  Poor eye contact but improving, mostly looking down    Speech:  Speech delay improving but still some delay, low rate but improving, low tone but improving  Mood: states that mood is \"good\"  Affect:  Blunted affect,  low reactivity.   Attitude: guarded   Thought Process:  withdrawn, seems to be responding to internal stimuli  Thought Content:  No evidence of suicidal or homicidal ideation; no CAH   Associations:  Limited   Insight:  poor, lacks insight to his illness, believes that AH are real voices  Judgment:  poor    Oriented to: person, place, time  Attention Span and Concentration:  Shortened attention span,fair  Recent and Remote Memory:  Memory grossly intact  Language: Fluent in English with appropriate syntax          Labs:     No results found for this or any previous visit (from the past 24 hour(s)).     Assessment    Principal Diagnosis:   #schizophrenia   -Latuda PO 60 mg QHS  -Family meeting (Parents) scheduled for Wednesday 7/14 at 2 " PM      Diagnostic Impression:   Judah Candelaria is a 36 year old male previously diagnosed with major depressive disorder with psychosis who presented to [facility] on  06/29/2021  with auditory hallucinations and paranoid delusion in the context of 2 months of medication non-adherence and social withdrawal during this time. He presented to the ED on 6/27/2021 stating that he has been hearing whispering voices for the last two weeks along with feeling paranoid about being stalked and was transferred to [facility]. He had a similar episode a 2 years ago where he was hospitalized for 10 days, stabilized on aripiprazole and given a diagnosis of major depressive disorder with psychosis. He is currently prescribed Aripiprazole for psychosis and trazodone for poor sleep, however he discontinued both medications 3 months ago in accordance with recommendations from his medication . Given these findings along with the recent auditory hallucinations and paranoid delusions, the patient's presentation is most consistent with schizophrenia. Differential includes previously diagnosed MDD with psychotic features (although does not meet criteria for MDD currently), schizoaffective disorder, substance induced psychosis.                 Psychiatric Hospital course:   Judah Candelaria was admitted to [facility] as a voluntary patient. Upon admission to the ED, patient was given 5 mg of Olanzapine and on 6/28 was given hydroxyzine for anxiety and Trazodone for poor sleep. Patient appeared to sleep 7 hours last night. On 6/29, he was placed on Lurasidone  20 mg/day for psychosis due to unwanted weight gain from previously prescribed Aripiprazole. He required he required PRN Olanzapine 10mg po on 06/28t, Trazodone 100mg.  On 6/30, the patients mood was more depressed and he was more guarded. Given this, Lurasidone was increased to 40 mg/day. Patient disagreed with plan of increasing Lurasidone to 60mg PO on  7/01. Patient declined to be interviewed on 7/2. On 7/6, the patient agreed to increase the dose of Lurasidone to 60 mg PO for improvement of mood symptoms. On 7/7, he refused Lurasidone and is requesting to be discharged. Pt took Lurasidone on 7/8, however on 7/9 he refused to meet with the treatment team and coordinate plan to discharge. Patient was informed about his rights and voluntary status. 7/9 patient continued to be guarded although mentioned mood has improved.    Discontinued Medications (& Rationale):  None    Medical course   Patient was medically cleared prior to admission.    Data:   Negative Urinary tox 6/28    Consults:   none    Plan     Today's Changes:  -Continue Lurasidone 60 mg/day PO  -Family meeting scheduled for Wednesday 7/14 to discuss discharge      Psychotropic Medications:  Scheduled Psych Medications:  - Lurasidone 60 mg/day PO    PRN Psych Medications  - Hydroxyzine 25 mg PRN Q4H  - Olanzapine 10 mg PO/IM prn Q2H severe agitation/psychosis  - Trazodone 50 mg PRN QHS    Medical diagnoses to be addressed this admission:    #: schizophrenia     Patient will be treated in therapeutic milieu with appropriate individual and group therapies as described.      Legal Status:   Orders Placed This Encounter      Voluntary      Safety Assessment:   Behavioral Orders   Procedures     Code 1 - Restrict to Unit     Routine Programming     As clinically indicated     Status 15     Every 15 minutes.       Disposition:   Plan to discharge following stabilization and safe discharge plan.    The patient was seen and the plan was discussed with the attending physician.     This patient was seen and discussed with my attending physician.    Vincenzo Ness  MS3    Quintin Rojas MD  Psychiatry PGY-1     Resident Attestation:   I was present with the medical student who participated in the service and in the documentation of the note. I have verified the history and personally performed the exam and medical  decision making. I agree with the assessment and plan of care as documented in the note.    Attestation:  This patient has been seen and evaluated by me, Bakari Miller MD.  I have discussed this patient with the house staff team including the resident and medical student and I agree with the findings and plan in this note.    I have reviewed today's vital signs, medications, labs and imaging. Bakari Miller MD , PhD.

## 2021-07-13 NOTE — PLAN OF CARE
BEHAVIORAL TEAM DISCUSSION    Participants: Dr. Miller, Resident Quintin Rojas, RN Melissa Estrada, CTC Ashleigh Urias  Progress: minimal improvement  Anticipated length of stay: 4-5 days  Continued Stay Criteria/Rationale: pt needs further stabilization  Medical/Physical: see IM consult  Precautions:   Behavioral Orders   Procedures     Code 1 - Restrict to Unit     Routine Programming     As clinically indicated     Status 15     Every 15 minutes.     Plan: manage medications per psychiatry, staff to provide safe and therapeutic milieu, CTC to ensure proper follow up is in place.  Rationale for change in precautions or plan: no change

## 2021-07-13 NOTE — PLAN OF CARE
"  Problem: Behavioral Health Plan of Care  Goal: Plan of Care Review  Outcome: Improving  Flowsheets (Taken 7/13/2021 1228)  Plan of Care Reviewed With: patient  Progress: improving  Patient Agreement with Plan of Care: agrees     Problem: Mood Impairment (Psychotic Signs/Symptoms)  Goal: Improved Mood Symptoms (Psychotic Signs/Symptoms)  Outcome: Improving  Flowsheets (Taken 7/13/2021 1228)  Mutually Determined Action Steps (Improved Mood Symptoms): acknowledges progress     Judah again spending day in lounge-most of time sitting in chair watching television-less guarded-does smile in 1:1-continues only brief responses, but more spontaneous-Today responds, \"I guess so.\" to question whether he would discharge home to mom-Received call from mom this AM who expressed concern regarding statement Judah made to friend Sunday evening, \"If you're not willing to help me then you're siding with my parents! I'll find someone to get me out of here and you'll never see me again! I'll disappear off the face of the earth!\"-mom expressing fear Judah will drive off in his car and will be unable to care for himself  "

## 2021-07-14 VITALS
HEART RATE: 88 BPM | TEMPERATURE: 98.1 F | RESPIRATION RATE: 16 BRPM | HEIGHT: 66 IN | BODY MASS INDEX: 35.39 KG/M2 | SYSTOLIC BLOOD PRESSURE: 122 MMHG | WEIGHT: 220.2 LBS | OXYGEN SATURATION: 97 % | DIASTOLIC BLOOD PRESSURE: 86 MMHG

## 2021-07-14 PROCEDURE — 99238 HOSP IP/OBS DSCHRG MGMT 30/<: CPT | Mod: GC | Performed by: PSYCHIATRY & NEUROLOGY

## 2021-07-14 PROCEDURE — 250N000013 HC RX MED GY IP 250 OP 250 PS 637

## 2021-07-14 RX ORDER — LURASIDONE HYDROCHLORIDE 60 MG/1
60 TABLET, FILM COATED ORAL DAILY
Qty: 30 TABLET | Refills: 0 | Status: SHIPPED | OUTPATIENT
Start: 2021-07-14 | End: 2021-07-14

## 2021-07-14 RX ORDER — LURASIDONE HYDROCHLORIDE 60 MG/1
60 TABLET, FILM COATED ORAL DAILY
Qty: 30 TABLET | Refills: 0 | Status: SHIPPED | OUTPATIENT
Start: 2021-07-14

## 2021-07-14 RX ORDER — LURASIDONE HYDROCHLORIDE 20 MG/1
60 TABLET, FILM COATED ORAL AT BEDTIME
Status: DISCONTINUED | OUTPATIENT
Start: 2021-07-14 | End: 2021-07-14

## 2021-07-14 RX ORDER — LURASIDONE HYDROCHLORIDE 20 MG/1
60 TABLET, FILM COATED ORAL AT BEDTIME
Status: DISCONTINUED | OUTPATIENT
Start: 2021-07-15 | End: 2021-07-14

## 2021-07-14 RX ADMIN — LURASIDONE HYDROCHLORIDE 60 MG: 20 TABLET, FILM COATED ORAL at 15:34

## 2021-07-14 ASSESSMENT — ACTIVITIES OF DAILY LIVING (ADL)
ORAL_HYGIENE: INDEPENDENT
DRESS: INDEPENDENT
HYGIENE/GROOMING: INDEPENDENT
LAUNDRY: WITH SUPERVISION

## 2021-07-14 NOTE — DISCHARGE INSTRUCTIONS
Behavioral Discharge Planning and Instructions    Summary: You were admitted on 6/28/2021 due to Psychotic Symptomology. You were treated by Dr. Miller and discharged on 7/14/21 from [facility] to home. You have been scheduled with a new medication prescriber per your request. Please make sure you make your appointment or cancel with a 24 hour notice.    Main Diagnosis:   Schizophrenia     Health Care Follow-up:   Medication Management - [facility]  Appointment Date/Time: Thursday, July 29 @  1:15pm In person     Prescriber: Lauren Fairbanks PA-C    Address: [address] [place] [address]    Phone Number: [phone] Fax: [phone]    Attend all scheduled appointments with your outpatient providers. Call at least 24 hours in advance if you need to reschedule an appointment to ensure continued access to your outpatient providers.     Major Treatments, Procedures and Findings:  You were provided with: a psychiatric assessment, assessed for medical stability, medication evaluation and/or management, group therapy, milieu management.    Symptoms to Report: Feeling more aggressive, increased confusion, losing more sleep, mood getting worse, or thoughts of suicide.    Early warning signs can include: Increased depression or anxiety sleep disturbances increased thoughts or behaviors of suicide or self-harm  increased unusual thinking, such as paranoia or hearing voices.    Safety and Wellness: Take all medicines as directed. Make no changes unless your doctor suggests them. Follow treatment recommendations. Refrain from alcohol and non-prescribed drugs.  Ask your support system to help you reduce your access to items that could harm yourself or others. If there is a concern for safety, call 911.    Resources:   Crisis Intervention: 728.887.6518 or 584-773-2241 (TTY: 218.842.6654).  Call anytime for help.  [employer] [place] [employer] Mental Illness (www.[place].naye.org): 204.691.1421 or 006-228-0222.  [place]  "[facility] Crisis Response 037-363-3728  Text 4 Life: txt \"LIFE\" to 87223 for immediate support and crisis intervention  Crisis text line: Text \"[place]\" to 652171. Free, confidential, 24/7.    General Medication Instructions:     See your medication sheet(s) for instructions.     Take all medicines as directed.  Make no changes unless your doctor suggests them.     Go to all your doctor visits.    Be sure to have all your required lab tests. This way, your medicines can be refilled on time.    Do not use any drugs not prescribed by your doctor.    Avoid alcohol.    Advance Directives:   Scanned document on file with [employer]? No scanned doc  Is document scanned? No. Copy Requested.  Honoring Choices Your Rights Handout: Informed and given  Was more information offered? Pt declined    The Treatment team has appreciated the opportunity to work with you. If you have any questions or concerns about your recent admission, you can contact the unit which can receive your call 24 hours a day, 7 days a week. They will be able to get in touch with a Provider if needed. The unit number is [phone] .       "

## 2021-07-14 NOTE — PLAN OF CARE
Assessment/Intervention/Current Symtoms and Care Coordination    Attended team meeting and reviewed chart notes.    Attended family meeting.    Pt will discharge home today with his parents.        Discharge Plan or Goal  Discharge to home      Barriers to Discharge   none      Referral Status  None made      Legal Status  voluntary

## 2021-07-14 NOTE — PLAN OF CARE
Pt appeared to sleep 6.25 hours. No PRNs given or requested. No medical or behavioral events this shift.      Problem: Sleep Disturbance  Goal: Adequate Sleep/Rest  Outcome: No Change

## 2021-07-14 NOTE — PROGRESS NOTES
"  .  .    ----------------------------------------------------------------------------------------------------------  [facility], Surveyor   Psychiatric Progress Note  Hospital Day #16     Interim History:   The patient's care was discussed with the treatment team and chart notes were reviewed.    Sleep 6.25 hours (07/14/21 0553)  Scheduled Medications: compliant with Latuda 60mg.   PRN medications: No PRN medications requested/given.    Staff Report:     Judah again spending day in lounge-most of time sitting in chair watching television-less guarded-does smile in 1:1-continues only brief responses, but more spontaneous-Today responds, \"I guess so.\" to question whether he would discharge home to mom-Received call from mom this AM who expressed concern regarding statement Judah made to friend Sunday evening, \"If you're not willing to help me then you're siding with my parents! I'll find someone to get me out of here and you'll never see me again! I'll disappear off the face of the earth!\"-mom expressing fear Judah will drive off in his car and will be unable to care for himself           Patient Interview:   Interview on 7/14/21 at 1140: Judah says that he is \"good\" and that he's still feeling \"kind of tired\". He says that he's been sleeping \"ok\" and that his appetite is \"ok\". He says that his priority for the meeting later today is to discuss any medications that he thinks he does not need anymore. He denies SI/HI. He says that he can talk with his friends if he has safety concerns. He is interested in being assigned a .     Family Meeting 7/14/2021 with parents only: Parents asked about Judah's diagnoses and the team discussed his diagnosis and symptoms. Also discussed treatment plan with Latuda and why it was chosen. In discussing AH, the team discussed that continued Latuda with a higher dose should help. His father highlighted that the patient can respond to " "being challenged with anger, and talked about how judah \"avoids him like at home\" because he asks \"the hard questions\" to him. He states that Judah responds \"most articulate\" when \"he is angered\". Mom said he started withdrawing in late high school when there youngest son was born with cerebral palsy. Discussed potential discharge plan to home with family and family was accepting. Also discussed future prospects of checking into a group home if he continues to worsen. Mother asked about potential therapy. Gave information about .   Family meeting 7/14/2021 with parents and Judah: Judah responded \"yeah\" when asked if he wanted to go home. In talking about difficulties at home, Judah \"feels pressured to be social\". Judah agreed if he didn't want to be social, he would say no twice. In addressing AH, the team brought up that parents think they are AH rather than real voices and judah said \"I dont agree\". He says he does believe that he can reach out to his parents and that written communication is the best way to communicate if hes feeling unsafe. Furthermore, the team discussed the necessity for Judah to communicate with his family when he is gone for long periods of time. Judah agreed to \"check\" with his family when he is having AH. Agreeing with plan, decision to discharge home with parents was made.     -------------   The risks, benefits, alternatives and side effects of any medication changes have been discussed and are understood by the patient and other caregivers.        Review of systems:     ROS was negative unless noted above.          Allergies:     Allergies   Allergen Reactions     Augmentin Nausea     Clindamycin Nausea and Vomiting            Psychiatric Examination:   /86 (BP Location: Left arm)   Pulse 88   Temp 98.1  F (36.7  C) (Oral)   Resp 16   Ht 1.676 m (5' 6\")   Wt 99.9 kg (220 lb 3.2 oz)   SpO2 97%   BMI 35.54 kg/m    Weight is 220 lbs 3.2 oz  Body " "mass index is 35.54 kg/m .    MENTAL STATUS EXAM    Appearance:  Awake, dressed in hospital scrubs, has long hair, well healed scar on right side of forehead, wandering around the unit.  Muscle Strength and Tone: not assessed   Gait and Station: Normal Gait  Behavior (Psychomotor):  No tremor seen  Eye Contact: Fair   Speech: low rate but improving, low tone but improving  Mood: states that mood is \"good\"  Affect: congruent with mood, improving  Attitude: Engaged, cooperative   Thought Process:   goal-oriented, coherent  Thought Content:  No evidence of suicidal or homicidal ideation; no CAH   Associations:  Limited   Insight:  poor  Judgment:  fair  Oriented to: person, place, time  Attention Span and Concentration: fair  Recent and Remote Memory:  Memory grossly intact  Language: Fluent in English with appropriate syntax          Labs:     Results for orders placed or performed during the hospital encounter of 06/28/21 (from the past 24 hour(s))   Asymptomatic COVID-19 Virus (Coronavirus) by PCR Nasopharyngeal *Canceled*    Specimen: Nasopharyngeal; Swab    Narrative    The following orders were created for panel order Asymptomatic COVID-19 Virus (Coronavirus) by PCR Nasopharyngeal.  Procedure                               Abnormality         Status                     ---------                               -----------         ------                     SARS-COV2 (COVID-19) Vir...[[id number]]                                                   Please view results for these tests on the individual orders.   Asymptomatic COVID-19 Virus (Coronavirus) by PCR Nasopharyngeal    Specimen: Nasopharyngeal; Swab    Narrative    The following orders were created for panel order Asymptomatic COVID-19 Virus (Coronavirus) by PCR Nasopharyngeal.  Procedure                               Abnormality         Status                     ---------                               -----------         ------                     SARS-COV2 " (COVID-19) Vir...[[id number]]  Normal              Final result                 Please view results for these tests on the individual orders.   SARS-COV2 (COVID-19) Virus RT-PCR    Specimen: Nasopharyngeal; Swab   Result Value Ref Range    SARS CoV2 PCR Negative Negative    Narrative    Testing was performed using the blossom  SARS-CoV-2 & Influenza A/B Assay on the blossom  [employer]  System.  This test should be ordered for the detection of SARS-COV-2 in individuals who meet SARS-CoV-2 clinical and/or epidemiological criteria. Test performance is unknown in asymptomatic patients.  This test is for in vitro diagnostic use under the FDA EUA for laboratories certified under CLIA to perform moderate and/or high complexity testing. This test has not been FDA cleared or approved.  A negative test does not rule out the presence of PCR inhibitors in the specimen or target RNA in concentration below the limit of detection for the assay. The possibility of a false negative should be considered if the patient's recent exposure or clinical presentation suggests COVID-19.  [facility] are certified under the Clinical Laboratory Improvement Amendments of 1988 (CLIA-88) as qualified to perform moderate and/or high complexity laboratory testing.        Assessment    Principal Diagnosis:   #schizophrenia   -Latuda PO 60 mg QHS  -Family meeting (Parents) scheduled for Wednesday 7/14 at 2 PM      Diagnostic Impression:   Judah Candelaria is a 36 year old male previously diagnosed with major depressive disorder with psychosis who presented to [facility] psychiatry on  06/29/2021  with auditory hallucinations and paranoid delusion in the context of 2 months of medication non-adherence and social withdrawal during this time. He presented to the ED on 6/27/2021 stating that he has been hearing whispering voices for the last two weeks along with feeling paranoid about being stalked and was transferred to [facility]. He had a  similar episode a 2 years ago where he was hospitalized for 10 days, stabilized on aripiprazole and given a diagnosis of major depressive disorder with psychosis. He is currently prescribed Aripiprazole for psychosis and trazodone for poor sleep, however he discontinued both medications 3 months ago in accordance with recommendations from his medication . Given these findings along with the recent auditory hallucinations and paranoid delusions, the patient's presentation is most consistent with schizophrenia. Differential includes previously diagnosed MDD with psychotic features (although does not meet criteria for MDD currently), schizoaffective disorder, substance induced psychosis.                 Psychiatric Hospital course:   Judah Candelaria was admitted to [facility] as a voluntary patient. Upon admission to the ED, patient was given 5 mg of Olanzapine and on 6/28 was given hydroxyzine for anxiety and Trazodone for poor sleep. Patient appeared to sleep 7 hours last night. On 6/29, he was placed on Lurasidone  20 mg/day for psychosis due to unwanted weight gain from previously prescribed Aripiprazole. He required he required PRN Olanzapine 10mg po on 06/28t, Trazodone 100mg.  On 6/30, the patients mood was more depressed and he was more guarded. Given this, Lurasidone was increased to 40 mg/day. Patient disagreed with plan of increasing Lurasidone to 60mg PO on 7/01. Patient declined to be interviewed on 7/2. On 7/6, the patient agreed to increase the dose of Lurasidone to 60 mg PO for improvement of mood symptoms. On 7/7, he refused Lurasidone and is requesting to be discharged. Pt took Lurasidone on 7/8, however on 7/9 he refused to meet with the treatment team and coordinate plan to discharge. Patient was informed about his rights and voluntary status. 7/9 patient continued to be guarded although mentioned mood improved over the weekend. 7/14 family meeting was held in the inpatient unit,  [facility]    Discontinued Medications (& Rationale):  None    Medical course   Patient was medically cleared prior to admission.    Data:   Negative Urinary tox 6/28    Consults:   none    Plan     Today's Changes:  -Continue Lurasidone 60 mg/day PO  -Family meeting scheduled for Wednesday 7/14 to discuss discharge  -Discharge home with parents       Psychotropic Medications:  Scheduled Psych Medications:  - Lurasidone 60 mg/day PO    PRN Psych Medications  See discharge summary for more details.    Medical diagnoses to be addressed this admission:    #: schizophrenia     Patient will be treated in therapeutic milieu with appropriate individual and group therapies as described.      Legal Status:   Orders Placed This Encounter      Voluntary      Safety Assessment:   Behavioral Orders   Procedures     Code 1 - Restrict to Unit     Routine Programming     As clinically indicated     Status 15     Every 15 minutes.       Disposition:   Plan to discharge following family meeting.    The patient was seen and the plan was discussed with the attending physician.     This patient was seen and discussed with my attending physician.    Vincenzo Ness  MS3    Quintin Rojas MD  Psychiatry PGY-1     Resident Attestation:   I was present with the medical student who participated in the service and in the documentation of the note. I have verified the history and personally performed the exam and medical decision making. I agree with the assessment and plan of care as documented in the note.    Attestation:  This patient has been seen and evaluated by me, Bakari Miller MD.  I have discussed this patient with the house staff team including the resident and medical student and I agree with the findings and plan in this note.    I have reviewed today's vital signs, medications, labs and imaging. Bakari Miller MD , PhD.

## 2021-07-14 NOTE — DISCHARGE SUMMARY
Psychiatric Discharge Summary    Hospital Day #16    Judah Candelaria MRN# [id number]   Age: 36 year old YOB: 1985     Date of Admission:  6/28/2021  Date of Discharge:  7/14/2021  Admitting Physician:  Bakari Miller MD  Discharge Physician:  Bakari Miller MD         Event Leading to Hospitalization:   Judah Candelaria is a 36 year old  White male previously diagnosed with major depressive disorder with psychosis who presented to the ED on 6/27  with auditory hallucinations and paranoid delusions as well as blunted affect, speech delay, avolition in the context of 2-3 months of medication nonadherence. Upon interview on 6/29, the patient was noted to have a flat affect, speech delay, and poor concentration. Given these findings along with the recent auditory hallucinations and paranoid delusions, the patient's presentation is most consistent with schizophrenia. Patient was medially cleared and admitted to [facility] station [facility].       See Admission note by Bakari Miller MD on 6/28  for additional details.          Diagnoses:   #Primary Psychiatric Diagnosis  Schizophrenia    #Secondary Psychiatric Diagnosis  MDD (per chart review)    Diagnostic Impression:    Judah Candelaria is a 36 year old  White male previously diagnosed with major depressive disorder with psychosis who presented to the ED on 6/27  with auditory hallucinations and paranoid delusions as well as blunted affect, speech delay, avolition in the context of 2-3 months of medication nonadherence. Upon interview on 6/29, the patient was noted to have a flat affect, speech delay, and poor concentration. Given these findings along with the recent auditory hallucinations and paranoid delusions, the patient's presentation is most consistent with schizophrenia. Differential includes previously diagnosed MDD with psychotic features (although does not meet criteria for MDD currently), schizoaffective disorder, substance induced psychosis.            Consults:   None         Hospital Course:   Psychiatric Course:  Judah Candelaria was admitted to [facility] as a voluntary patient. Upon admission to the ED, patient was given 5 mg of Olanzapine and on 6/28 was given hydroxyzine for anxiety and Trazodone for poor sleep. Patient appeared to sleep 7 hours last night. On 6/29, he was placed on Lurasidone  20 mg/day for psychosis due to unwanted weight gain from previously prescribed Aripiprazole. He required he required PRN Olanzapine 10mg po on 06/28t, Trazodone 100mg.  On 6/30, the patients mood was more depressed and he was more guarded. Given this, Lurasidone was increased to 40 mg/day. Patient disagreed with plan of increasing Lurasidone to 60mg PO on 7/01. Patient declined to be interviewed on 7/2. On 7/6, the patient agreed to increase the dose of Lurasidone to 60 mg PO for improvement of mood symptoms. On 7/7, he refused Lurasidone and is requesting to be discharged. Pt took Lurasidone on 7/8, however on 7/9 he refused to meet with the treatment team and coordinate plan to discharge. Patient was informed about his rights and voluntary status. 7/9 patient continued to be guarded although mentioned mood improved over the weekend. 7/14 family meeting was held in the inpatient unit, station 22. Safe discharge plan was solidified and agreed by patient and parents. Patient was discharged home with parents after meeting. See progress note on 7/14 for more details.    Risk Assessment:      Today Judah Candelaria denies SI, SIB and HI. No overt evidence of psychosis or dav observed. Patient grossly appears to be cognitively intact. Insight and judgement have improved since admission.  Patient is aware of consequences of not being compliant with medications and not following-up with outpatient psychaitrist. Patient expresses understanding of his mental illness and willingness to get better. Patient has/has not exhibited aggressive behaviors since prior to this  admission. Throughout patient's stay they were not too interactive with staff or other patients but was always visible in the milieu watching Tv, attending to groups .  He has notable risk factors for self-harm, including single status, anxiety and psychosis. However, risk is mitigated by commitment to family, sobriety, absence of past attempts, ability to volunteer a safety plan and history of seeking help when needed. Patient does not have immediate access to firearms, parents keep firearms in a safe, locked. Therefore, based on all available evidence including the factors cited above, he does not appear to be at imminent risk for self-harm, does not meet criteria for a 72-hr hold, and therefore remains appropriate for ongoing outpatient level of care. Patient requested appointment with new outpatient psychiatrist which was scheduled  and  Follow-up was strongly recommended. Additional steps taken to minimize risk include: medication optimization, close psychiatric follow up and provision of crisis resources, including community-based CTC contacts.     Judah was released to home. During this admission, he did participate in groups and was visible in the milieu, and his symptoms improved. At the time of discharge he was determined to not be a danger to himself or others.     This document serves as a transfer of care to [facility] outpatient providers.         Discharge Medications:     Current Discharge Medication List      START taking these medications    Details   lurasidone (LATUDA) 60 MG TABS tablet Take 1 tablet (60 mg) by mouth daily  Qty: 30 tablet, Refills: 0    Associated Diagnoses: Auditory hallucination         CONTINUE these medications which have NOT CHANGED    Details   ALLEGRA-D ALLERGY & CONGESTION 180-240 MG 24 hr tablet TAKE ONE TABLET BY MOUTH EVERY DAY  Qty: 90 tablet, Refills: 1    Associated Diagnoses: Chronic rhinitis      traZODone (DESYREL) 150 MG tablet Take 150 mg by mouth  "At Bedtime         STOP taking these medications       ARIPiprazole (ABILIFY) 2 MG tablet Comments:   Reason for Stopping:                    Psychiatric Examination:   Appearance:  no apparent distress, normal posture, normal gait, well-developed, well-nourished, appears stated age, good hygiene and appropriately dressed  Attitude:  cooperative, engaged and friendly  Psychomotor:  normal and no evidence of tics, dystonia, or tardive dyskinesia  Eye Contact: appropriate   Speech:  fluent English, normal tone, decreased rate and normal prosody  Mood: \"Ok\"  Affect:  congruent and appropriate  Thought Content: Denies SI, HI, CAH.   Thought Process: linear, coherent and goal directed  Sensorium: awake  Cognition: memory grossly intact  Impulse control: good  Insight: fair  Judgment: fair         Discharge Plan:   [facility]/Time:  @  1:15pm In person     Prescriber: Lauren Fairbanks PA-C  Address: [address] [place] [address]    Phone Number: [phone] Fax: [phone]    Psychotherapy Appointments: Offered, patient not interested at this time.        Pt seen and discussed with my attending physician, Bakari Miller MD.   Quintin Rojas MD  PGY-1 Psychiatry Resident    Attestation:   The patient has been seen and evaluated by me,  Bakari Miller MD. I have examined the patient today and reviewed the discharge plan with the resident and medical student. I agree with the final assessment and plan, as noted in the discharge summary. I have reviewed today's vital signs, medications, labs and imaging. I also met with the patient and family for a conference before discharge which included 65 minutes of safety and discharge planning.    Total time discharge plannin minutes  Bakari Miller MD ,Ph.D.          Appendix A: All Labs This Admission:     Results for orders placed or performed during the hospital encounter of " 06/28/21   Asymptomatic SARS-CoV-2 COVID-19 Virus (Coronavirus) by PCR     Status: None    Specimen: Nasopharyngeal   Result Value Ref Range    SARS-CoV-2 Virus Specimen Source Nasopharyngeal     SARS-CoV-2 PCR Result NEGATIVE     SARS-CoV-2 PCR Comment       Testing was performed using the Xpert Xpress SARS-CoV-2 Assay on the Cepheid Gene-Xpert   Instrument Systems. Additional information about this Emergency Use Authorization (EUA)   assay can be found via the Lab Guide.     SARS-COV2 (COVID-19) Virus RT-PCR     Status: Normal    Specimen: Nasopharyngeal; Swab   Result Value Ref Range    SARS CoV2 PCR Negative Negative    Narrative    Testing was performed using the blossom  SARS-CoV-2 & Influenza A/B Assay on the blossom  Margarita  System.  This test should be ordered for the detection of SARS-COV-2 in individuals who meet SARS-CoV-2 clinical and/or epidemiological criteria. Test performance is unknown in asymptomatic patients.  This test is for in vitro diagnostic use under the FDA EUA for laboratories certified under CLIA to perform moderate and/or high complexity testing. This test has not been FDA cleared or approved.  A negative test does not rule out the presence of PCR inhibitors in the specimen or target RNA in concentration below the limit of detection for the assay. The possibility of a false negative should be considered if the patient's recent exposure or clinical presentation suggests COVID-19.  [facility] are certified under the Clinical Laboratory Improvement Amendments of 1988 (CLIA-88) as qualified to perform moderate and/or high complexity laboratory testing.   Asymptomatic COVID-19 Virus (Coronavirus) by PCR Nasopharyngeal *Canceled*     Status: None ()    Specimen: Nasopharyngeal; Swab    Narrative    The following orders were created for panel order Asymptomatic COVID-19 Virus (Coronavirus) by PCR Nasopharyngeal.  Procedure                               Abnormality         Status                      ---------                               -----------         ------                       Please view results for these tests on the individual orders.   Asymptomatic COVID-19 Virus (Coronavirus) by PCR Nasopharyngeal *Canceled*     Status: None ()    Specimen: Nasopharyngeal; Swab    Narrative    The following orders were created for panel order Asymptomatic COVID-19 Virus (Coronavirus) by PCR Nasopharyngeal.  Procedure                               Abnormality         Status                     ---------                               -----------         ------                     SARS-COV2 (COVID-19) Vir...[[id number]]                                                   Please view results for these tests on the individual orders.   Asymptomatic COVID-19 Virus (Coronavirus) by PCR Nasopharyngeal     Status: Normal    Specimen: Nasopharyngeal; Swab    Narrative    The following orders were created for panel order Asymptomatic COVID-19 Virus (Coronavirus) by PCR Nasopharyngeal.  Procedure                               Abnormality         Status                     ---------                               -----------         ------                     SARS-COV2 (COVID-19) Vir...[[id number]]  Normal              Final result                 Please view results for these tests on the individual orders.

## 2021-07-14 NOTE — PLAN OF CARE
Problem: Behavioral Health Plan of Care  Goal: Plan of Care Review  Outcome: Adequate for Discharge  Flowsheets (Taken 7/14/2021 1007)  Plan of Care Reviewed With: patient  Progress: improving  Patient Agreement with Plan of Care: agrees     Problem: Mood Impairment (Psychotic Signs/Symptoms)  Goal: Improved Mood Symptoms (Psychotic Signs/Symptoms)  Outcome: Adequate for Discharge  Flowsheets (Taken 7/14/2021 1007)  Mutually Determined Action Steps (Improved Mood Symptoms): acknowledges progress     Problem: Sensory Perception Impairment (Psychotic Signs/Symptoms)  Goal: Decreased Sensory Symptoms (Psychotic Signs/Symptoms)  Outcome: Adequate for Discharge  Flowsheets (Taken 7/14/2021 1007)  Mutually Determined Action Steps (Decreased Sensory Symptoms): adheres to medication regimen     Judah denies hallucinations, denies depressed mood, denies SI-hoping he will be discharged post afternoon family meeting-1539 Judah discharged 1538 care of parents to return home-reviewed medication schedule and outpt tx plans and verbalized understanding and agreement-to obtain meds from [facility] outpt pharmacy tomorrow-Received today's dose of Latuda prior to discharge per Dr Miller's order-has printed copy of AVS in his possession

## 2021-08-14 ENCOUNTER — LAB (OUTPATIENT)
Dept: LAB | Facility: CLINIC | Age: 36
End: 2021-08-14
Payer: COMMERCIAL

## 2021-08-14 DIAGNOSIS — F29 PSYCHOSES (H): ICD-10-CM

## 2021-08-14 DIAGNOSIS — Z79.899 NEED FOR PROPHYLACTIC CHEMOTHERAPY: Primary | ICD-10-CM

## 2021-08-14 DIAGNOSIS — Z13.220 ENCOUNTER FOR SCREENING FOR LIPOID DISORDERS: ICD-10-CM

## 2021-08-14 LAB
CHOLEST SERPL-MCNC: 162 MG/DL
FASTING STATUS PATIENT QL REPORTED: YES
FASTING STATUS PATIENT QL REPORTED: YES
GLUCOSE BLD-MCNC: 111 MG/DL (ref 70–99)
HBA1C MFR BLD: 5.9 % (ref 0–5.6)
HDLC SERPL-MCNC: 38 MG/DL
LDLC SERPL CALC-MCNC: 114 MG/DL
NONHDLC SERPL-MCNC: 124 MG/DL
TRIGL SERPL-MCNC: 51 MG/DL

## 2021-08-14 PROCEDURE — 80061 LIPID PANEL: CPT

## 2021-08-14 PROCEDURE — 83036 HEMOGLOBIN GLYCOSYLATED A1C: CPT

## 2021-08-14 PROCEDURE — 82947 ASSAY GLUCOSE BLOOD QUANT: CPT

## 2021-08-14 PROCEDURE — 36415 COLL VENOUS BLD VENIPUNCTURE: CPT

## 2021-08-20 ENCOUNTER — TELEPHONE (OUTPATIENT)
Dept: PEDIATRICS | Facility: CLINIC | Age: 36
End: 2021-08-20

## 2021-08-20 NOTE — TELEPHONE ENCOUNTER
Reason for Call:  Other  Lab results     Detailed comments:  Patient needs result form 8/14/2021 sent to his home address ASAP before 8/26/2021 (I verified demographics)    Phone Number Patient can be reached at: Cell number on file:    Telephone Information:   Mobile [phone] or [phone]       Best Time: Anytime     Can we leave a detailed message on this number? YES    Call taken on 8/20/2021 at 8:26 AM by Pam Lala

## 2021-08-20 NOTE — TELEPHONE ENCOUNTER
Printed lab results from 08/14/2021 and placed in out going mail.    Gabbi Patel MA 9:48 AM 8/20/2021

## 2021-08-24 NOTE — TELEPHONE ENCOUNTER
"Patient's mother called stating they have not received lab results.  Confirmed with mother that results were mailed 8/20/21.  Verified home address.  Mother states that there is an \"E\" at the end of [address].  This writer updated address and re-mailed results.  Additionally mother is wondering if patient can  a copy of lab results at [place] location, as its a closer drive.  Confirmed with TC at [place] that patient can walk in and  a copy.  Mother confirmed.      Lara Glover  Lead       "

## 2022-01-05 ENCOUNTER — MEDICAL CORRESPONDENCE (OUTPATIENT)
Dept: HEALTH INFORMATION MANAGEMENT | Facility: CLINIC | Age: 37
End: 2022-01-05
Payer: COMMERCIAL

## 2022-01-11 DIAGNOSIS — J31.0 CHRONIC RHINITIS: ICD-10-CM

## 2022-01-11 RX ORDER — FEXOFENADINE HYDROCHLORIDE AND PSEUDOEPHEDRINE HYDROCHLORIDE 180; 240 MG/1; MG/1
TABLET, FILM COATED, EXTENDED RELEASE ORAL
Qty: 90 TABLET | Refills: 1 | Status: SHIPPED | OUTPATIENT
Start: 2022-01-11 | End: 2022-07-22

## 2022-01-23 ENCOUNTER — LAB (OUTPATIENT)
Dept: LAB | Facility: CLINIC | Age: 37
End: 2022-01-23
Payer: COMMERCIAL

## 2022-01-23 DIAGNOSIS — Z79.899 HIGH RISK MEDICATIONS (NOT ANTICOAGULANTS) LONG-TERM USE: ICD-10-CM

## 2022-01-23 DIAGNOSIS — F29 PSYCHOSIS (H): Primary | ICD-10-CM

## 2022-01-23 DIAGNOSIS — F51.05 INSOMNIA RELATED TO ANOTHER MENTAL DISORDER: ICD-10-CM

## 2022-01-23 LAB
CHOLEST SERPL-MCNC: 163 MG/DL
FASTING STATUS PATIENT QL REPORTED: YES
FASTING STATUS PATIENT QL REPORTED: YES
GLUCOSE BLD-MCNC: 115 MG/DL (ref 70–99)
HBA1C MFR BLD: 6 % (ref 0–5.6)
HDLC SERPL-MCNC: 36 MG/DL
LDLC SERPL CALC-MCNC: 114 MG/DL
NONHDLC SERPL-MCNC: 127 MG/DL
TRIGL SERPL-MCNC: 65 MG/DL

## 2022-01-23 PROCEDURE — 36415 COLL VENOUS BLD VENIPUNCTURE: CPT

## 2022-01-23 PROCEDURE — 82947 ASSAY GLUCOSE BLOOD QUANT: CPT

## 2022-01-23 PROCEDURE — 80061 LIPID PANEL: CPT

## 2022-01-23 PROCEDURE — 83036 HEMOGLOBIN GLYCOSYLATED A1C: CPT

## 2022-07-22 DIAGNOSIS — J31.0 CHRONIC RHINITIS: ICD-10-CM

## 2022-07-22 RX ORDER — FEXOFENADINE HCL AND PSEUDOEPHEDRINE HCL 180; 240 MG/1; MG/1
1 TABLET, EXTENDED RELEASE ORAL DAILY
Qty: 90 TABLET | Refills: 0 | Status: SHIPPED | OUTPATIENT
Start: 2022-07-22

## 2022-07-26 DIAGNOSIS — Q25.1 CONGENITAL ATRESIA AND STENOSIS OF AORTA: ICD-10-CM

## 2022-07-26 DIAGNOSIS — Q25.29 CONGENITAL ATRESIA AND STENOSIS OF AORTA: ICD-10-CM

## 2022-07-26 DIAGNOSIS — Q23.81 BICUSPID AORTIC VALVE: Primary | ICD-10-CM

## 2023-03-01 DIAGNOSIS — Z13.220 SCREENING FOR LIPOID DISORDERS: ICD-10-CM

## 2023-03-01 DIAGNOSIS — Z79.899 ENCOUNTER FOR LONG-TERM (CURRENT) USE OF MEDICATIONS: Primary | ICD-10-CM
